# Patient Record
Sex: FEMALE | Race: WHITE | NOT HISPANIC OR LATINO | Employment: OTHER | URBAN - METROPOLITAN AREA
[De-identification: names, ages, dates, MRNs, and addresses within clinical notes are randomized per-mention and may not be internally consistent; named-entity substitution may affect disease eponyms.]

---

## 2017-01-05 ENCOUNTER — BEH TREATMENT PLAN (OUTPATIENT)
Dept: BEHAVIORAL HEALTH | Facility: CLINIC | Age: 62
End: 2017-01-05
Attending: PSYCHIATRY & NEUROLOGY

## 2017-01-05 NOTE — PROGRESS NOTES
Acknowledgement of Current Treatment Plan       I have reviewed my treatment plan with my therapist / counselor on 5.5.17. I agree with the plan as it is written in the electronic health record.    Name Signature   Rosy Jean    Name of Therapist / Counselor    Sanya Draper MA [facility]

## 2017-01-05 NOTE — PROGRESS NOTES
"Initial Individual Treatment Plan     Patient: Rosy Jean   MRN: [id number]  : 1955  Age: 61 year old  Sex: female    Diagnostic Assessment Date / Date of Initial Individual Treatment Plan: 17      Immediate Health Concerns:  No     Immediate Safety Concerns:  No    Identify the issues to be addressed in treatment:  Symptom Management, Community Resources/Discharge Planning, Abstinence/Relapse Prevention and Develop Socialization / Interpersonal Relationship Skills    Client Initial Individualized Goals for Treatment: \" Improved mental health\".    Initial Treatment suggestions for the client during the time between Diagnostic Assessment and completion of the Individualized Treatment Plan:  Follow Safety Plan   Ask for more information, support and/or assistance as needed.  Follow up with providers/community supports as needed:   Report increases or changes in symptoms to staff.  Report any personal safety concerns to staff.   Take medications as prescribed.  Report medication changes and/or side effects to staff.  Attend and participate in groups as scheduled or notify staff if unable to do so.  Report any use of substances to staff as this may impact your symptoms and/or personal safety.  Notify staff if you have any other issues that need to be addressed. This may include any current abuse / neglect / exploitation or other vulnerability.  Follow recommendations of your treatment team and discuss concerns if not in agreement.     Treatment Team Responsible: 55+ Outpatient Program (55+)     Therapeutic Interventions/Treatment Strategies may include:  Support, Redirection, Feedback, Limit/Boundaries, Safety Assessments, Structured Activity, Problem Solving, Clarification, Education, Motivational Enhancement and Relapse Prevention as needed.    Marilyn Kinsey, LICSW              "

## 2017-01-10 ENCOUNTER — HOSPITAL ENCOUNTER (OUTPATIENT)
Dept: BEHAVIORAL HEALTH | Facility: CLINIC | Age: 62
End: 2017-01-10
Attending: PSYCHIATRY & NEUROLOGY | Admitting: PSYCHIATRY & NEUROLOGY
Payer: COMMERCIAL

## 2017-01-10 PROBLEM — F31.9 BIPOLAR AFFECTIVE (H): Status: ACTIVE | Noted: 2017-01-10

## 2017-01-10 PROCEDURE — H2012 BEHAV HLTH DAY TREAT, PER HR: HCPCS

## 2017-01-10 NOTE — PROGRESS NOTES
"Group Therapy Progress Notes     Area of Treatment Focus:  Symptom Management, Community Resources/Discharge Planning and Develop Socialization / Interpersonal Relationship Skills    Therapeutic Interventions/Treatment Strategies:  Support, Redirection, Structured Activity and Education    Response to Treatment Strategies:  Accepted Feedback, Gave Feedback, Listened, Focused on Goals, Attentive, Accepted Support and Alert    Name of Group:  Mental health management    Progress Note  Group members identified stress and related body tension as result of winter weather this am, most often identifying tension in shoulders and neck.  Brief discussion around pausing to listen to body cues before practising listening to body. Breathing and stretches focused on attending to areas of tension before explore image of feeling \"uplifted\" in body and comparison to feeling \"compressed.\"  Rosy appeared guarded throughout group, although she shared she has done some work with breathing as part of yoga.          Is this a Weekly Review of the Progress on the Treatment Plan?  No     "

## 2017-01-10 NOTE — PROGRESS NOTES
"Group Therapy Progress Notes      Area of Treatment Focus:  Symptom Management, Community Resources/Discharge Planning, Abstinence/Relapse Prevention, Develop / Improve Independent Living Skills and Develop Socialization / Interpersonal Relationship Skills    Therapeutic Interventions/Treatment Strategies:  Support, Feedback, Structured Activity, Problem Solving, Clarification, Education, Motivational Enhancement Therapy and Cognitive Behavioral Therapy    Response to Treatment Strategies:  Accepted Feedback, Gave Feedback, Listened, Focused on Goals, Attentive, Alert and Demonstrates Behavior Change    Name of Group:  Psychotherapy Group      Progress Note  Hour 1: Rosy arrived. She shared some of circumstances that led to day treatment but continues to exhibit impaired insight. She made several statements that \" she felt she was being punished by her  and that he put her in the hospital\". She appears to continue to struggle with acceptance of her diagnosis and the part her illness has played in the conflicts in her marriage and with her children. She did express anger towards her . She was polite and cooperative but superficial. We did discuss as a group manic symptoms and a peer shared with her from her own experience. At this time she continues to express ambivalence about how day treatment could be helpful reports she would rather be home, organizing her home or exercising.  Hour 2: Reviewed signs and symptoms of bipolar disorder -both manic phase and depressive phase.           Is this a Weekly Review of the Progress on the Treatment Plan?  No                            "

## 2017-01-11 ENCOUNTER — HOSPITAL ENCOUNTER (OUTPATIENT)
Dept: BEHAVIORAL HEALTH | Facility: CLINIC | Age: 62
End: 2017-01-11
Attending: PSYCHIATRY & NEUROLOGY
Payer: COMMERCIAL

## 2017-01-11 ENCOUNTER — TELEPHONE (OUTPATIENT)
Dept: FAMILY MEDICINE | Facility: CLINIC | Age: 62
End: 2017-01-11

## 2017-01-11 PROCEDURE — H2012 BEHAV HLTH DAY TREAT, PER HR: HCPCS

## 2017-01-11 NOTE — PROGRESS NOTES
"Group Therapy Progress Notes     Area of Treatment Focus:  Symptom Management, Personal Safety, Community Resources/Discharge Planning, Abstinence/Relapse Prevention, Develop / Improve Independent Living Skills and Develop Socialization / Interpersonal Relationship Skills    Therapeutic Interventions/Treatment Strategies:  Support, Feedback, Safety Assessments, Problem Solving and Clarification    Response to Treatment Strategies:  Accepted Feedback, Gave Feedback, Listened, Focused on Goals, Attentive and Accepted Support    Name of Group:  Psychotherapy    Progress Note  Session 1: Rosy shared with the group that she was here only to fulfil the requirements of her stay of commitment. She disagrees with their diagnosis and disputes the behaviors that were cited as reasons for her being unsafe. She indicated that she really had no interest in \"sitting here talking about myself\". She did, however, engage with the group and provide feedback and asked appropriate questions.  Session 2: Rosy actively participated in the group discussion of being able to identify the triggers that tend to cause us to revert to old, unhelpful, behaviors            Is this a Weekly Review of the Progress on the Treatment Plan?  Yes.      Are Treatment Plan Goals being addressed?  Yes, continue treatment goals      Are Treatment Plan Strategies to Address Goals Effective?  Yes, continue treatment strategies      Are there any current contracts in place?  Yes. Attendance and safety           "

## 2017-01-11 NOTE — PROGRESS NOTES
Group Therapy Progress Notes     Area of Treatment Focus:  Symptom Management and Develop / Improve Independent Living Skills    Therapeutic Interventions/Treatment Strategies:  Support, Redirection, Feedback, Problem Solving, Clarification, Education and Motivational Enhancement Therapy    Response to Treatment Strategies:  Accepted Feedback, Gave Feedback, Listened, Focused on Goals, Attentive, Accepted Support, Alert and Demonstrates Behavior Change    Name of Group:  Mental Health Management    Progress Note  Using a handout about creating a happy brain, this client made her plan to give herself the following to increase her potential happiness: Rosy plans to read a new good book.    Is this a Weekly Review of the Progress on the Treatment Plan?  Yes.      Are Treatment Plan Goals being addressed?  Yes, continue treatment goals      Are Treatment Plan Strategies to Address Goals Effective?  Yes, continue treatment strategies      Are there any current contracts in place?  No

## 2017-01-13 ENCOUNTER — HOSPITAL ENCOUNTER (OUTPATIENT)
Dept: BEHAVIORAL HEALTH | Facility: CLINIC | Age: 62
End: 2017-01-13
Attending: PSYCHIATRY & NEUROLOGY
Payer: COMMERCIAL

## 2017-01-13 PROCEDURE — H2012 BEHAV HLTH DAY TREAT, PER HR: HCPCS

## 2017-01-13 NOTE — PROGRESS NOTES
Group Therapy Progress Notes     Area of Treatment Focus:  Symptom Management and Develop Socialization / Interpersonal Relationship Skills    Therapeutic Interventions/Treatment Strategies:  Support, Feedback, Structured Activity and Education    Response to Treatment Strategies:  Accepted Feedback, Gave Feedback, Listened and Attentive    Name of Group:  Mental health management    Progress Note  After an initial warmup with breath, group was provided with structure to explore movement preferences (efforts).  Group was encouraged to think of movement preferences as ways of approaching life, transitions and habits.  Discussion aroundA knowing self and expanding repetoire.  Group members noted that movement alone contributed to increased energy and sense of aliveness.   Rosy apologized for her difficulty staying awake and focused, although she acknowledged group was helpful.          Is this a Weekly Review of the Progress on the Treatment Plan?  No

## 2017-01-13 NOTE — PROGRESS NOTES
"Group Therapy Progress Notes     Area of Treatment Focus:  Symptom Management, Personal Safety, Community Resources/Discharge Planning, Abstinence/Relapse Prevention, Develop / Improve Independent Living Skills and Develop Socialization / Interpersonal Relationship Skills    Therapeutic Interventions/Treatment Strategies:  Support, Feedback, Safety Assessments and Problem Solving    Response to Treatment Strategies:  Accepted Feedback, Gave Feedback, Listened, Focused on Goals, Attentive and Accepted Support    Name of Group:  Psychotherapy    Progress Note  Hour 1: Rosy chapa participated in group today. She did offer some feedback and some encouragement to other group members. However, she denies that she has any reason to be here, other than \"I was told I had to\". She minimizes or denies any active symptoms of christiano. She exhibits some pressured speech and flight of ideas. She makes it very clear that she does not therapy and blames her  in particular, for her being here.   Hour 2: Client did not participated in the continuing discussion of how to go about \"checking yourself\", as method to measure the amount of change, if any, in your mood over a day or several days.          Is this a Weekly Review of the Progress on the Treatment Plan?  Yes.      Are Treatment Plan Goals being addressed?  Yes, continue treatment goals      Are Treatment Plan Strategies to Address Goals Effective?  Yes, continue treatment strategies      Are there any current contracts in place?  Yes. safety           "

## 2017-01-13 NOTE — PROGRESS NOTES
Group Therapy Progress Notes      Area of Treatment Focus:  Symptom Management, Community Resources/Discharge Planning, Abstinence/Relapse Prevention, Develop / Improve Independent Living Skills and Develop Socialization / Interpersonal Relationship Skills    Therapeutic Interventions/Treatment Strategies:  Support, Feedback, Structured Activity, Problem Solving, Clarification, Education, Motivational Enhancement Therapy and Cognitive Behavioral Therapy    Response to Treatment Strategies:  Accepted Feedback, Gave Feedback, Listened, Focused on Goals, Accepted Support, Alert and Demonstrates Behavior Change    Name of Group:  Mental health management and wellness    Progress Note  Discussed concept of re-purposing skills used in prior developmental stages ( work, caregiving, etc) to create ways of coping with current stressors. All had examples from their pasts of times they manage difficult transitions or crisis. Brainstormed how they could re-purpose skills to deal with current transition. Rosy shared how she has historically pressured herself -and then misses better problem solving opportunities.      Is this a Weekly Review of the Progress on the Treatment Plan?  Yes.      Are Treatment Plan Goals being addressed?  Yes, continue treatment goals      Are Treatment Plan Strategies to Address Goals Effective?  Yes, continue treatment strategies      Are there any current contracts in place?  Yes. safety No suicidal ideation endorsed.

## 2017-01-17 ENCOUNTER — HOSPITAL ENCOUNTER (OUTPATIENT)
Dept: BEHAVIORAL HEALTH | Facility: CLINIC | Age: 62
End: 2017-01-17
Attending: PSYCHIATRY & NEUROLOGY
Payer: COMMERCIAL

## 2017-01-17 VITALS — HEIGHT: 67 IN | WEIGHT: 209 LBS | BODY MASS INDEX: 32.8 KG/M2

## 2017-01-17 PROCEDURE — H2012 BEHAV HLTH DAY TREAT, PER HR: HCPCS

## 2017-01-17 NOTE — PROGRESS NOTES
Group Therapy Progress Notes     Area of Treatment Focus:  Symptom Management and Develop Socialization / Interpersonal Relationship Skills    Therapeutic Interventions/Treatment Strategies:  Support, Feedback, Structured Activity and Education    Response to Treatment Strategies:  Accepted Feedback, Gave Feedback, Listened, Focused on Goals, Attentive, Accepted Support and Alert    Name of Group:  Mental health management    Progress Note  Group members identified difficulty with identifying own needs.  Time was spent with guided breathing to facilitate an increased awareness of self.  Members were then encouraged to share a movement with group to expand repetoire. Brief exploration of varying emotional states and associated body postures/experience.  Discussion around importance of exploring repetoire of movement and emotion.    Rosy was engaged, although she appeared distracted at times.          Is this a Weekly Review of the Progress on the Treatment Plan?  No

## 2017-01-17 NOTE — PROGRESS NOTES
"Group Therapy Progress Notes     Area of Treatment Focus:  Symptom Management, Personal Safety, Community Resources/Discharge Planning, Abstinence/Relapse Prevention, Develop / Improve Independent Living Skills and Develop Socialization / Interpersonal Relationship Skills    Therapeutic Interventions/Treatment Strategies:  Support, Feedback, Safety Assessments, Structured Activity, Problem Solving and Education    Response to Treatment Strategies:  Accepted Feedback, Gave Feedback, Listened, Focused on Goals, Attentive and Accepted Support    Name of Group:  Psychotherapy, Coping With Depression    Progress Note  Psychotherapy: Rosy reported to the group that she did not know what she was supposed to talk about in group. It was explained that members talk about what brought them to group in the first place and how they are progressing on their goals. She stated that she didn't have anything to talk about since she was told to be here and she ended up in the hospital through a misunderstanding. She continues to minimize the dangerousness of her behavior or acknowledge that she was out of control with her christiano. She is hypervigilant about the use of language and is quite careful to avoid saying or agreeing with anything that might infer that she is mentally ill or had been out of control. She continues to blame the whole situation on her  and is angry with that.  Coping With Depression: Rosy participated in the group discussion of the handout, \"Where I was, where I am, where am I going\". The focus of the handout is to explore how to use the skills and interests we have as a catalyst to develop a goal of where we would like to be. The idea being that a goal to focus on can provide a sense of hopefulness while dealing with depression.          Is this a Weekly Review of the Progress on the Treatment Plan?  No       "

## 2017-01-18 ENCOUNTER — HOSPITAL ENCOUNTER (OUTPATIENT)
Dept: BEHAVIORAL HEALTH | Facility: CLINIC | Age: 62
End: 2017-01-18
Attending: PSYCHIATRY & NEUROLOGY
Payer: COMMERCIAL

## 2017-01-18 PROCEDURE — H2012 BEHAV HLTH DAY TREAT, PER HR: HCPCS

## 2017-01-18 NOTE — PROGRESS NOTES
"Group Therapy Progress Notes     Area of Treatment Focus:  Symptom Management and Abstinence/Relapse Prevention    Therapeutic Interventions/Treatment Strategies:  Support, Redirection, Feedback, Structured Activity, Problem Solving, Clarification, Education and Motivational Enhancement Therapy    Response to Treatment Strategies:  Accepted Feedback, Gave Feedback, Listened, Focused on Goals, Attentive, Accepted Support, Alert and Demonstrates Behavior Change    Name of Group:  Mental Health Management    Progress Note  Using provided handout on \"Cognitive Distortions\", client read in turn, highlighted those more reasonable beliefs to replace the ones that were distorted. Rosy felt that the handout had so many concepts within it, that it was hard to read. She initiated a lot of discussion and already is using some of the reasonable beliefs in her life that she was able to share.     Is this a Weekly Review of the Progress on the Treatment Plan?  Yes.      Are Treatment Plan Goals being addressed?  Yes, continue treatment goals      Are Treatment Plan Strategies to Address Goals Effective?  Yes, continue treatment strategies      Are there any current contracts in place?  No    "

## 2017-01-18 NOTE — PROGRESS NOTES
"Group Therapy Progress Notes     Area of Treatment Focus:  Symptom Management, Personal Safety, Community Resources/Discharge Planning, Abstinence/Relapse Prevention, Develop / Improve Independent Living Skills and Develop Socialization / Interpersonal Relationship Skills    Therapeutic Interventions/Treatment Strategies:  Support, Feedback, Safety Assessments, Problem Solving and Clarification    Response to Treatment Strategies:  Accepted Feedback, Gave Feedback, Listened, Focused on Goals, Attentive and Accepted Support    Name of Group:  Psychotherapy    Progress Note  Hour 1: Rosy reported that she is doing well. Simply complying with the rules of her stay of commitment. She did share that she is still hurt and angry at her family for \"putting me in the hospital over Spike\". She has no insight into her own behavior being a precipitant for this. She is pleasant and offers feedback to others, but that is the extent of her personal involvement.  Hour 2: Client participated in the group review of individual progress in psychotherapy and was able to share and elicit feedback from the other group members.           Is this a Weekly Review of the Progress on the Treatment Plan?  Yes.      Are Treatment Plan Goals being addressed?  Yes, continue treatment goals      Are Treatment Plan Strategies to Address Goals Effective?  Yes, continue treatment strategies      Are there any current contracts in place?  Yes. safety           "

## 2017-01-19 NOTE — PROGRESS NOTES
Group Therapy Progress Notes     Area of Treatment Focus:  Symptom Management    Therapeutic Interventions/Treatment Strategies:  Support, Feedback, Safety Assessments, Structured Activity and Education    Response to Treatment Strategies:  Accepted Feedback, Listened, Attentive, Accepted Support and Alert    Name of Group:  Wellness    Progress Note  Client presented with calm,even mood.Good focus and concentration during a discussion related to sources of stress and coping skills. She appeared interested in the group discussion as she wrote down some notes and asked questions.      Is this a Weekly Review of the Progress on the Treatment Plan?  No

## 2017-01-20 ENCOUNTER — HOSPITAL ENCOUNTER (OUTPATIENT)
Dept: BEHAVIORAL HEALTH | Facility: CLINIC | Age: 62
End: 2017-01-20
Attending: PSYCHIATRY & NEUROLOGY
Payer: COMMERCIAL

## 2017-01-20 PROCEDURE — H2012 BEHAV HLTH DAY TREAT, PER HR: HCPCS

## 2017-01-20 NOTE — PROGRESS NOTES
"Group Therapy Progress Notes       Area of Treatment Focus:  Symptom Management, Community Resources/Discharge Planning, Abstinence/Relapse Prevention, Develop / Improve Independent Living Skills and Develop Socialization / Interpersonal Relationship Skills    Therapeutic Interventions/Treatment Strategies:  Support, Feedback, Limit/Boundaries, Safety Assessments, Structured Activity, Problem Solving, Clarification, Education, Motivational Enhancement Therapy and Cognitive Behavioral Therapy    Response to Treatment Strategies:  Accepted Feedback, Gave Feedback, Listened, Focused on Goals, Attentive, Accepted Support, Alert and Demonstrates Behavior Change    Name of Group:  Mental health management-Coping with Depression.        Progress Note  Reviewed worksheet, \" Dysfunctional Beliefs\". Reviewed as a group common cognitive distortions. Had each member chose one and discuss a more functional way of working with the cognitive distortion.    Rosy identified need for approval as an issue for her.  She is practicing working on how she approaches her  about many of their marital issues, and is working to own her own behavior as well as stand up for herself.    Is this a Weekly Review of the Progress on the Treatment Plan?  Yes.      Are Treatment Plan Goals being addressed?  Yes, continue treatment goals      Are Treatment Plan Strategies to Address Goals Effective?  Yes, continue treatment strategies      Are there any current contracts in place?  Yes. safety - No suicidal ideation endorsed.                                                              "

## 2017-01-20 NOTE — PROGRESS NOTES
"Group Therapy Progress Notes     Area of Treatment Focus:  Symptom Management, Personal Safety, Community Resources/Discharge Planning, Abstinence/Relapse Prevention, Develop / Improve Independent Living Skills and Develop Socialization / Interpersonal Relationship Skills    Therapeutic Interventions/Treatment Strategies:  Support, Feedback, Safety Assessments, Problem Solving and Clarification    Response to Treatment Strategies:  Accepted Feedback, Gave Feedback, Listened, Focused on Goals, Attentive and Accepted Support    Name of Group:  Psychotherapy    Progress Note  Hour 1: Rosy shared with the group some of her plans for possibly moving back to [place], but she is still extremely angry with her  for \"putting me in the hospital\". She was asked if she had any responsibility for her hospitalization and she denied that she did. She indicated that she believes her 's influence as a physician convinced the [facility] team to take her to the hospital and get her put on stay. She continues to lack insight into the seriousness of her behavior and her mental illness.  Hour 2 : The group took time to review the issues that members presented in hour 1 and discussed how many, if not all, of the group members were sharing very similar issues and explored how the progress would appear on a continuum.          Is this a Weekly Review of the Progress on the Treatment Plan?  Yes.      Are Treatment Plan Goals being addressed?  Yes, continue treatment goals      Are Treatment Plan Strategies to Address Goals Effective?  Yes, continue treatment strategies      Are there any current contracts in place?  Yes. safety           "

## 2017-01-20 NOTE — PROGRESS NOTES
Group Therapy Progress Notes     Area of Treatment Focus:  Symptom Management and Develop Socialization / Interpersonal Relationship Skills    Therapeutic Interventions/Treatment Strategies:  Support, Feedback, Structured Activity and Education    Response to Treatment Strategies:  Accepted Feedback, Gave Feedback, Listened, Focused on Goals, Attentive, Accepted Support and Alert    Name of Group:  Mental health management    Progress Note  Group was provided with education comparing/contrasting authenticity and perfectionism.  Focus on authenticity as way of validating self and understanding strengths and weaknesses to better understand needs and cope effectively.  Group members identified traits associated with perfectionism such as rigidity, procrastination and decreased sense  Of self worth. Rosy wrote notes during presentation and shared that she found that information on perfectionism could help articulate her needs with .          Is this a Weekly Review of the Progress on the Treatment Plan?  No

## 2017-01-24 ENCOUNTER — HOSPITAL ENCOUNTER (OUTPATIENT)
Dept: BEHAVIORAL HEALTH | Facility: CLINIC | Age: 62
End: 2017-01-24
Attending: PSYCHIATRY & NEUROLOGY
Payer: COMMERCIAL

## 2017-01-24 PROCEDURE — H2012 BEHAV HLTH DAY TREAT, PER HR: HCPCS
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
## 2017-01-25 NOTE — PROGRESS NOTES
"Group Therapy Progress Notes     Area of Treatment Focus:  Symptom Management and Develop Socialization / Interpersonal Relationship Skills    Therapeutic Interventions/Treatment Strategies:  Support, Feedback, Structured Activity and Education    Response to Treatment Strategies:  Accepted Feedback, Gave Feedback, Listened, Focused on Goals, Attentive, Accepted Support and Alert    Name of Group:  Mental health management    Progress Note  Group began with members sharing their ideas from previous group on personal responsibility and what it means to be an \"adult\" and shared that in many cases being an adult has implied limiting playfulness.  Group members also shared how depression has impacted their sense of feeling like their \"real selves.\"  After a brief warmup group members were encouraged to share a movement with group and create a group dance to experience spontaneity.  Rosy was engaged throughout group.  She shared also that she has been experiencing an increase in shoulder pain and her feeling that she would like to follow up with PT.          Is this a Weekly Review of the Progress on the Treatment Plan?  No     "

## 2017-01-27 ENCOUNTER — HOSPITAL ENCOUNTER (OUTPATIENT)
Dept: BEHAVIORAL HEALTH | Facility: CLINIC | Age: 62
End: 2017-01-27
Attending: PSYCHIATRY & NEUROLOGY
Payer: COMMERCIAL

## 2017-01-27 PROCEDURE — H2012 BEHAV HLTH DAY TREAT, PER HR: HCPCS

## 2017-01-27 NOTE — PROGRESS NOTES
Group Therapy Progress Notes     Area of Treatment Focus:  Symptom Management and Develop Socialization / Interpersonal Relationship Skills    Therapeutic Interventions/Treatment Strategies:  Support, Feedback, Clarification and Education    Response to Treatment Strategies:  Accepted Feedback, Gave Feedback, Listened, Focused on Goals, Attentive, Accepted Support and Alert    Name of Group:  Mental health management    Progress Note  Rosy was attentive during Geraldo Madison talk presentation on shame.  She asked for quotations on shame to be repeated so that she could write them down.            Is this a Weekly Review of the Progress on the Treatment Plan?  No

## 2017-01-31 ENCOUNTER — HOSPITAL ENCOUNTER (OUTPATIENT)
Dept: BEHAVIORAL HEALTH | Facility: CLINIC | Age: 62
End: 2017-01-31
Attending: PSYCHIATRY & NEUROLOGY
Payer: COMMERCIAL

## 2017-01-31 PROCEDURE — H2012 BEHAV HLTH DAY TREAT, PER HR: HCPCS

## 2017-01-31 NOTE — PROGRESS NOTES
Group Therapy Progress Notes     Area of Treatment Focus:  Symptom Management, Personal Safety, Community Resources/Discharge Planning, Abstinence/Relapse Prevention, Develop / Improve Independent Living Skills and Develop Socialization / Interpersonal Relationship Skills    Therapeutic Interventions/Treatment Strategies:  Support, Feedback, Safety Assessments, Structured Activity, Problem Solving and Education    Response to Treatment Strategies:  Accepted Feedback, Gave Feedback, Listened, Focused on Goals, Attentive and Accepted Support    Name of Group:  Psychotherapy, Coping With Depression and Anxiety    Progress Note  Psychotherapy:  Hour 1: Rosy shared with the group that she feels she is doing well. She has made follow up appointments with a psychiatrist, as well as an individual therapists. She reported that continues to try and work out some longstanding issues with her . She shared some plans she has for building a home in [place] and wants to be ready to move by her 62th birthday. Though she emphasized that she does not intend to move at that specific time, she would just like to have that as a goal for herself. She also clarified that she is aware that her goals may sound somewhat grandiose, and having a bipolar illness, she is aware that people may take this plan in the wrong way and think she is manic again. However, she feels that she has thought it through and can present it clearly and articulately to her  and is hoping he would feel that this is a good idea as well. She seems to be more interested in groups and the discussions, though she is still a bit tangential and hyperverbal. She maintains a good sense of humor, but sometimes takes things very literally, but is fine once it is explained to her.  Hour 2: Client participated in the discussion of the tools one can use to maintain mood stability and decrease the possibility of relapse into depression.    Coping with Depression:  Client participated in the discussion of the presentation of research directed at correlating one's happiness and the ability to choose. The research points to a person's ability to generate their own happiness from within and often, offering too many choices ultimately increases anxiety.            Is this a Weekly Review of the Progress on the Treatment Plan?  No

## 2017-02-01 NOTE — PROGRESS NOTES
"Group Therapy Progress Notes     Area of Treatment Focus:  Symptom Management and Develop Socialization / Interpersonal Relationship Skills    Therapeutic Interventions/Treatment Strategies:  Support, Feedback, Structured Activity and Clarification    Response to Treatment Strategies:  Accepted Feedback, Gave Feedback, Listened, Focused on Goals, Attentive, Accepted Support and Alert    Name of Group:  Mental health management    Progress Note  Group began with follow discussion on shame and vulnerability. This developed into exploration into how shame is experienced in the body.  Group members noted that it was more difficult to breathe fully when in shame posture.  This was contrasted with experience of moving with intention of \"putting oneself out in the world.\"  Rosy was engaged and asked relevant questions.          Is this a Weekly Review of the Progress on the Treatment Plan?  No     "

## 2017-02-03 ENCOUNTER — HOSPITAL ENCOUNTER (OUTPATIENT)
Dept: BEHAVIORAL HEALTH | Facility: CLINIC | Age: 62
End: 2017-02-03
Attending: PSYCHIATRY & NEUROLOGY
Payer: COMMERCIAL

## 2017-02-03 PROCEDURE — H2012 BEHAV HLTH DAY TREAT, PER HR: HCPCS

## 2017-02-03 NOTE — PROGRESS NOTES
Group Therapy Progress Notes     Area of Treatment Focus:  Symptom Management, Community Resources/Discharge Planning, Abstinence/Relapse Prevention, Develop / Improve Independent Living Skills and Develop Socialization / Interpersonal Relationship Skills    Therapeutic Interventions/Treatment Strategies:  Support, Feedback, Structured Activity, Problem Solving, Clarification, Education, Motivational Enhancement Therapy and Cognitive Behavioral Therapy    Response to Treatment Strategies:  Accepted Feedback, Gave Feedback, Listened, Focused on Goals, Attentive, Accepted Support, Alert and Demonstrates Behavior Change    Name of Group:  Psychotherapy Group      Progress Note  Hour 1: Rosy shared that she is working to repair her relationships with her , son, and daughter. She wrote her son a short note to acknowledge that she appreciated that he told her he loved her this week. The relationship with her daughter is more strained. She plans to write her a letter, keep it simple, and share how much the relationship means to her and how she is working on her recovery. She shared that the relationship with her  is more complicated. She received supportive advise from peers suggesting ways she could start to build more intimacy and trust- such as going out for a coffee date in a neutral location. Overall Rosy appears far less symptomatic than upon discharge from the hospital. She is beginning to have insight, able to problem solve, is setting limits on her time and engaging in self care.    Hour 2: Discussion on the recovery skill of- practicing  difficult tasks - such as making phone calls, dealing with bills etc,, that these tasks can be consiered important skill building exercises- as it is like working muscles that have not been used for some time. It helps engage the brain and develops neuroplasticity. Rosy has been writing letters to her children, and setting better limits for herself.           Is  this a Weekly Review of the Progress on the Treatment Plan?  Yes.      Are Treatment Plan Goals being addressed?  Yes, continue treatment goals      Are Treatment Plan Strategies to Address Goals Effective?  Yes, continue treatment strategies      Are there any current contracts in place?  Yes. safety. No suicidal ideation endorsed.

## 2017-02-03 NOTE — PROGRESS NOTES
Group Therapy Progress Notes     Area of Treatment Focus:   Develop Socialization / Interpersonal Relationship Skills    Therapeutic Interventions/Treatment Strategies:  Support, Redirection, Feedback, Safety Assessments, Structured Activity, Clarification, Education and Motivational Enhancement Therapy    Response to Treatment Strategies:  Accepted Feedback, Gave Feedback, Listened, Focused on Goals, Attentive, Accepted Support, Alert and Demonstrates Behavior Change    Name of Group: Mental Health Management    Progress Note  MHM: Using a handout on feeling and need identification and highlighters, read through and identified patterns and translations that would be more effective in asserting.    Is this a Weekly Review of the Progress on the Treatment Plan?  Yes.      Are Treatment Plan Goals being addressed?  Yes, continue treatment goals      Are Treatment Plan Strategies to Address Goals Effective?  Yes, continue treatment strategies      Are there any current contracts in place?  No

## 2017-02-07 ENCOUNTER — HOSPITAL ENCOUNTER (OUTPATIENT)
Dept: BEHAVIORAL HEALTH | Facility: CLINIC | Age: 62
End: 2017-02-07
Attending: PSYCHIATRY & NEUROLOGY
Payer: COMMERCIAL

## 2017-02-07 PROCEDURE — H2012 BEHAV HLTH DAY TREAT, PER HR: HCPCS

## 2017-02-07 NOTE — PROGRESS NOTES
"  Adult Mental Health Outpatient Group Therapy Progress Note     Client Initial Individualized Goals for Treatment: \"Improved mental health\".    See Initial Treatment suggestions for the client during the time between Diagnostic Assessment and completion of the Master Individualized Treatment Plan:    Treatment Goals:     see above.     Area of Treatment Focus:  Symptom Management, Develop / Improve Independent Living Skills and Develop Socialization / Interpersonal Relationship Skills    Therapeutic Interventions/Treatment Strategies:  Support, Feedback, Safety Assessments, Structured Activity and Problem Solving    Response to Treatment Strategies:  Accepted Feedback, Gave Feedback, Listened, Focused on Goals, Attentive and Accepted Support    Name of Group:  Wellness     Description and Outcome:  Pt attended and participated in a structured life skills group session where intervention focused on sensory system education, strategies to modulate arousal level, and coping through the senses for improved functioning in daily routines and other meaningful tasks. Client presented to group with a bright affect. Engaged in structured group session focused on introducing client to the concept of self regulation and sensory based coping skills. She shared several personal examples of how she personally has used and observed sensory based coping skills to promote regulated mood. Client addressed ITP goal number initial goal in this session.    Ines Gregorio OTR/L    Is this a Weekly Review of the Progress on the Treatment Plan?  Yes.      Are Treatment Plan Goals being addressed?  Yes, continue treatment goals      Are Treatment Plan Strategies to Address Goals Effective?  Yes, continue treatment strategies      Are there any current contracts in place?  No              "
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
## 2017-02-08 NOTE — PROGRESS NOTES
"Group Therapy Progress Notes     Area of Treatment Focus:  Symptom Management and Develop Socialization / Interpersonal Relationship Skills    Therapeutic Interventions/Treatment Strategies:  Support, Feedback, Structured Activity and Education    Response to Treatment Strategies:  Accepted Feedback, Gave Feedback, Listened, Focused on Goals, Attentive, Accepted Support and Alert    Name of Group:  Mental health management    Progress Note  After initial warmup, group continued with exploration of their previously  Identified \"power move,\" continuing with a discussion and exploration of using the momentum of power move to continue movement into an exploration of surroundings with calmness and control.  Rosy was engaged although more concrete in her feedback and contributions to group theme.          Is this a Weekly Review of the Progress on the Treatment Plan?  No     "

## 2017-02-10 ENCOUNTER — HOSPITAL ENCOUNTER (OUTPATIENT)
Dept: BEHAVIORAL HEALTH | Facility: CLINIC | Age: 62
End: 2017-02-10
Attending: PSYCHIATRY & NEUROLOGY
Payer: COMMERCIAL

## 2017-02-10 PROCEDURE — H2012 BEHAV HLTH DAY TREAT, PER HR: HCPCS

## 2017-02-10 NOTE — PROGRESS NOTES
"Group Therapy Progress Notes     Area of Treatment Focus:  Symptom Management and Develop Socialization / Interpersonal Relationship Skills    Therapeutic Interventions/Treatment Strategies:  Support, Feedback, Clarification and Education    Response to Treatment Strategies:  Accepted Feedback, Gave Feedback, Listened, Focused on Goals, Attentive, Accepted Support and Alert    Name of Group:  Mental health management    Progress Note  Group was provided with information on \"radical acceptance.\" Discussion developed around definition of term as well as clarification of the need to accept and knowing what to accept.  Rosy shared example of needing to accept homero's acceptance and treatment of her.  She acknowledged that acceptance relationship as it is may allow her to decrease \"suffering \" around relationship.  Rosy shared that she found this topic relevant and helpful.          Is this a Weekly Review of the Progress on the Treatment Plan?  No     "

## 2017-02-10 NOTE — PROGRESS NOTES
Group Therapy Progress Notes     Area of Treatment Focus:  Symptom Management, Personal Safety, Community Resources/Discharge Planning, Abstinence/Relapse Prevention, Develop / Improve Independent Living Skills and Develop Socialization / Interpersonal Relationship Skills    Therapeutic Interventions/Treatment Strategies:  Support, Feedback, Safety Assessments, Structured Activity, Problem Solving and Education    Response to Treatment Strategies:  Accepted Feedback, Gave Feedback, Listened, Focused on Goals, Attentive and Accepted Support    Name of Group:  Psychotherapy, Coping with Depression    Progress Note  Psychotherapy Hour 1: Rosy shared with the group that she was pleasantly surprised by her daughter coming over to her home. This daughter has not had contact with her since her last hospitalization in 2008. This marks a significant change in her, from the perspective of her family and she is grateful for this. She still questions the validity of her bipolar diagnosis despite four previous psychiatric hospitalizations.  Hour 2: Client participated in the discussion inspired by hour 1, in which the group explored the importance of trust within the group and how it helps to be able to freely share what's going on without being judged.  Coping with Depression: Client participated in the group discussion of the importance of recognizing the symptoms of bipolar disorder in order to address it before the behavior becomes too destructive.            Is this a Weekly Review of the Progress on the Treatment Plan?  Yes.      Are Treatment Plan Goals being addressed?  Yes, continue treatment goals      Are Treatment Plan Strategies to Address Goals Effective?  Yes, continue treatment strategies      Are there any current contracts in place?  Yes. safety

## 2017-02-14 ENCOUNTER — HOSPITAL ENCOUNTER (OUTPATIENT)
Dept: BEHAVIORAL HEALTH | Facility: CLINIC | Age: 62
End: 2017-02-14
Attending: PSYCHIATRY & NEUROLOGY
Payer: COMMERCIAL

## 2017-02-14 PROCEDURE — H2012 BEHAV HLTH DAY TREAT, PER HR: HCPCS

## 2017-02-14 NOTE — PROGRESS NOTES
Group Therapy Progress Notes     Area of Treatment Focus:  Symptom Management, Personal Safety, Community Resources/Discharge Planning, Abstinence/Relapse Prevention, Develop / Improve Independent Living Skills and Develop Socialization / Interpersonal Relationship Skills    Therapeutic Interventions/Treatment Strategies:  Support, Feedback, Safety Assessments, Problem Solving and Education    Response to Treatment Strategies:  Accepted Feedback, Gave Feedback, Listened, Focused on Goals, Attentive and Accepted Support    Name of Group:  Psychotherapy    Progress Note  Hour 1: Rosy shared with the group that she is really starting to understand the overall repercussions of her illness and how it has impacted her whole family. There is still a part of her that wants to blame her  for everything. The group very respectfully nudged her to take a look at what her part might be in the overall situation at home.   Hour 2: Rosy participated in the group discussion of the importance of recognizing symptoms of relapse, before things get too far and it results in hospitalization.          Is this a Weekly Review of the Progress on the Treatment Plan?  No

## 2017-02-14 NOTE — PROGRESS NOTES
"  Adult Mental Health Outpatient Group Therapy Progress Note     Client Initial Individualized Goals for Treatment: \"Improved mental health\".     See Initial Treatment suggestions for the client during the time between Diagnostic Assessment and completion of the Master Individualized Treatment Plan:     Treatment Goals:     see above.       Area of Treatment Focus:  Symptom Management and Develop Socialization / Interpersonal Relationship Skills    Therapeutic Interventions/Treatment Strategies:  Support, Feedback, Structured Activity and Education    Response to Treatment Strategies:  Accepted Feedback, Gave Feedback, Listened, Focused on Goals, Attentive, Accepted Support and Alert    Name of Group:  Mental health management     Description and Outcome:  Group began with discussion on change and physiological responses to change. Discussion on importance of experiencing small changes so that there is better preparation for larger changes.  Group members were asked to share movements with group as a way of challenging self.  Rosy was engaged and voiced understanding of material and experiential.    Is this a Weekly Review of the Progress on the Treatment Plan?  No        "

## 2017-02-17 ENCOUNTER — HOSPITAL ENCOUNTER (OUTPATIENT)
Dept: BEHAVIORAL HEALTH | Facility: CLINIC | Age: 62
End: 2017-02-17
Attending: PSYCHIATRY & NEUROLOGY
Payer: COMMERCIAL

## 2017-02-17 PROCEDURE — H2012 BEHAV HLTH DAY TREAT, PER HR: HCPCS

## 2017-02-17 NOTE — PROGRESS NOTES
Individualized Treatment Plan     Date of Plan: 2017    Name: Rosy Jean MRN: [id number]    : 1955    Programs:  55+ Outpatient Program (55+)    Clinical Track (if applicable):  C1    DSM5 Diagnosis  Bipolar I Disorder, most recent episode, manic, severe  Cyclothymia by history    Team Members Contributing to Plan:  Sanya Draper, Zandra Kinsey, Vianney Flores, Eva Medina and Radha Watkins    Client Strengths:  caring, creative, educated, empathetic, goal-focused, good listener, has a previous history of therapy, insightful, intelligent, motivated, open to learning, open to suggestions / feedback, responsible parent, supportive, wants to learn, willing to ask questions and willing to relate to others    Client Participation in Plan:  Contributed to goals and plan   Agrees with plan     Areas of Vulnerability:  Manic symptoms   Poor impulse control   Psychotic symptoms/behavior   Anxiety  Depressive symptoms     Long-Term Goals:  Knowledge about illness and management of symptoms   Maintenance of personal safety   Effective management of impulsivity     Abuse Prevention Plan:  Safe, therapeutic environment   Safety coping plan as needed   Education regarding illness and skill development   Coordination with care providers     Discharge Criteria:  Satisfactory progress toward treatment goals   Improvement re: identified problems and symptoms   Ability to continue recovery at next level of service   Has a discharge plan in place   Maintaining compliance with Stayed Order for Commitment     Areas of Treatment Focus            Area of Treatment Focus:   Personal Safety  Start Date:    2017    Goal:  Target Date: 3/7/2017, ,  Status: Active  Client will notify staff when needing assistance to develop or implement a coping plan to manage suicidal or self injurious urges.  Client will use coping plan for safety, as needed.      Progress: 3/7: Rosy has expressed no suicidal ideation nor  reported any SIB. 4/7: Rosy currently reports no SI or SIB.           Treatment Strategies:   Assist to identify treatment goals  Assess / reassess for appropriate therapy program involvement, encourage participation in therapies  Assess / reassess level of potential for harm to self or others  Engage in safety planning when indicated  Use reality based supportive approach      Area of Treatment Focus:   Symptom Stabilization and Management  Start Date:    2/7/2017    Goal:  Target Date: 3/7, 4/7, 5/5 Status: Active  Will report on symptoms and identify skills to use to manage christiano and impulsive behavior and Will utilize structured tasks to assess and improve daily time management      Progress: 3/7: Rosy has seemed to make great strides in her recovery. She initially was reluctant to take responsibility for her behavior, blaming primarily her . After a while she was able to recognize and own her role to some degree. 4/7: Rosy continues to report that things are going well. She has been enjoy visits with her kids, though it can be demanding at times. There continues to be some stress in her relationship with her , however their intention is to pursue couples counseling at some point.            Treatment Strategies:   Assist to identify treatment goals  Assess / reassess for appropriate therapy program involvement, encourage participation in therapies  Facilitate increased self awareness  Use reality based supportive approach      Area of Treatment Focus:   Community Resources / Support and Discharge Planning  Start Date:    2/7/2017   :  Target Date: 3/7, 4/7 Status: Active  Rosy will identify and connect with community resources and supports to best utilize her time and decrease isolation. Will remain in compliance with any legal requirements.      Progress: 3/7: Rosy has been expressing an interest in joining a [facility] group close to her home. She has been meeting with her  as required  and has been following the conditions of her stay of commitment. 4/7: Rosy has followed through with attending [facility], and has been seeing her therapist, psychiatrist and  as required.           Treatment Strategies:   Assist to identify treatment goals  Assist with discharge planning  Assess / reassess for appropriate therapy program involvement, encourage participation in therapies  Facilitate increased self awareness  Teach adaptive coping skills and communication skills  Use reality based supportive approach

## 2017-02-17 NOTE — PROGRESS NOTES
Group Therapy Progress Notes     Area of Treatment Focus:  Symptom Management, Personal Safety, Develop / Improve Independent Living Skills and Develop Socialization / Interpersonal Relationship Skills    Therapeutic Interventions/Treatment Strategies:  Support, Feedback, Safety Assessments, Structured Activity, Problem Solving and Education    Response to Treatment Strategies:  Accepted Feedback, Gave Feedback, Listened, Focused on Goals, Attentive, Accepted Support and Alert    Name of Group:   Coping with Depression    Progress Note  Client actively participated in structured activity.  She completed the worksheet on tension/anxiety and read her responses to the group.  She provided positive feedback to peers in group and accepted feedback from them.      Is this a Weekly Review of the Progress on the Treatment Plan?  No

## 2017-02-17 NOTE — PROGRESS NOTES
Group Therapy Progress Notes     Area of Treatment Focus:  Symptom Management, Personal Safety, Community Resources/Discharge Planning, Abstinence/Relapse Prevention, Develop / Improve Independent Living Skills and Develop Socialization / Interpersonal Relationship Skills    Therapeutic Interventions/Treatment Strategies:  Support, Feedback, Safety Assessments, Problem Solving and Clarification    Response to Treatment Strategies:  Accepted Feedback, Gave Feedback, Listened, Focused on Goals, Attentive and Accepted Support    Name of Group:  Psychotherapy    Progress Note  Hour 1: Rosy shared with the group that she feels things are going well. She was surprised that now she actually looks forward top coming to group. She is aware that it is important for her to have structure in day and also to be engage with people. She is setting up dates with different friends to stay active on days she does not come to this program. She feels that she still has some things to work out with her  and to voice her displeasure at how he has been treating her. She is quite pleased at the progress she is making toward reconnecting with her children.  Hour 2: Client participation in discussion regarding how each individual experiences progress and often, it can underestimate what is actually observed by others.          Is this a Weekly Review of the Progress on the Treatment Plan?  Yes.      Are Treatment Plan Goals being addressed?  Yes, continue treatment goals      Are Treatment Plan Strategies to Address Goals Effective?  Yes, continue treatment strategies      Are there any current contracts in place?  Yes. safety

## 2017-02-17 NOTE — PROGRESS NOTES
"  Adult Mental Health Outpatient Group Therapy Progress Note     Client Initial Individualized Goals for Treatment: \"Improved mental health\".     See Initial Treatment suggestions for the client during the time between Diagnostic Assessment and completion of the Master Individualized Treatment Plan:     Treatment Goals:     see above.       Area of Treatment Focus:  Symptom Management and Develop Socialization / Interpersonal Relationship Skills    Therapeutic Interventions/Treatment Strategies:  Support, Feedback, Structured Activity and Education    Response to Treatment Strategies:  Accepted Feedback, Gave Feedback, Listened, Focused on Goals, Attentive, Accepted Support and Alert    Name of Group:  Mental health management     Description and Outcome:  Material presented on radical acceptance as part of distress tolerance., including clarification of topic as well as instruction for identifying areas in which radical acceptance is needed and skills for practising. Rosy offered example of need for medication as something she is working at accepting.    Is this a Weekly Review of the Progress on the Treatment Plan?  No        "

## 2017-02-21 ENCOUNTER — HOSPITAL ENCOUNTER (OUTPATIENT)
Dept: BEHAVIORAL HEALTH | Facility: CLINIC | Age: 62
End: 2017-02-21
Attending: PSYCHIATRY & NEUROLOGY
Payer: COMMERCIAL

## 2017-02-21 PROCEDURE — H2012 BEHAV HLTH DAY TREAT, PER HR: HCPCS

## 2017-02-21 NOTE — PROGRESS NOTES
Group Therapy Progress Notes     Area of Treatment Focus:  Symptom Management, Personal Safety, Community Resources/Discharge Planning, Abstinence/Relapse Prevention, Develop / Improve Independent Living Skills and Develop Socialization / Interpersonal Relationship Skills    Therapeutic Interventions/Treatment Strategies:  Support, Feedback, Safety Assessments, Structured Activity, Problem Solving, Clarification and Education    Response to Treatment Strategies:  Accepted Feedback, Gave Feedback, Listened, Focused on Goals, Attentive and Accepted Support    Name of Group:  Psychotherapy, Coping With Depression/Anxiety    Progress Note  Psychotherapy: Hour 1: Rosy shared with the group that she has been staying pretty busy. She got together with her in-laws and this was the first time with them since her hospitalization. She was anxious and didn't want to go, but followed through and things went well for her. She is now focusing on mending relationships with her kids.  Hour 2: The group took time to go around and articulate just exactly what they were working and how it fit with their treatment plan. Also, it could give the other group members a clearer picture of how to direct their feedback.    Coping with Depression and Anxiety: The group explored the issues specific to aging and the developmental tasks of that age group, and what might be sources of anxiety that arise during this time.          Is this a Weekly Review of the Progress on the Treatment Plan?  No

## 2017-02-22 NOTE — PROGRESS NOTES
"Group Therapy Progress Notes     Area of Treatment Focus:  Symptom Management and Develop Socialization / Interpersonal Relationship Skills    Therapeutic Interventions/Treatment Strategies:  Support, Feedback, Structured Activity and Education    Response to Treatment Strategies:  Accepted Feedback, Gave Feedback, Listened, Focused on Goals, Attentive, Accepted Support and Alert    Name of Group:  Mental health management    Progress Note  Group focus today on identification of needs and sharing those needs with others.  Group began by using breathe to listen to self and determine which parts of self needing tending to.  Group members then shared what they learned and practised saying \"my body needs...\".  Rosy was engaged and had difficulty initially sharing what her body needed (leg stretch) with group without apologizing.          Is this a Weekly Review of the Progress on the Treatment Plan?  No     "

## 2017-02-24 ENCOUNTER — HOSPITAL ENCOUNTER (OUTPATIENT)
Dept: BEHAVIORAL HEALTH | Facility: CLINIC | Age: 62
End: 2017-02-24
Attending: PSYCHIATRY & NEUROLOGY
Payer: COMMERCIAL

## 2017-02-24 PROCEDURE — H2012 BEHAV HLTH DAY TREAT, PER HR: HCPCS

## 2017-02-24 NOTE — PROGRESS NOTES
"Group Therapy Progress Notes     Area of Treatment Focus:  Symptom Management, Personal Safety, Community Resources/Discharge Planning, Abstinence/Relapse Prevention, Develop / Improve Independent Living Skills and Develop Socialization / Interpersonal Relationship Skills    Therapeutic Interventions/Treatment Strategies:  Support, Feedback, Safety Assessments, Problem Solving and Education    Response to Treatment Strategies:  Accepted Feedback, Gave Feedback, Listened, Focused on Goals, Attentive and Accepted Support    Name of Group:  Psychotherapy    Progress Note  Hour 1: Rosy shared with the group that things are going fairly well. She has been staying busy and spending more time with her . She has become aware that if she does not have things scheduled for herself, she will stay in bed and has little motivation. Subsequently, she is trying to make some kind of plans, even if it's just to have coffee with a neighbor, to get up and get moving.  Hour 2: The group took time to say good bye to a member who was completing the program and discussed how a person knows when they are ready to \"graduate\" from the program.          Is this a Weekly Review of the Progress on the Treatment Plan?  Yes.      Are Treatment Plan Goals being addressed?  Yes, continue treatment goals      Are Treatment Plan Strategies to Address Goals Effective?  Yes, continue treatment strategies      Are there any current contracts in place?  Yes. safety         "

## 2017-02-24 NOTE — PROGRESS NOTES
"Group Therapy Progress Notes     Area of Treatment Focus:  Symptom Management    Therapeutic Interventions/Treatment Strategies:  Structured Activity, Clarification and Education    Response to Treatment Strategies:  Accepted Feedback, Gave Feedback, Listened, Focused on Goals and Attentive    Name of Group:  Mental Health Management    Progress Note  Topic: Exercise related to being a Constant Observer of one's own thoughts, feelings, experience and noting that the same \"you\" has been there throughout all of life, unchanged and safe. Also started an exercise to work on identifying the masks we wear, what we allow others to see, and what we keep hidden inside. Rosy fully participated in these exercises and showed insight.         Is this a Weekly Review of the Progress on the Treatment Plan?  No       "

## 2017-02-24 NOTE — PROGRESS NOTES
Group Therapy Progress Notes     Area of Treatment Focus:  Symptom Management, Abstinence/Relapse Prevention and Develop Socialization / Interpersonal Relationship Skills    Therapeutic Interventions/Treatment Strategies:  Support, Feedback, Clarification, Education and Motivational Enhancement Therapy    Response to Treatment Strategies:  Accepted Feedback, Gave Feedback, Listened, Focused on Goals, Attentive and Accepted Support    Name of Group:  Wellness and Mental Health    Progress Note  Facilitated a Story Telling Group. Each member catina a card with a question eliciting a story about their past. Exercise in recall, communication, sharing, and trust building- all participated.          Is this a Weekly Review of the Progress on the Treatment Plan?  Yes.      Are Treatment Plan Goals being addressed?  Yes, continue treatment goals      Are Treatment Plan Strategies to Address Goals Effective?  Yes, continue treatment strategies      Are there any current contracts in place?  Yes. safety no SI endorsed.

## 2017-02-28 ENCOUNTER — HOSPITAL ENCOUNTER (OUTPATIENT)
Dept: BEHAVIORAL HEALTH | Facility: CLINIC | Age: 62
End: 2017-02-28
Attending: PSYCHIATRY & NEUROLOGY
Payer: COMMERCIAL

## 2017-02-28 PROCEDURE — H2012 BEHAV HLTH DAY TREAT, PER HR: HCPCS

## 2017-02-28 NOTE — PROGRESS NOTES
"Group Therapy Progress Notes     Area of Treatment Focus:  Symptom Management and Develop Socialization / Interpersonal Relationship Skills    Therapeutic Interventions/Treatment Strategies:  Support, Feedback, Structured Activity and Education    Response to Treatment Strategies:  Accepted Feedback, Gave Feedback, Listened, Focused on Goals, Attentive, Accepted Support and Alert    Name of Group:  Mental health management    Progress Note  Group focus continues on identifying needs by actively and intentionally practising listening to body.  Group members were encouraged to discover something new about themselves while breathing and participating in body scan.  Group ended by moving the \"power move\" that had been developed several weeks ago reminding clients about their internal strength and ability to move forward.  Rosy was engaged and shared that she discovered how important stretching was to her.  She shared that she felt uncomfortable standing to stretch but was able to modify to have her needs met.          Is this a Weekly Review of the Progress on the Treatment Plan?  No     "

## 2017-02-28 NOTE — PROGRESS NOTES
"Group Therapy Progress Notes     Area of Treatment Focus:  Symptom Management, Personal Safety, Community Resources/Discharge Planning, Abstinence/Relapse Prevention, Develop / Improve Independent Living Skills and Develop Socialization / Interpersonal Relationship Skills    Therapeutic Interventions/Treatment Strategies:  Support, Feedback, Safety Assessments, Structured Activity, Problem Solving and Education    Response to Treatment Strategies:  Accepted Feedback, Gave Feedback, Listened, Focused on Goals, Attentive and Accepted Support    Name of Group:  Psychotherapy, Coping with Depression & Anxiety    Progress Note  Psychotherapy   Hour 1: Rosy shared with the group that she is doing \"ok\". She reports that her mood has been stable and she is continuing to work things out with her . She indicated that she has an appointment with a psychiatrist, therapist and her  all within the next week. She wants to make sure she is in compliance with her stay of commitment.   Hour 2: With a new addition to the group and others leaving, the group discussed the importance of trust within the group and how it takes some time to be at a point where one is comfortable with being in a group.    Coping with Depression & Anxiety: Client actively participated in the discussion of the handout, \"The Concept of Worry\" and the differences and similarities of worry and anxiety.          Is this a Weekly Review of the Progress on the Treatment Plan?  No     "

## 2017-03-03 ENCOUNTER — HOSPITAL ENCOUNTER (OUTPATIENT)
Dept: BEHAVIORAL HEALTH | Facility: CLINIC | Age: 62
End: 2017-03-03
Attending: PSYCHIATRY & NEUROLOGY
Payer: COMMERCIAL

## 2017-03-03 PROCEDURE — H2012 BEHAV HLTH DAY TREAT, PER HR: HCPCS

## 2017-03-03 NOTE — PROGRESS NOTES
Group Therapy Progress Notes     Area of Treatment Focus:  Symptom Management, Community Resources/Discharge Planning, Abstinence/Relapse Prevention, Develop / Improve Independent Living Skills and Develop Socialization / Interpersonal Relationship Skills    Therapeutic Interventions/Treatment Strategies:  Support, Feedback, Structured Activity, Problem Solving, Clarification, Education, Motivational Enhancement Therapy and Cognitive Behavioral Therapy    Response to Treatment Strategies:  Accepted Feedback, Gave Feedback, Listened, Focused on Goals, Attentive, Alert and Demonstrates Behavior Change    Name of Group:  Coping with Depression    Progress Note  Facilitated storytelling group in which each member shared a special event including as many senses and elements as Possible. Peer had to recall elements- neuroplasticity, brain exercise. Rosy's affect appeared bright, did not endorse depression not observation of christiano. She was able to recall a story and listen effectively to peers, observed smiling, making connections with peers.           Is this a Weekly Review of the Progress on the Treatment Plan?  Yes.      Are Treatment Plan Goals being addressed?  Yes, continue treatment goals      Are Treatment Plan Strategies to Address Goals Effective?  Yes, continue treatment strategies      Are there any current contracts in place?  Yes. No SI endorsed.

## 2017-03-03 NOTE — PROGRESS NOTES
"Group Therapy Progress Notes     Area of Treatment Focus:  Symptom Management, Personal Safety, Community Resources/Discharge Planning, Abstinence/Relapse Prevention, Develop / Improve Independent Living Skills and Develop Socialization / Interpersonal Relationship Skills    Therapeutic Interventions/Treatment Strategies:  Support, Feedback, Safety Assessments, Problem Solving and Clarification    Response to Treatment Strategies:  Accepted Feedback, Gave Feedback, Listened, Focused on Goals, Attentive and Accepted Support    Name of Group:  Psychotherapy    Progress Note  Hour 1: Rosy shared with the group that she had met with her psychiatrist and . She reported that the meetings went well and she thinks it will be good working with this particular doctor. She shared that she feels like she is singled out as the \"problem\" for everything that is wrong in their relationship because she has bipolar disorder. She agrees that when she is symptomatic, things are not good, but when she is doing well, it's not fair to keep making it about her. She is going to have a meeting with her  and relate this to him. He has stated that he wants them to meet twice a week for a \"meeting\" to discuss their relationship. He believes that all of their problems are a\"medical issue\", not a relationship issue.  Hour 2: The group discussed the concept of self-talk and how the things we tell ourselves might contribute to the recurrence and intensity of anxiety. A symptom that was shared by many in hour 1.          Is this a Weekly Review of the Progress on the Treatment Plan?  Yes.      Are Treatment Plan Goals being addressed?  Yes, continue treatment goals      Are Treatment Plan Strategies to Address Goals Effective?  Yes, continue treatment strategies      Are there any current contracts in place?  Yes. safety         "

## 2017-03-07 ENCOUNTER — HOSPITAL ENCOUNTER (OUTPATIENT)
Dept: BEHAVIORAL HEALTH | Facility: CLINIC | Age: 62
End: 2017-03-07
Attending: PSYCHIATRY & NEUROLOGY
Payer: COMMERCIAL

## 2017-03-07 PROCEDURE — H2012 BEHAV HLTH DAY TREAT, PER HR: HCPCS

## 2017-03-07 NOTE — PROGRESS NOTES
"Group Therapy Progress Notes     Area of Treatment Focus:  Symptom Management, Personal Safety, Community Resources/Discharge Planning, Abstinence/Relapse Prevention, Develop / Improve Independent Living Skills and Develop Socialization / Interpersonal Relationship Skills    Therapeutic Interventions/Treatment Strategies:  Support, Feedback, Safety Assessments, Structured Activity, Problem Solving and Education    Response to Treatment Strategies:  Accepted Feedback, Gave Feedback, Listened, Focused on Goals, Attentive and Accepted Support    Name of Group:  Psychotherapy, Coping with Depression & Anxiety    Progress Note  Psychotherapy: Hour 1: Rosy shared with the group that things continue to go fairly well for her. She is busy planning things for kids coming home over spring break and also making plans for a vacation with her  and family. They both seem to have their own separate social lives, which disappoints her, but says, \"oh well, we get by fine\". She was pleased that she was cleared by her  to be able to leave the state for vacation.  Hour 2: Client participated in the group discussion of the relapse and how to distinguish the ups and downs of ordinary life from the beginnings of a relapse and how the group deals with that.    Coping with Depression & Anxiety: The group watched the continuation of the video on Radical Acceptance, by Augusta Humphries. Rosy participated in the discussion afterward and could identify areas in which these concepts could be helpfully applied.          Is this a Weekly Review of the Progress on the Treatment Plan?  No        "

## 2017-03-08 NOTE — PROGRESS NOTES
"Group Therapy Progress Notes     Area of Treatment Focus:  Symptom Management and Develop Socialization / Interpersonal Relationship Skills    Therapeutic Interventions/Treatment Strategies:  Support, Feedback, Structured Activity and Education    Response to Treatment Strategies:  Accepted Feedback, Gave Feedback, Listened, Focused on Goals, Attentive, Accepted Support and Alert    Name of Group:  Mental health management    Progress Note  Group began with brief followup discussion on radical acceptance, leading into introduction to concept of opposite emotion, particularly as it relates to fear and anxiety.  Group members were provided with a guided breathing exercise to focus on self awareness and self acceptance.  Reviewed \"power stance\" and added movement to \"shake it off\" leading to an \"acceptance\" stance.  Rosy was actively engaged and shared that she found exploration of acceptance as useful.          Is this a Weekly Review of the Progress on the Treatment Plan?  No     "

## 2017-03-10 ENCOUNTER — HOSPITAL ENCOUNTER (OUTPATIENT)
Dept: BEHAVIORAL HEALTH | Facility: CLINIC | Age: 62
End: 2017-03-10
Attending: PSYCHIATRY & NEUROLOGY
Payer: COMMERCIAL

## 2017-03-10 PROCEDURE — H2012 BEHAV HLTH DAY TREAT, PER HR: HCPCS

## 2017-03-10 NOTE — PROGRESS NOTES
"  Adult Mental Health Outpatient Group Therapy Progress Note     Client Initial Individualized Goals for Treatment: \"Improved mental health\".     See Initial Treatment suggestions for the client during the time between Diagnostic Assessment and completion of the Master Individualized Treatment Plan:     Treatment Goals:     see above.       Area of Treatment Focus:  Symptom Management and Develop Socialization / Interpersonal Relationship Skills    Therapeutic Interventions/Treatment Strategies:  Support, Feedback, Structured Activity, Clarification and Education    Response to Treatment Strategies:  Accepted Feedback, Gave Feedback, Listened, Focused on Goals, Attentive, Accepted Support and Alert    Name of Group:  Mental health management     Description and Outcome:  Group continued to explore radical acceptance skills. Focus today on identifying individual things that need to be accepted.  Reviewed worksheet which records different skills practised and effectiveness of each.  Rosy identified need to accept impact of mental health \"episode\" on her children.  She asked relevant questions, though continues to have difficulty applying concept.    Is this a Weekly Review of the Progress on the Treatment Plan?  No        "

## 2017-03-10 NOTE — PROGRESS NOTES
"Group Therapy Progress Notes      Area of Treatment Focus:  Symptom Management, Abstinence/Relapse Prevention and Develop Socialization / Interpersonal Relationship Skills     Therapeutic Interventions/Treatment Strategies:  Support, Structured Activity, Problem Solving, Clarification and Education     Response to Treatment Strategies:  Accepted Feedback, Gave Feedback, Listened, Focused on Goals, Attentive and Demonstrates Behavior Change     Name of Group: Coping with Depression     Progress Note  Introduced DBT concept of \" Willingness vs Willfulness\". All participated in identifying areas of their own lives where they are engaged in willful attitudes. Also discussed concept of skillful strategies to move towards willingness.  Rosy spent most of session completing tx planning with her therapist. She did participate identifying she would cook a nice meal over the wknd for her spouse to build bridges to improving the relationship.            Is this a Weekly Review of the Progress on the Treatment Plan?  Yes.      Are Treatment Plan Goals being addressed?  Yes, continue treatment goals        Are Treatment Plan Strategies to Address Goals Effective?  Yes, continue treatment strategies    "

## 2017-03-10 NOTE — PROGRESS NOTES
Group Therapy Progress Notes     Area of Treatment Focus:  Symptom Management, Personal Safety, Community Resources/Discharge Planning, Abstinence/Relapse Prevention, Develop / Improve Independent Living Skills and Develop Socialization / Interpersonal Relationship Skills    Therapeutic Interventions/Treatment Strategies:  Support, Feedback, Safety Assessments and Problem Solving    Response to Treatment Strategies:  Accepted Feedback, Gave Feedback, Listened, Focused on Goals, Attentive and Accepted Support    Name of Group:  Psychotherapy    Progress Note  Hour 1: Rosy shared with the group that she has been staying busy with the kids coming home from school for spring break. Her mood has been good and she has been staying busy with friends as well. She is looking forward to visiting with the kids over the week end.  Hour 2: Rosy participated in the group discussion about how one goes about establishing a level of trust within a group setting. Without that trust, members are not as apt to share more significant information.          Is this a Weekly Review of the Progress on the Treatment Plan?  Yes.      Are Treatment Plan Goals being addressed?  Yes, continue treatment goals      Are Treatment Plan Strategies to Address Goals Effective?  Yes, continue treatment strategies      Are there any current contracts in place?  Yes. safety

## 2017-03-14 ENCOUNTER — HOSPITAL ENCOUNTER (OUTPATIENT)
Dept: BEHAVIORAL HEALTH | Facility: CLINIC | Age: 62
End: 2017-03-14
Attending: PSYCHIATRY & NEUROLOGY
Payer: COMMERCIAL

## 2017-03-14 PROCEDURE — H2012 BEHAV HLTH DAY TREAT, PER HR: HCPCS

## 2017-03-14 NOTE — PROGRESS NOTES
"Group Therapy Progress Notes     Area of Treatment Focus:  Symptom Management, Personal Safety, Community Resources/Discharge Planning, Abstinence/Relapse Prevention, Develop / Improve Independent Living Skills and Develop Socialization / Interpersonal Relationship Skills    Therapeutic Interventions/Treatment Strategies:  Support, Feedback, Safety Assessments and Problem Solving    Response to Treatment Strategies:  Accepted Feedback, Gave Feedback, Listened, Focused on Goals, Attentive and Accepted Support    Name of Group:  Psychotherapy    Progress Note  Hour 1: Rosy shared with the group that she is excited about the kids being around for spring break. She will be busy with them, but enjoys that. She commented about her prior hospitalizations and said, \"they think that I was manic\". This was interesting, in that she had been indicating that she believed that she was manic, as well, but now seems to be back to blaming it on her  and not her own behavior.  Hour 2: Rosy participated in the group discussion regarding the idea that medication can only do so much. The individual then has to take steps to change some behavior before any real change can take place.          Is this a Weekly Review of the Progress on the Treatment Plan?  No       "

## 2017-03-15 NOTE — PROGRESS NOTES
"  Adult Mental Health Outpatient Group Therapy Progress Note     Client Initial Individualized Goals for Treatment: \"Improved mental health\".     See Initial Treatment suggestions for the client during the time between Diagnostic Assessment and completion of the Master Individualized Treatment Plan:     Treatment Goals:     see above.     Area of Treatment Focus:  Symptom Management and Develop Socialization / Interpersonal Relationship Skills    Therapeutic Interventions/Treatment Strategies:  Support, Feedback, Structured Activity and Education    Response to Treatment Strategies:  Accepted Feedback, Gave Feedback, Listened, Focused on Goals, Attentive, Accepted Support and Alert    Name of Group:  Mental health management     Description and Outcome:  Group began with structured breathing and stretching warmup. This developed into a review of \"collapse\" vs. \"yield\".  Yield was described as being more intentional and active that collapse.  This lead into an exploration of \"lean in\".  The group explored how leaning was experienced in the body and utilized each other for support in leaning.  Group identified the importance of having support when leaning toward.   Rosy was actively engaged.  She acknowledges that exercise is important to her.    Is this a Weekly Review of the Progress on the Treatment Plan?  No        "

## 2017-03-17 ENCOUNTER — HOSPITAL ENCOUNTER (OUTPATIENT)
Dept: BEHAVIORAL HEALTH | Facility: CLINIC | Age: 62
End: 2017-03-17
Attending: PSYCHIATRY & NEUROLOGY
Payer: COMMERCIAL

## 2017-03-17 PROCEDURE — H2012 BEHAV HLTH DAY TREAT, PER HR: HCPCS

## 2017-03-17 NOTE — PROGRESS NOTES
Group Therapy Progress Notes     Area of Treatment Focus:  Abstinence/Relapse Prevention, Develop / Improve Independent Living Skills and Develop Socialization / Interpersonal Relationship Skills    Therapeutic Interventions/Treatment Strategies:  Support, Feedback, Clarification and Education    Response to Treatment Strategies:  Accepted Feedback, Gave Feedback, Listened, Focused on Goals, Attentive, Accepted Support and Alert    Name of Group:  Mental Health Management     Progress Note  Rosy participated in a group looking at stages of recovery. Rosy says she is more able to distinguish grief from depression. She is more social and is returning to former activities like cooking, hosting. She notes that symptoms are less severe. Rosy says she didn't thnk she would find a group helpful but has benefitted from being in the program.          Is this a Weekly Review of the Progress on the Treatment Plan?  No

## 2017-03-17 NOTE — PROGRESS NOTES
"Group Therapy Progress Notes     Area of Treatment Focus:  Symptom Management, Personal Safety, Community Resources/Discharge Planning, Abstinence/Relapse Prevention, Develop / Improve Independent Living Skills and Develop Socialization / Interpersonal Relationship Skills    Therapeutic Interventions/Treatment Strategies:  Support, Feedback, Safety Assessments, Structured Activity, Problem Solving and Education    Response to Treatment Strategies:  Accepted Feedback, Gave Feedback, Listened, Focused on Goals, Attentive and Accepted Support    Name of Group:  Psychotherapy, Coping With Depression/Anxiety    Progress Note  Psychotherapy  Hour 1: Rosy was in very good spirits today. She really enjoys all the excitement of having the kids home from school and being in and out of the house. She had been complaining loudly to her  about how she could not find where anything was in the kitchen because they \"cleaned it up\" while she was in the hospital. Her son intervened and told her that they were cleaning and organizing for her and that she should be appreciative of their attempts to make her happy. She was able to acknowledge their efforts, but was still irked because she believed that even though everything was a mess \"she knew exactly where everything was\".  Hour 2: The group followed up on the topic of the benefit of helping others and how that it in turns helps us by reinforcing out beliefs and feeling good about ourselves.  Coping with Depression/Anxiety: The client participated in sharing weekend planning and how each member would utilize some activity over the week end to help them deal with their depression and/or anxiety.          Is this a Weekly Review of the Progress on the Treatment Plan?  Yes.      Are Treatment Plan Goals being addressed?  Yes, continue treatment goals      Are Treatment Plan Strategies to Address Goals Effective?  Yes, continue treatment strategies      Are there any current " contracts in place?  Yes. safety

## 2017-03-21 ENCOUNTER — HOSPITAL ENCOUNTER (OUTPATIENT)
Dept: BEHAVIORAL HEALTH | Facility: CLINIC | Age: 62
End: 2017-03-21
Attending: PSYCHIATRY & NEUROLOGY
Payer: COMMERCIAL

## 2017-03-21 PROCEDURE — H2012 BEHAV HLTH DAY TREAT, PER HR: HCPCS

## 2017-03-21 NOTE — PROGRESS NOTES
"Group Therapy Progress Notes     Area of Treatment Focus:  Symptom Management, Personal Safety, Community Resources/Discharge Planning, Abstinence/Relapse Prevention, Develop / Improve Independent Living Skills and Develop Socialization / Interpersonal Relationship Skills    Therapeutic Interventions/Treatment Strategies:  Support, Feedback, Safety Assessments, Problem Solving and Clarification    Response to Treatment Strategies:  Accepted Feedback, Gave Feedback, Listened, Focused on Goals, Attentive and Accepted Support    Name of Group:  Psychotherapy    Progress Note  Hour 1: Rosy shared with group that she really enjoyed the kids being around over spring break, but is glad to be able to rest now. She shared that she is working on some wedding plans with her daughter, but was disappointed by the choice they made for where they would have the ceremony. However, she will go along with it since she didn't want to raise her stress or cause problems for the kids. She referred to missing a function because her  had her \"blocked, by putting her in the hospital\". She continues to hold him responsible for her hospitalization and does not take ownership of her own part in that and minimizes the seriousness of her behavior that resulted in a stay of commitment. She believes that it is all because her  is a doctor and he could force the issue.  Hour 2: The group discussed the necessity to aware of signs of relapse as well as subtle signs of possible suicidal ideation.          Is this a Weekly Review of the Progress on the Treatment Plan?  No       "

## 2017-03-22 NOTE — PROGRESS NOTES
"  Adult Mental Health Outpatient Group Therapy Progress Note     Client Initial Individualized Goals for Treatment: \"Improved mental health\".     See Initial Treatment suggestions for the client during the time between Diagnostic Assessment and completion of the Master Individualized Treatment Plan:  Client will notify staff when needing assistance to develop or implement a coping plan to manage suicidal or self injurious urges.  Client will use coping plan for safety, as needed.  Will report on symptoms and identify skills to use to manage christiano and impulsive behavior and Will utilize structured tasks to assess and improve daily time management  Rosy will identify and connect with community resources and supports to best utilize her time and decrease isolation. Will remain in compliance with any legal requirements.       Area of Treatment Focus:  Symptom Management and Develop Socialization / Interpersonal Relationship Skills    Therapeutic Interventions/Treatment Strategies:  Support, Feedback, Structured Activity and Education    Response to Treatment Strategies:  Accepted Feedback, Gave Feedback, Listened, Focused on Goals, Attentive, Accepted Support and Alert    Name of Group:  Mental health management     Description and Outcome:  fter initial warmup, group members practised listening to body self to attend to self care/self nurturing.  Group was then encouraged to practise transition from focus on self to awareness of group, exploring support that one receives and offers group.  Individuals were offered the opportunity to \"bask\" in the support they receive from others after noting that receiving support outside  Of this program has been limited.  Rosy was actively engaged in experiential, although she did seem to have some difficulty with attention.    Is this a Weekly Review of the Progress on the Treatment Plan?  No        "

## 2017-03-24 ENCOUNTER — HOSPITAL ENCOUNTER (OUTPATIENT)
Dept: BEHAVIORAL HEALTH | Facility: CLINIC | Age: 62
End: 2017-03-24
Attending: PSYCHIATRY & NEUROLOGY
Payer: COMMERCIAL

## 2017-03-24 PROCEDURE — H2012 BEHAV HLTH DAY TREAT, PER HR: HCPCS

## 2017-03-24 NOTE — PROGRESS NOTES
Group Therapy Progress Notes     Area of Treatment Focus:  Symptom Management, Personal Safety, Community Resources/Discharge Planning, Abstinence/Relapse Prevention, Develop / Improve Independent Living Skills and Develop Socialization / Interpersonal Relationship Skills    Therapeutic Interventions/Treatment Strategies:  Support, Feedback, Safety Assessments, Structured Activity, Problem Solving and Clarification    Response to Treatment Strategies:  Accepted Feedback, Gave Feedback, Listened, Focused on Goals, Attentive and Accepted Support    Name of Group:  Psychotherapy, Mental Health Management    Progress Note : Psychotherapy  Hour 1: Rosy shared with the group that things continue to go OK. She reported that she is trying to make things up with her son, whom she locked in the downstairs while she was manic. It was a bit disconcerting that as she related this story, she did not recognize that this might be somewhat inappropriate behavior.   Hour 2: The group processed discussed the importance of recognizing signs and symptoms of relapse and the benefit if acting sooner rather than later, to avoid hospitalization. Also realizing it is not a failure on their part.  Mental Health Management: The group took time for each individual to verbal the what they have learned and gained from the group and the value of feedback in the group. Rosy showed little insight into her behavior while she was manic prior to being hospitalized and minimized the events. She tended to blame the entire episode on her 's wanting to control everything.           Is this a Weekly Review of the Progress on the Treatment Plan?  Yes.      Are Treatment Plan Goals being addressed?  Yes, continue treatment goals      Are Treatment Plan Strategies to Address Goals Effective?  Yes, continue treatment strategies      Are there any current contracts in place?  Yes. safety

## 2017-03-24 NOTE — PROGRESS NOTES
"  Adult Mental Health Outpatient Group Therapy Progress Note     Client Initial Individualized Goals for Treatment: \"Improved mental health\".     See Initial Treatment suggestions for the client during the time between Diagnostic Assessment and completion of the Master Individualized Treatment Plan:     Treatment Goals:     see above.     Area of Treatment Focus:  Symptom Management and Develop Socialization / Interpersonal Relationship Skills    Therapeutic Interventions/Treatment Strategies:  Support, Feedback, Structured Activity and Education    Response to Treatment Strategies:  Accepted Feedback, Gave Feedback, Listened, Focused on Goals, Attentive, Accepted Support and Alert    Name of Group:  Mental health management  And Wellness    Description and Outcome:   Presented information on neuroplsticity and depression, all participated.      Is this a Weekly Review of the Progress on the Treatment Plan?  Yes.      Safety Assessment:  Did not endorse suicidal thinking.    "

## 2017-03-28 ENCOUNTER — HOSPITAL ENCOUNTER (OUTPATIENT)
Dept: BEHAVIORAL HEALTH | Facility: CLINIC | Age: 62
End: 2017-03-28
Attending: PSYCHIATRY & NEUROLOGY
Payer: COMMERCIAL

## 2017-03-28 PROCEDURE — H2012 BEHAV HLTH DAY TREAT, PER HR: HCPCS

## 2017-03-28 NOTE — PROGRESS NOTES
"Group Therapy Progress Notes     Area of Treatment Focus:  Symptom Management, Personal Safety, Community Resources/Discharge Planning, Abstinence/Relapse Prevention, Develop / Improve Independent Living Skills and Develop Socialization / Interpersonal Relationship Skills    Therapeutic Interventions/Treatment Strategies:  Support, Feedback, Safety Assessments, Structured Activity, Problem Solving and Education    Response to Treatment Strategies:  Accepted Feedback, Gave Feedback, Listened, Focused on Goals, Attentive and Accepted Support    Name of Group:  Psychotherapy, Coping with Depression/Anxiety    Progress Note  Psychotherapy : Hour 1: Rosy shared with the group that she is doing well overall. She reported some difficulty sleeping last night, but this has not been a regular occurrence. She reported that her relationship with her  is doing fine, or \"at least as good as it's going to be\". Her son will be coming home for the weekend with some friends to go to a concert. She is looking forward to having some kids back under the roof again, as long as they don't stay too long. She indicated that she was tired from having her sleep disrupted last night.  Hour 2: The group reflected on past relapses and on ways to recognize the signs of relapse and avoid it in the future.  Coping with Depression/Anxiety: The client participated minimally in the group discussion of the handout \"Learn to Live in the Present Moment\". The group discussed ways that anxiety about the future prevented someone from staying in the present and the difficulty of actually living in the present moment.            Is this a Weekly Review of the Progress on the Treatment Plan?  No       "

## 2017-03-29 NOTE — PROGRESS NOTES
"  Adult Mental Health Outpatient Group Therapy Progress Note     Client Initial Individualized Goals for Treatment: \"Improved mental health\".     See Initial Treatment suggestions for the client during the time between Diagnostic Assessment and completion of the Master Individualized Treatment Plan:  Client will notify staff when needing assistance to develop or implement a coping plan to manage suicidal or self injurious urges.  Client will use coping plan for safety, as needed.  Will report on symptoms and identify skills to use to manage christiano and impulsive behavior and Will utilize structured tasks to assess and improve daily time management  Rosy will identify and connect with community resources and supports to best utilize her time and decrease isolation. Will remain in compliance with any legal requirements.       Area of Treatment Focus:  Symptom Management and Develop Socialization / Interpersonal Relationship Skills    Therapeutic Interventions/Treatment Strategies:  Support, Feedback, Structured Activity and Education    Response to Treatment Strategies:  Accepted Feedback, Gave Feedback, Listened, Focused on Goals, Attentive, Accepted Support and Alert    Name of Group:  Mental health management     Description and Outcome:  Group began with discussion on term \"embodiment\", the idea of being present or engaged with self and understanding that emotions are experienced within ourselves. Emotions, at least in part, are physiologically experiences. After an initial warmup focusing on self awareness and self care, group members explored physical expressions of a range of emotion and noticed that \"moving\" an emotion had an impact on emotional experience.  Rosy was engaged.  She asked questions regarding experience of emotion and later voiced understanding of emotion having physical component.    Is this a Weekly Review of the Progress on the Treatment Plan?  No        "

## 2017-03-31 ENCOUNTER — HOSPITAL ENCOUNTER (OUTPATIENT)
Dept: BEHAVIORAL HEALTH | Facility: CLINIC | Age: 62
End: 2017-03-31
Attending: PSYCHIATRY & NEUROLOGY
Payer: COMMERCIAL

## 2017-03-31 PROCEDURE — H2012 BEHAV HLTH DAY TREAT, PER HR: HCPCS

## 2017-03-31 NOTE — PROGRESS NOTES
"Group Therapy Progress Notes     Area of Treatment Focus:  Symptom Management, Community Resources/Discharge Planning and Develop Socialization / Interpersonal Relationship Skills    Therapeutic Interventions/Treatment Strategies:  Support, Feedback, Structured Activity, Problem Solving, Clarification and Education    Response to Treatment Strategies:  Accepted Feedback, Gave Feedback, Listened, Focused on Goals, Attentive, Accepted Support and Alert    Name of Group:  Psychotherapy       Progress Note  In session 1 client reports doing \"alright\". This week she has brought food, that she has cooked, to a number of people including her daughter that she has been estranged from. She is pleased that their relationship seems a little better.  She is also going to be hosting a group of her son's friends this weekend. She received feedback that one of the ways that she shows her love is through food and client's affect brightened. She said that her marriage is currently \"ok\" and cited a number of problems with her  including \"he's no fun, he is not a deep thinker\". Client states that she is trying to use radical acceptance with this relationship and that they will be getting marriage counseling.   In session 2 the common theme of \"self-care\" was identified. A guided discussion followed emphasizing the importance of prioritizing one's own needs, as a way to recover from mental illness.           Is this a Weekly Review of the Progress on the Treatment Plan?  Yes.      Are Treatment Plan Goals being addressed?  Yes, continue treatment goals      Are Treatment Plan Strategies to Address Goals Effective?  Yes, continue treatment strategies      Are there any current contracts in place?  No    "

## 2017-03-31 NOTE — PROGRESS NOTES
"  Adult Mental Health Outpatient Group Therapy Progress Note     Client Initial Individualized Goals for Treatment: \"Improved mental health\".     See Initial Treatment suggestions for the client during the time between Diagnostic Assessment and completion of the Master Individualized Treatment Plan:  Client will notify staff when needing assistance to develop or implement a coping plan to manage suicidal or self injurious urges.  Client will use coping plan for safety, as needed.  Will report on symptoms and identify skills to use to manage christiano and impulsive behavior and Will utilize structured tasks to assess and improve daily time management  Rosy will identify and connect with community resources and supports to best utilize her time and decrease isolation. Will remain in compliance with any legal requirements.       Area of Treatment Focus:  Symptom Management and Develop Socialization / Interpersonal Relationship Skills    Therapeutic Interventions/Treatment Strategies:  Support, Feedback, Structured Activity and Education    Response to Treatment Strategies:  Accepted Feedback, Gave Feedback, Listened, Focused on Goals, Attentive, Accepted Support and Alert    Name of Group: Coping with Depression    Description and Outcome:  Facilitated group on procrastination using worksheet. Client demonstrated understanding of session content by sharing example of resisting leaving kitchen to do exercise video but recognizing this as procrastination and successfully exercising.      Is this a Weekly Review of the Progress on the Treatment Plan?  Yes, Tx review, Yes, Safety- No suicidal ideation endorsed.         "

## 2017-03-31 NOTE — PROGRESS NOTES
"  Adult Mental Health Outpatient Group Therapy Progress Note     Client Initial Individualized Goals for Treatment: \"Improved mental health\".     See Initial Treatment suggestions for the client during the time between Diagnostic Assessment and completion of the Master Individualized Treatment Plan:  Client will notify staff when needing assistance to develop or implement a coping plan to manage suicidal or self injurious urges.  Client will use coping plan for safety, as needed.  Will report on symptoms and identify skills to use to manage christiano and impulsive behavior and Will utilize structured tasks to assess and improve daily time management  Rosy will identify and connect with community resources and supports to best utilize her time and decrease isolation. Will remain in compliance with any legal requirements.       Area of Treatment Focus:  Symptom Management and Develop Socialization / Interpersonal Relationship Skills    Therapeutic Interventions/Treatment Strategies:  Support, Feedback, Structured Activity and Education    Response to Treatment Strategies:  Accepted Feedback, Gave Feedback, Listened, Focused on Goals, Attentive, Accepted Support and Alert    Name of Group:  Mental health management     Description and Outcome:  Group members watched Augusta Russ Video on opposite action. This video provided education regarding the relationship between action and emotion. Discussion around ways in which fear can lead to \"running.\" and using approach to lessen anxiety. Reviewed anger leading to attack and use of gentle withdrawal to lessen this emotion. General discussion around deciding on justification and usefulness of either fear or anger.  Rosy was attentive and engaged.  She voiced understanding of topic while acknowledging that this skill is not easy.    Is this a Weekly Review of the Progress on the Treatment Plan?  No        "

## 2017-04-04 ENCOUNTER — HOSPITAL ENCOUNTER (OUTPATIENT)
Dept: BEHAVIORAL HEALTH | Facility: CLINIC | Age: 62
End: 2017-04-04
Attending: PSYCHIATRY & NEUROLOGY
Payer: COMMERCIAL

## 2017-04-04 PROCEDURE — H2012 BEHAV HLTH DAY TREAT, PER HR: HCPCS

## 2017-04-04 NOTE — PROGRESS NOTES
"Group Therapy Progress Notes     Area of Treatment Focus:  Symptom Management, Personal Safety, Community Resources/Discharge Planning, Abstinence/Relapse Prevention, Develop / Improve Independent Living Skills and Develop Socialization / Interpersonal Relationship Skills    Therapeutic Interventions/Treatment Strategies:  Support, Feedback, Safety Assessments, Problem Solving and Clarification    Response to Treatment Strategies:  Accepted Feedback, Gave Feedback, Listened, Focused on Goals, Attentive and Accepted Support    Name of Group:  Psychotherapy    Progress Note  Hour 1: Rosy reported to the group that she slept much of yesterday. She was quite tired from having her son and a group of his friends stay at their home. While she really enjoys it when the kids come home, it tires her out because she is so busy. She shared that she and her  are talking about their vacation plans and she doesn't feel that they are necessarily her vacation plans, as much as they are her husbands. This has been a point of contention for her and she was encouraged to bring it up in couples counseling. Unfortunately, she reported that she forgot the name of who they were referred to for couples counseling and so hasn't followed through on making an appointment.   Hour 2: In session 2 the common theme of \"self-care\" was identified. A guided discussion followed, emphasizing the importance of prioritizing one's own needs, as a way to recover from mental illness.         Is this a Weekly Review of the Progress on the Treatment Plan?  No     "

## 2017-04-04 NOTE — PROGRESS NOTES
"  Adult Mental Health Outpatient Group Therapy Progress Note     Client Initial Individualized Goals for Treatment: \"Improved mental health\".     See Initial Treatment suggestions for the client during the time between Diagnostic Assessment and completion of the Master Individualized Treatment Plan:  Client will notify staff when needing assistance to develop or implement a coping plan to manage suicidal or self injurious urges.  Client will use coping plan for safety, as needed.  Will report on symptoms and identify skills to use to manage christiano and impulsive behavior and Will utilize structured tasks to assess and improve daily time management  Rosy will identify and connect with community resources and supports to best utilize her time and decrease isolation. Will remain in compliance with any legal requirements.       Area of Treatment Focus:  Symptom Management and Develop Socialization / Interpersonal Relationship Skills    Therapeutic Interventions/Treatment Strategies:  Support, Feedback, Structured Activity and Education    Response to Treatment Strategies:  Accepted Feedback, Gave Feedback, Listened, Focused on Goals, Attentive, Accepted Support and Alert    Name of Group:  Mental health management     Description and Outcome:  Group reported distress over a peer's decision to be hospitalized.  They state understanding of it's necessity but shared how they were reminded of their own hospitalizations, and associated fears, as well as their frustration and fear of their own depression.  Group members identified desire/need to center and ground self.  Members were offered guided breathing to facilitate sense of safety and groundedness.  Eventually image of butterfly emerging from cocoon was developed.  Use of word chrysalis meaning change was used.  Group ended with \"power move\" that had developed in previous group.  Rosy was engaged, though appeared to have difficulty focusing on self during guided " breathing. She also had difficulty maintaining spatial boundaries when stretching, despite feedback.    Is this a Weekly Review of the Progress on the Treatment Plan?  No

## 2017-04-07 ENCOUNTER — HOSPITAL ENCOUNTER (OUTPATIENT)
Dept: BEHAVIORAL HEALTH | Facility: CLINIC | Age: 62
End: 2017-04-07
Attending: PSYCHIATRY & NEUROLOGY
Payer: COMMERCIAL

## 2017-04-07 PROCEDURE — H2012 BEHAV HLTH DAY TREAT, PER HR: HCPCS

## 2017-04-07 NOTE — PROGRESS NOTES
"  Adult Mental Health Outpatient Group Therapy Progress Note     Client Initial Individualized Goals for Treatment: \"Improved mental health\".     See Initial Treatment suggestions for the client during the time between Diagnostic Assessment and completion of the Master Individualized Treatment Plan:  Client will notify staff when needing assistance to develop or implement a coping plan to manage suicidal or self injurious urges.  Client will use coping plan for safety, as needed.  Will report on symptoms and identify skills to use to manage christiano and impulsive behavior and Will utilize structured tasks to assess and improve daily time management  Rosy will identify and connect with community resources and supports to best utilize her time and decrease isolation. Will remain in compliance with any legal requirements.       Area of Treatment Focus:  Symptom Management and Develop Socialization / Interpersonal Relationship Skills    Therapeutic Interventions/Treatment Strategies:  Support, Feedback, Structured Activity and Education    Response to Treatment Strategies:  Accepted Feedback, Gave Feedback, Listened, Focused on Goals, Attentive, Accepted Support and Alert    Name of Group: Coping with Depression    Description and Outcome:  Facilitated group on breath practices and gentle mindful movement as tool to address symptoms of depression such as: low energy, anxiety, racing thoughts and worry and concentration. Provided education and engaged in experiential practice of breathing while doing gentle movement in chair. Allowed time for discussion.   Client verbalized understanding of session content by sharing she is using calming breath to manage periods of anxiety.      Is this a Weekly Review of the Progress on the Treatment Plan?  Yes, Tx review, Yes, Safety- No suicidal ideation endorsed.         "

## 2017-04-11 ENCOUNTER — HOSPITAL ENCOUNTER (OUTPATIENT)
Dept: BEHAVIORAL HEALTH | Facility: CLINIC | Age: 62
End: 2017-04-11
Attending: PSYCHIATRY & NEUROLOGY
Payer: COMMERCIAL

## 2017-04-11 PROCEDURE — H2012 BEHAV HLTH DAY TREAT, PER HR: HCPCS

## 2017-04-11 NOTE — PROGRESS NOTES
Group Therapy Progress Notes     Area of Treatment Focus:  Symptom Management, Personal Safety, Community Resources/Discharge Planning, Abstinence/Relapse Prevention, Develop / Improve Independent Living Skills and Develop Socialization / Interpersonal Relationship Skills    Therapeutic Interventions/Treatment Strategies:  Support, Feedback, Safety Assessments, Structured Activity, Problem Solving and Education    Response to Treatment Strategies:  Accepted Feedback, Gave Feedback, Listened, Focused on Goals, Attentive and Accepted Support    Name of Group:  Psychotherapy  Progress Note  Hour 1: Rosy shared with the group that living with bipolar disorder is constant struggle. She feels like she is always questioning her thinking. She does not want to be hospitalized again but is wary of doing the wrong thing. She was encouraged by the group to stay on her medication and have some trust in herself. It was suggested that she also check things out with her therapist or a close friend.  Hour 2: The group continued the discussion on the impact of depression on one's decision making ability and lack of motivation        Is this a Weekly Review of the Progress on the Treatment Plan?  No

## 2017-04-14 ENCOUNTER — HOSPITAL ENCOUNTER (OUTPATIENT)
Dept: BEHAVIORAL HEALTH | Facility: CLINIC | Age: 62
End: 2017-04-14
Attending: PSYCHIATRY & NEUROLOGY
Payer: COMMERCIAL

## 2017-04-14 PROCEDURE — H2012 BEHAV HLTH DAY TREAT, PER HR: HCPCS

## 2017-04-14 NOTE — PROGRESS NOTES
"Group Therapy Progress Notes     Area of Treatment Focus:  Symptom Management, Abstinence/Relapse Prevention and Develop Socialization / Interpersonal Relationship Skills    Therapeutic Interventions/Treatment Strategies:  Support, Feedback, Clarification and Education    Response to Treatment Strategies:  Accepted Feedback, Gave Feedback, Listened, Focused on Goals, Attentive, Accepted Support and Alert    Name of Group:  Mental Health Management     Progress Note  Rosy participated in a group looking at what behaviors/thoughts/ might be keeping recovery from progressing. Rosy identified feelings of shame related to having mental illness. Looked at ways to anticipate triggers for shame and ways to reduce its effects in her relationships, helping her to keep from \"running away and hiding\" from others.          Is this a Weekly Review of the Progress on the Treatment Plan?  No     "

## 2017-04-14 NOTE — PROGRESS NOTES
"  Adult Mental Health Outpatient Group Therapy Progress Note     Client Initial Individualized Goals for Treatment: \"Improved mental health\".     See Initial Treatment suggestions for the client during the time between Diagnostic Assessment and completion of the Master Individualized Treatment Plan:  Client will notify staff when needing assistance to develop or implement a coping plan to manage suicidal or self injurious urges.  Client will use coping plan for safety, as needed.  Will report on symptoms and identify skills to use to manage christiano and impulsive behavior and Will utilize structured tasks to assess and improve daily time management  Rosy will identify and connect with community resources and supports to best utilize her time and decrease isolation. Will remain in compliance with any legal requirements.       Area of Treatment Focus:  Symptom Management and Develop Socialization / Interpersonal Relationship Skills    Therapeutic Interventions/Treatment Strategies:  Support, Feedback, Structured Activity and Education    Response to Treatment Strategies:  Accepted Feedback, Gave Feedback, Listened, Focused on Goals, Attentive, Accepted Support and Alert    Topic:Wellness and Mental Health  Progress Note  Introduced topic on Stress and the Stress Response. Together group discussed definition of stress, the sympathetic and parasympathetic nervous system and how they are implicated in both positive health and illness including depression and anxiety. Client verbalized understanding of session content by giving an example of a protective factor that reduces her stress response- Rosy identified that she is aware of her need to adjust her attitude around family get together's as she feels these times are stressful as she feels others tend to \" tell her what to do\", or feel free to \" say anything they want to her no matter how hurtful\". She is lowering expectation that get togethers will always be pleasant " and conflict free. She also identified how she has nurtured individual friendships vs group support as she finds deeper support this way. She seems to be doing well, no indications of christiano or depression and is talking more about relapse prevention skills          Is this a Weekly Review of the Progress on the Treatment Plan?  Yes, Tx review,                       Is this a Weekly Review of the Progress on the Treatment Plan?  Yes.       Are Treatment Plan Goals being addressed?  Yes, continue treatment goals          Are Treatment Plan Strategies to Address Goals Effective?  Yes, continue treatment strategies          Are there any current contracts in place?  Yes. safety No suicidal ideation endorsed.

## 2017-04-14 NOTE — PROGRESS NOTES
"Group Therapy Progress Notes     Area of Treatment Focus:  Symptom Management, Personal Safety, Community Resources/Discharge Planning, Abstinence/Relapse Prevention, Develop / Improve Independent Living Skills and Develop Socialization / Interpersonal Relationship Skills    Therapeutic Interventions/Treatment Strategies:  Support, Feedback, Safety Assessments, Structured Activity and Problem Solving    Response to Treatment Strategies:  Accepted Feedback, Gave Feedback, Listened, Focused on Goals, Attentive and Accepted Support    Name of Group:  Psychotherapy    Progress Note  Hour 1: Rosy shared with the group that she continues to do well. She is having her kids over for [place] so she is happy about that. She was to go on a vacation with some girlfriends but it has been delayed. However, in the process of speaking with them, she was made aware of the fact that they all knew that she has bipolar disorder. They noticed her bizarre behavior while she was in a manic episode. She was quite surprised and didn't think that her behavior was noticeably odd. This made her think a little bit more about her perception of herself, while she is impaired. This may be a helpful piece of information for her to develop some insight into her illness.   Hour 2: The group revisited the whole area of being \"stuck\" and the difficulty of making behavioral changes in conjunction with some changes in medication. Being aware of the fact that medication will not be the sole factor in seeing improvement in one's mental health.          Is this a Weekly Review of the Progress on the Treatment Plan?  Yes.      Are Treatment Plan Goals being addressed?  Yes, continue treatment goals      Are Treatment Plan Strategies to Address Goals Effective?  Yes, continue treatment strategies      Are there any current contracts in place?  Yes. safety           "

## 2017-04-18 ENCOUNTER — HOSPITAL ENCOUNTER (OUTPATIENT)
Dept: BEHAVIORAL HEALTH | Facility: CLINIC | Age: 62
End: 2017-04-18
Attending: PSYCHIATRY & NEUROLOGY
Payer: COMMERCIAL

## 2017-04-18 PROCEDURE — H2012 BEHAV HLTH DAY TREAT, PER HR: HCPCS

## 2017-04-18 NOTE — PROGRESS NOTES
"Group Therapy Progress Notes     Area of Treatment Focus:  Symptom Management, Personal Safety, Community Resources/Discharge Planning, Abstinence/Relapse Prevention, Develop / Improve Independent Living Skills and Develop Socialization / Interpersonal Relationship Skills    Therapeutic Interventions/Treatment Strategies:  Support, Feedback, Safety Assessments, Problem Solving, Clarification and Education    Response to Treatment Strategies:  Accepted Feedback, Gave Feedback, Listened, Focused on Goals, Attentive and Accepted Support    Name of Group:  Psychotherapy, Coping with Depression    Progress Note  Psychotherapy  Hour 1: Rosy reported that she was quite tired today. She had company all weekend and they stayed from Friday evening until Monday morning. She enjoyed the people, but was worn out by the end. She reports that she is doing well and is planning on having her neighborhood ladies over for their monthly get-together next Sunday.   Hour 2: The group utilized hour 2 expand on the concept of \"being stuck\" in depression and how the solution to the problem often turns out to be the last thing that a person wants to do.  Coping with Depression: The group read and discussed the handout \"Guidlines For Good Mental Health\". This is a list of 14 essential behaviors designed to maintain mental health and counter behaviors that tend to reinforce symptoms related to depression.        Is this a Weekly Review of the Progress on the Treatment Plan?  No       "

## 2017-04-19 NOTE — PROGRESS NOTES
"  Adult Mental Health Outpatient Group Therapy Progress Note     Client Initial Individualized Goals for Treatment: \"Improved mental health\".     See Initial Treatment suggestions for the client during the time between Diagnostic Assessment and completion of the Master Individualized Treatment Plan:  Client will notify staff when needing assistance to develop or implement a coping plan to manage suicidal or self injurious urges.  Client will use coping plan for safety, as needed.  Will report on symptoms and identify skills to use to manage christiano and impulsive behavior and Will utilize structured tasks to assess and improve daily time management  Rosy will identify and connect with community resources and supports to best utilize her time and decrease isolation. Will remain in compliance with any legal requirements.     Area of Treatment Focus:  Symptom Management and Develop Socialization / Interpersonal Relationship Skills    Therapeutic Interventions/Treatment Strategies:  Support, Feedback, Structured Activity, Clarification and Education    Response to Treatment Strategies:  Accepted Feedback, Gave Feedback, Listened, Focused on Goals, Attentive, Accepted Support and Alert    Name of Group:  Mental  Health management     Description and Outcome:  Group shared that they had discussed skills for coping with depression in previous group. Aware that both lack of energy/motivation associated with depression and habits hinder use of new coping skills. Most group members identified feeling low energy. Group was given breathing structure to encourage self soothing and self awareness. Group members were then encouraged to share a movement with group so that a variety of movements could be tried and habits challenged. Ending movement was an \"umphh\" movement. Discussion around using \"umphh\" to activiate.  Rosy was engaged, although participates in concrete way.  Limited insight.    Is this a Weekly Review of the " Progress on the Treatment Plan?  No

## 2017-04-21 ENCOUNTER — HOSPITAL ENCOUNTER (OUTPATIENT)
Dept: BEHAVIORAL HEALTH | Facility: CLINIC | Age: 62
End: 2017-04-21
Attending: PSYCHIATRY & NEUROLOGY
Payer: COMMERCIAL

## 2017-04-21 PROCEDURE — H2012 BEHAV HLTH DAY TREAT, PER HR: HCPCS

## 2017-04-21 NOTE — PROGRESS NOTES
"  Adult Mental Health Outpatient Group Therapy Progress Note     Client Initial Individualized Goals for Treatment: \"Improved mental health\".     See Initial Treatment suggestions for the client during the time between Diagnostic Assessment and completion of the Master Individualized Treatment Plan:  Client will notify staff when needing assistance to develop or implement a coping plan to manage suicidal or self injurious urges.  Client will use coping plan for safety, as needed.  Will report on symptoms and identify skills to use to manage christiano and impulsive behavior and Will utilize structured tasks to assess and improve daily time management  Rosy will identify and connect with community resources and supports to best utilize her time and decrease isolation. Will remain in compliance with any legal requirements.     Area of Treatment Focus:  Symptom Management and Develop Socialization / Interpersonal Relationship Skills    Therapeutic Interventions/Treatment Strategies:  Support, Feedback, Structured Activity and Education    Response to Treatment Strategies:  Accepted Feedback, Gave Feedback, Listened, Focused on Goals, Attentive, Accepted Support and Alert    Name of Group:  Mental health management     Description and Outcome:  Education presented on DBT skill of opposite to emotion.  Review of mood congruent behaviors associated with depression, anxiety and anger and ways in which these naturally congruent behaviors add to emotion.  Mood incongruent behaviors were identified for each emotion and purpose of practising opposite to emotion behaviors to manage/regulate emotion.  Group members were given handout and exercise to practise. Rosy shared that she had found radical acceptance a useful skill for her.  She asked clarifying questions regarding opposite to emotion, however appeared to have difficulty integrating information.  Etters.    Is this a Weekly Review of the Progress on the Treatment " Plan?  No

## 2017-04-21 NOTE — PROGRESS NOTES
"  Adult Mental Health Outpatient Group Therapy Progress Note     Client Initial Individualized Goals for Treatment: \"Improved mental health\".     See Initial Treatment suggestions for the client during the time between Diagnostic Assessment and completion of the Master Individualized Treatment Plan:  Client will notify staff when needing assistance to develop or implement a coping plan to manage suicidal or self injurious urges.  Client will use coping plan for safety, as needed.  Will report on symptoms and identify skills to use to manage christiano and impulsive behavior and Will utilize structured tasks to assess and improve daily time management  Rosy will identify and connect with community resources and supports to best utilize her time and decrease isolation. Will remain in compliance with any legal requirements.       Area of Treatment Focus:  Symptom Management and Develop Socialization / Interpersonal Relationship Skills    Therapeutic Interventions/Treatment Strategies:  Support, Feedback, Structured Activity and Education    Response to Treatment Strategies:  Accepted Feedback, Gave Feedback, Listened, Focused on Goals, Attentive, Accepted Support and Alert    Topic:Wellness and Mental Health  Progress Note  Introduced topic on Stress and the Stress Response.     Class #2 Stress and Stress Response- Reviewed last weeks material, then moved onto focusing on 3 strategies to engage the parasympathetic relaxation response: CBT, Breathing techniques, meditation. As a group we defined each strategy. Facilitated a short breathing exercise, then discussed meditation on breath as a technique to calm nervous system. All participated.  Rosy shared that she is trying to avoid certain stressors such as going all weekend every weekend. She also committed to practicing deep breathing for 1 minute a day during weekend.                        Is this a Weekly Review of the Progress on the Treatment Plan?  Yes.       Are " Treatment Plan Goals being addressed?  Yes, continue treatment goals          Are Treatment Plan Strategies to Address Goals Effective?  Yes, continue treatment strategies          Are there any current contracts in place?  Yes. safety No suicidal ideation endorsed.

## 2017-04-21 NOTE — PROGRESS NOTES
Group Therapy Progress Notes     Area of Treatment Focus:  Symptom Management, Personal Safety, Community Resources/Discharge Planning, Abstinence/Relapse Prevention, Develop / Improve Independent Living Skills and Develop Socialization / Interpersonal Relationship Skills    Therapeutic Interventions/Treatment Strategies:  Support, Feedback, Safety Assessments, Problem Solving and Education    Response to Treatment Strategies:  Accepted Feedback, Gave Feedback, Listened, Focused on Goals, Attentive and Accepted Support    Name of Group:  Psychotherapy    Progress Note  Hour 1: Rosy continues to gain a bit more insight into her illness. She does not take it as seriously as her  would like her to and this has caused some rifts in their relationship. She reports that she is sleeping well, taking medications as prescribed. She has decided that she is going to try and have things a bit less cluttered around her house. She has many small objects about the house and the deck that mean something to her, personally, but to anyone else it simply looks cluttered. So, with this in mind, she is going to clean these things out so her family doesn't feel like they are in cluttered mess. This is something she would not have considered before.  Hour 2: The group discussed thoughts and ideas regarding how to recognize improvement when you don't feel like you're improving and others do.          Is this a Weekly Review of the Progress on the Treatment Plan?  Yes.      Are Treatment Plan Goals being addressed?  Yes, continue treatment goals      Are Treatment Plan Strategies to Address Goals Effective?  Yes, continue treatment strategies      Are there any current contracts in place?  Yes. safety

## 2017-04-25 ENCOUNTER — HOSPITAL ENCOUNTER (OUTPATIENT)
Dept: BEHAVIORAL HEALTH | Facility: CLINIC | Age: 62
End: 2017-04-25
Attending: PSYCHIATRY & NEUROLOGY
Payer: COMMERCIAL

## 2017-04-25 PROCEDURE — H2012 BEHAV HLTH DAY TREAT, PER HR: HCPCS

## 2017-04-25 NOTE — PROGRESS NOTES
"Group Therapy Progress Notes     Area of Treatment Focus:  Symptom Management, Personal Safety, Community Resources/Discharge Planning, Abstinence/Relapse Prevention, Develop / Improve Independent Living Skills and Develop Socialization / Interpersonal Relationship Skills    Therapeutic Interventions/Treatment Strategies:  Support, Feedback, Safety Assessments, Structured Activity, Problem Solving and Education    Response to Treatment Strategies:  Accepted Feedback, Gave Feedback, Listened, Focused on Goals, Attentive and Accepted Support    Name of Group:  Psychotherapy, Coping with Anxiety/Depression    Progress Note  Session 1: Rosy shared with the group that she had a wonderful week end. Her  helped her with chores on Saturday and she had the neighborhood women on Sunday. She is feeling good about her progress and feels that she will be done with this program soon. She has gotten much out of it and is getting more insight into her mental illness.  Session 2: The group discussed their collective progress,as presented in session 1 and reviewed, again, their treatment goals.  Coping with Depression/Anxiety: The group watched the video by Augusta Humphries dealing with \"crisis survival skills\". The group discussed the film and how these skills might be applied in their own lives.          Is this a Weekly Review of the Progress on the Treatment Plan?  No       "

## 2017-04-26 NOTE — PROGRESS NOTES
"  Adult Mental Health Outpatient Group Therapy Progress Note     Client Initial Individualized Goals for Treatment: \"Improved mental health\".     See Initial Treatment suggestions for the client during the time between Diagnostic Assessment and completion of the Master Individualized Treatment Plan:  Client will notify staff when needing assistance to develop or implement a coping plan to manage suicidal or self injurious urges.  Client will use coping plan for safety, as needed.  Will report on symptoms and identify skills to use to manage christiano and impulsive behavior and Will utilize structured tasks to assess and improve daily time management  Rosy will identify and connect with community resources and supports to best utilize her time and decrease isolation. Will remain in compliance with any legal requirements.       Area of Treatment Focus:  Symptom Management and Develop Socialization / Interpersonal Relationship Skills    Therapeutic Interventions/Treatment Strategies:  Support, Feedback, Structured Activity and Education    Response to Treatment Strategies:  Accepted Feedback, Gave Feedback, Listened, Focused on Goals, Attentive, Accepted Support and Alert    Name of Group:  Mental health management     Description and Outcome:  Group members were encouraged to focus on breathe as a way of checking in with self.  Requests were made for lively music which initiated the prompt to experience the rhythm in different body parts.  As a follow up to previous week discussion, the group experimented with embodying different emotions and then the opposite movement (i.e fear avoid/approach).  Group members were encouraged to \"commit\" to the movement they were doing and notice how the movement changed.  The analogy was made between committing to a movement and committing to change,skills, attitude, etc.  Rosy was engaged and participated actively, though seems to be concrete in her approach.    Is this a Weekly " Review of the Progress on the Treatment Plan?  No

## 2017-04-28 ENCOUNTER — HOSPITAL ENCOUNTER (OUTPATIENT)
Dept: BEHAVIORAL HEALTH | Facility: CLINIC | Age: 62
End: 2017-04-28
Attending: PSYCHIATRY & NEUROLOGY
Payer: COMMERCIAL

## 2017-04-28 PROCEDURE — H2012 BEHAV HLTH DAY TREAT, PER HR: HCPCS

## 2017-04-28 NOTE — PROGRESS NOTES
"Group Therapy Progress Notes     Area of Treatment Focus:  Symptom Management, Personal Safety, Community Resources/Discharge Planning, Abstinence/Relapse Prevention, Develop / Improve Independent Living Skills and Develop Socialization / Interpersonal Relationship Skills    Therapeutic Interventions/Treatment Strategies:  Support, Feedback, Safety Assessments, Structured Activity, Problem Solving and Education    Response to Treatment Strategies:  Accepted Feedback, Gave Feedback, Listened, Focused on Goals, Attentive and Accepted Support    Name of Group:  Psychotherapy, Mental Health Management    Progress Note  Session 1: Rosy shared with the group that she is feeling good about moving on and is trying to be a bit more active. She reported that she is having some trouble with sleep. She reports that she will wake up for several hours in the middle of the night and then sleep much later in the morning, which interferes with her daytime schedule at times.   Session 2: The group collectively discussed the things that can keep one \"stuck\" and feel like they cannot make progress or engage in new behaviors.    Mental Health Management: Clients were introduced to feelings as important information regarding whether or not their values, needs, or desires are being met. They also identified repetitive themes and \"true feeling words\" and \"true needs\", to prepare to self assert.           Is this a Weekly Review of the Progress on the Treatment Plan?  Yes.      Are Treatment Plan Goals being addressed?  Yes, continue treatment goals      Are Treatment Plan Strategies to Address Goals Effective?  Yes, continue treatment strategies      Are there any current contracts in place?  Yes. safety           "

## 2017-04-28 NOTE — PROGRESS NOTES
"Group Therapy Progress Notes     Area of Treatment Focus:  Symptom Management, Abstinence/Relapse Prevention and Develop Socialization / Interpersonal Relationship Skills    Therapeutic Interventions/Treatment Strategies:  Support, Feedback, Clarification and Education    Response to Treatment Strategies:  Accepted Feedback, Listened, Focused on Goals, Attentive, Accepted Support and Alert    Name of Group:  Coping with Depression and Anxiety     Progress Note  Rosy participated in a group on the topic of helping your support system understand and help you during recovery from depression. Looked at communication, privacy, and what support is. Rosy says she is trying to grow comfortable with a shift in relationships where others are caring for her, especially her children but also a group of friends. She says she is practicing radical acceptance in regards to the situation. Rosy also says she doesn't like comparing depression to other chronic conditions like diabetes because diabetes seems more straightforward and more objective in its treatment. Looked at the difficulty of \"sorting out\" what is due to depression and what is not in trying to deal with interpersonal relationships while depressed.           Is this a Weekly Review of the Progress on the Treatment Plan?  No     "

## 2017-05-02 ENCOUNTER — HOSPITAL ENCOUNTER (OUTPATIENT)
Dept: BEHAVIORAL HEALTH | Facility: CLINIC | Age: 62
End: 2017-05-02
Attending: PSYCHIATRY & NEUROLOGY
Payer: COMMERCIAL

## 2017-05-02 PROCEDURE — H2012 BEHAV HLTH DAY TREAT, PER HR: HCPCS

## 2017-05-02 NOTE — PROGRESS NOTES
Group Therapy Progress Notes     Area of Treatment Focus:  Symptom Management, Personal Safety, Community Resources/Discharge Planning, Abstinence/Relapse Prevention, Develop / Improve Independent Living Skills and Develop Socialization / Interpersonal Relationship Skills    Therapeutic Interventions/Treatment Strategies:  Support, Feedback, Safety Assessments and Problem Solving    Response to Treatment Strategies:  Accepted Feedback, Gave Feedback, Listened, Focused on Goals, Attentive and Accepted Support    Name of Group:  Psychotherapy    Progress Note  Session 1: Rosy shared with the group that she felt she was doing pretty well. She and her  had gone to [employer], [place] for a day for a dinner with their son who is a student there. She was disappointed to find that a pt she had met in the hospital had been discharged and now she doesn't know where he went. This is a questionable relationship, as he was a 22 year old young man, who, by her description, was profoundly mentally ill. They had met when she was in the hospital and she continued to visit him after she was discharged. He had a legal guardian assigned to him and she was informed that the hospital would not give her any information without the consent of the guardian.   Session 2: The group discussed how being intentionally mindful of their behavior can often prevent a relapse. Particularly by noticing things like increased energy and lack of sleep signaling possible christiano.          Is this a Weekly Review of the Progress on the Treatment Plan?  No

## 2017-05-03 NOTE — PROGRESS NOTES
"  Adult Mental Health Outpatient Group Therapy Progress Note     Client Initial Individualized Goals for Treatment: \"Improved mental health\".     See Initial Treatment suggestions for the client during the time between Diagnostic Assessment and completion of the Master Individualized Treatment Plan:  Client will notify staff when needing assistance to develop or implement a coping plan to manage suicidal or self injurious urges.  Client will use coping plan for safety, as needed.  Will report on symptoms and identify skills to use to manage christiano and impulsive behavior and Will utilize structured tasks to assess and improve daily time management  Rosy will identify and connect with community resources and supports to best utilize her time and decrease isolation. Will remain in compliance with any legal requirements.       Area of Treatment Focus:  Symptom Management and Develop Socialization / Interpersonal Relationship Skills    Therapeutic Interventions/Treatment Strategies:  Support, Feedback, Structured Activity and Education    Response to Treatment Strategies:  Accepted Feedback, Gave Feedback, Listened, Focused on Goals, Attentive, Accepted Support and Alert    Name of Group:  Mental health management     Description and Outcome:  Group began by sharing information learned in previous group.  Identified focus on \"negative\" emotions and requested a balance.  Provided structure for group to create their own \"dance.\"  Each member contributed one movement that could be repeated and the movements were put together in a flow.  The \"power movements\" that group developed in past was ended.  The dance was entitled \"motley crew.\"  Discussion around how disparate pieces fit together, sometimes requiring an adaptation and at times, not perfectly.  Rosy was engaged.  She seemed concerned with \"behaving\" in a certain way.  Reminded that goal of group was not to behave in certain way but to be self.    Is this a Weekly " Review of the Progress on the Treatment Plan?  No

## 2017-05-05 ENCOUNTER — HOSPITAL ENCOUNTER (OUTPATIENT)
Dept: BEHAVIORAL HEALTH | Facility: CLINIC | Age: 62
End: 2017-05-05
Attending: PSYCHIATRY & NEUROLOGY
Payer: COMMERCIAL

## 2017-05-05 PROCEDURE — H2012 BEHAV HLTH DAY TREAT, PER HR: HCPCS

## 2017-05-05 NOTE — PROGRESS NOTES
"  Adult Mental Health Outpatient Group Therapy Progress Note     Client Initial Individualized Goals for Treatment: \"Improved mental health\".     See Initial Treatment suggestions for the client during the time between Diagnostic Assessment and completion of the Master Individualized Treatment Plan:  Client will notify staff when needing assistance to develop or implement a coping plan to manage suicidal or self injurious urges.  Client will use coping plan for safety, as needed.  Will report on symptoms and identify skills to use to manage christiano and impulsive behavior and Will utilize structured tasks to assess and improve daily time management  Rosy will identify and connect with community resources and supports to best utilize her time and decrease isolation. Will remain in compliance with any legal requirements.       Area of Treatment Focus:  Symptom Management and Develop Socialization / Interpersonal Relationship Skills    Therapeutic Interventions/Treatment Strategies:  Support, Feedback, Structured Activity and Education    Response to Treatment Strategies:  Accepted Feedback, Gave Feedback, Listened, Focused on Goals, Attentive, Accepted Support and Alert    Topic:Wellness and Mental Health  Progress Note  Introduced topic on Stress and the Stress Response.     Leisure, weekend planning activity- Each member was directed to brain storm ideas for their wellness that included: spending time alone, time in nature, time with others. Rosy appears to be getting close to baseline. She has good eye contact, seems calmer, more assertive, is taking her meds, able to easily engage in this activity. Her ideas were: ride her bicycle, do some yoga, grill on the deck, and put some plants in the ground. Client verbalized understanding of session content by connecting this to maintaining mental health.                               Is this a Weekly Review of the Progress on the Treatment Plan?  Yes.       Are Treatment " Plan Goals being addressed?  Yes, continue treatment goals          Are Treatment Plan Strategies to Address Goals Effective?  Yes, continue treatment strategies          Are there any current contracts in place?  Yes. safety No suicidal ideation endorsed.

## 2017-05-05 NOTE — PROGRESS NOTES
Group Therapy Progress Notes     Area of Treatment Focus:  Symptom Management, Personal Safety, Community Resources/Discharge Planning, Abstinence/Relapse Prevention, Develop / Improve Independent Living Skills and Develop Socialization / Interpersonal Relationship Skills    Therapeutic Interventions/Treatment Strategies:  Support, Feedback, Safety Assessments and Problem Solving    Response to Treatment Strategies:  Accepted Feedback, Gave Feedback, Listened, Focused on Goals, Attentive and Accepted Support    Name of Group:  Psychotherapy    Progress Note  Session 1: Rosy appears to be doing well and is considering discharge soon. Her son had his car stolen and she handled the situation very well and was able to be supportive of her son. She has been getting together with friends and attended a [employer] meeting again. This time the meeting went better and she was encouraged by that. She feels that she is more likely to attend this regularly for support.  Session 2: The group discussed how the this batch of nice weather has impacted their moods. It also served as good reminder that when they are forewarned that it is not going to be nice, to take steps to plan something positive to counter the apparent affects of weather on their mood.          Is this a Weekly Review of the Progress on the Treatment Plan?  Yes.      Are Treatment Plan Goals being addressed?  Yes, continue treatment goals      Are Treatment Plan Strategies to Address Goals Effective?  Yes, continue treatment strategies      Are there any current contracts in place?  Yes. safety

## 2017-05-05 NOTE — PROGRESS NOTES
"  Adult Mental Health Outpatient Group Therapy Progress Note     Client Initial Individualized Goals for Treatment: \"Improved mental health\".     See Initial Treatment suggestions for the client during the time between Diagnostic Assessment and completion of the Master Individualized Treatment Plan:  Client will notify staff when needing assistance to develop or implement a coping plan to manage suicidal or self injurious urges.  Client will use coping plan for safety, as needed.  Will report on symptoms and identify skills to use to manage christiano and impulsive behavior and Will utilize structured tasks to assess and improve daily time management  Rosy will identify and connect with community resources and supports to best utilize her time and decrease isolation. Will remain in compliance with any legal requirements.     Area of Treatment Focus:  Symptom Management and Develop Socialization / Interpersonal Relationship Skills    Therapeutic Interventions/Treatment Strategies:  Support, Feedback, Structured Activity and Education    Response to Treatment Strategies:  Accepted Feedback, Gave Feedback, Listened, Focused on Goals, Attentive, Accepted Support and Alert    Name of Group:  Mental health management     Description and Outcome:  Group viewed Geraldo Pena talk on trust.  Reviewed what it means to be \"braving\" trust and connection.  Group members commented on their self trust as well as what it is like to trust others.   Rosy was engaged and asked clarifying questions.  She took notes throughout video.    Is this a Weekly Review of the Progress on the Treatment Plan?  No        "

## 2017-05-09 ENCOUNTER — HOSPITAL ENCOUNTER (OUTPATIENT)
Dept: BEHAVIORAL HEALTH | Facility: CLINIC | Age: 62
End: 2017-05-09
Attending: PSYCHIATRY & NEUROLOGY
Payer: COMMERCIAL

## 2017-05-09 PROCEDURE — H2012 BEHAV HLTH DAY TREAT, PER HR: HCPCS

## 2017-05-09 NOTE — PROGRESS NOTES
"Group Therapy Progress Notes     Area of Treatment Focus:  Symptom Management, Personal Safety, Community Resources/Discharge Planning, Abstinence/Relapse Prevention, Develop / Improve Independent Living Skills and Develop Socialization / Interpersonal Relationship Skills    Therapeutic Interventions/Treatment Strategies:  Support, Feedback, Safety Assessments and Problem Solving    Response to Treatment Strategies:  Accepted Feedback, Gave Feedback, Listened, Focused on Goals, Attentive and Accepted Support    Name of Group:  Psychotherapy, Coping with Depression/Anxiety    Progress Note  Psychotherapy  Session 1: Rosy shared with the group that she is having some problems with her \"foster\" daughter, who is really just a very troubled friend of her daughter. She feels sorry for this person, but finds that her boundaries are continually violated and her other children really don't care for this person. The group provided feedback about how to set and keep stronger boundaries and reassured her that it doesn't make her a bad person for setting limits. Rosy greatly appreciated the feedback. Rosy also announced to the group that Friday will be her last day.  Session 2: The group expounded upon the theme that they are not their illness and it is the illness of depression that is permeating their life and stealing their motivation.  Coping with Depression/Anxiety: The group read and discussed the poem \"Wild Geese\" by Ijeoma Vogel. They shared ideas on how this could be interpreted in the context of dealing with their own depression. The group collectively felt it was useful in changing how they viewed their depression and how it didn't necessarily indicate that they were alone in the world and unforgivable.         Is this a Weekly Review of the Progress on the Treatment Plan?  No       "

## 2017-05-10 NOTE — PROGRESS NOTES
"  Adult Mental Health Outpatient Group Therapy Progress Note     Client Initial Individualized Goals for Treatment: \"Improved mental health\".     See Initial Treatment suggestions for the client during the time between Diagnostic Assessment and completion of the Master Individualized Treatment Plan:  Client will notify staff when needing assistance to develop or implement a coping plan to manage suicidal or self injurious urges.  Client will use coping plan for safety, as needed.  Will report on symptoms and identify skills to use to manage christiano and impulsive behavior and Will utilize structured tasks to assess and improve daily time management  Rosy will identify and connect with community resources and supports to best utilize her time and decrease isolation. Will remain in compliance with any legal requirements.     Area of Treatment Focus:  Symptom Management and Develop Socialization / Interpersonal Relationship Skills    Therapeutic Interventions/Treatment Strategies:  Support, Feedback, Structured Activity, Clarification and Education    Response to Treatment Strategies:  Accepted Feedback, Gave Feedback, Listened, Focused on Goals, Attentive, Accepted Support and Alert    Name of Group:  Mental health management     Description and Outcome:  Group reviewed importance of body awareness and self awareness as part of managing stress/emotion.  Reviewed concept of \"embodiment\".  Movement warmup focused on breathe and body scan with direction to listen to parts of oneself that may need attending to.  Provided to explore range of movement repetoire, including use of flow.  Flow was described as follow through of a movement, either free flow or bound flow. Rosy  Asked follow up questions.  She remains concrete with limited insight and self awareness.    Is this a Weekly Review of the Progress on the Treatment Plan?  No        "

## 2017-05-12 ENCOUNTER — HOSPITAL ENCOUNTER (OUTPATIENT)
Dept: BEHAVIORAL HEALTH | Facility: CLINIC | Age: 62
End: 2017-05-12
Attending: PSYCHIATRY & NEUROLOGY
Payer: COMMERCIAL

## 2017-05-12 PROCEDURE — H2012 BEHAV HLTH DAY TREAT, PER HR: HCPCS

## 2017-05-12 NOTE — PROGRESS NOTES
Group Therapy Progress Notes     Area of Treatment Focus:  Symptom Management, Personal Safety, Community Resources/Discharge Planning, Abstinence/Relapse Prevention, Develop / Improve Independent Living Skills and Develop Socialization / Interpersonal Relationship Skills    Therapeutic Interventions/Treatment Strategies:  Support, Feedback, Safety Assessments, Structured Activity and Problem Solving    Response to Treatment Strategies:  Accepted Feedback, Gave Feedback, Listened, Focused on Goals, Attentive and Accepted Support    Name of Group:  Psychotherapy, Mental Health Management    Progress Note  Psychotherapy  Session 1: Today was Rosy's final day in the 55+ program. She stated that she was feeling sad and would miss everyone in the group, but that it was a good time for her to move on. She stated that she was grateful for all that she had learned and intended to implement these lessons into her life. She laughed at how when she first started she did not want to be here at all and now was feeling like she didn't want to leave. She seemed to have a better understanding of herself and her mental illness and was willing to take responsibility for taking care of herself in the future. She was stable and doing well.  Session 2: The group continued to discuss the theme present in session 1 of how to pay attention to the small gains they have made. These are often overlooked but are essential building blocks to the larger gains that are hoped for.   Mental Health Management: The group took time to say good bye to a member finishing the program today. This also presented the opportunity for all of the group members to review the changes they have made thus far in the program and identify more specifically what it is they would like to accomplish.         Is this a Weekly Review of the Progress on the Treatment Plan?  Yes.      Are Treatment Plan Goals being addressed?  Client discharged      Are Treatment Plan  Strategies to Address Goals Effective?  Client discharged      Are there any current contracts in place?  No

## 2017-05-12 NOTE — PROGRESS NOTES
"  Adult Mental Health Outpatient Group Therapy Progress Note     Client Initial Individualized Goals for Treatment: \"Improved mental health\".     See Initial Treatment suggestions for the client during the time between Diagnostic Assessment and completion of the Master Individualized Treatment Plan:  Client will notify staff when needing assistance to develop or implement a coping plan to manage suicidal or self injurious urges.  Client will use coping plan for safety, as needed.  Will report on symptoms and identify skills to use to manage christiano and impulsive behavior and Will utilize structured tasks to assess and improve daily time management  Rosy will identify and connect with community resources and supports to best utilize her time and decrease isolation. Will remain in compliance with any legal requirements.       Area of Treatment Focus:  Symptom Management and Develop Socialization / Interpersonal Relationship Skills    Therapeutic Interventions/Treatment Strategies:  Support, Feedback, Structured Activity and Education    Response to Treatment Strategies:  Accepted Feedback, Gave Feedback, Listened, Focused on Goals, Attentive, Accepted Support and Alert    Topic:Wellness and Mental Health  Progress Note  Presented review on the stress response and then discussed the concept of loving kindness and equanimity. Rosy verbalized understanding of session content by sharing how she ignored many stressors last summer, then had a horrible argument with her mother and ultimately had a psychiatric crisis. Looking back she sees that she missed all the early warning signs. She expressed more commitment to her own self care and ongoing recovery.                Tx Plan review of safety- No suicidal ideation endorsed.             "

## 2017-05-15 ASSESSMENT — ANXIETY QUESTIONNAIRES
1. FEELING NERVOUS, ANXIOUS, OR ON EDGE: NOT AT ALL
7. FEELING AFRAID AS IF SOMETHING AWFUL MIGHT HAPPEN: NOT AT ALL
6. BECOMING EASILY ANNOYED OR IRRITABLE: NOT AT ALL
3. WORRYING TOO MUCH ABOUT DIFFERENT THINGS: SEVERAL DAYS
GAD7 TOTAL SCORE: 3
5. BEING SO RESTLESS THAT IT IS HARD TO SIT STILL: NOT AT ALL
IF YOU CHECKED OFF ANY PROBLEMS ON THIS QUESTIONNAIRE, HOW DIFFICULT HAVE THESE PROBLEMS MADE IT FOR YOU TO DO YOUR WORK, TAKE CARE OF THINGS AT HOME, OR GET ALONG WITH OTHER PEOPLE: SOMEWHAT DIFFICULT
2. NOT BEING ABLE TO STOP OR CONTROL WORRYING: SEVERAL DAYS

## 2017-05-15 ASSESSMENT — PATIENT HEALTH QUESTIONNAIRE - PHQ9: 5. POOR APPETITE OR OVEREATING: SEVERAL DAYS

## 2017-05-16 ASSESSMENT — ANXIETY QUESTIONNAIRES: GAD7 TOTAL SCORE: 3

## 2017-05-16 ASSESSMENT — PATIENT HEALTH QUESTIONNAIRE - PHQ9: SUM OF ALL RESPONSES TO PHQ QUESTIONS 1-9: 5

## 2017-06-13 ENCOUNTER — OFFICE VISIT (OUTPATIENT)
Dept: FAMILY MEDICINE | Facility: CLINIC | Age: 62
End: 2017-06-13
Payer: COMMERCIAL

## 2017-06-13 VITALS
DIASTOLIC BLOOD PRESSURE: 78 MMHG | TEMPERATURE: 97.6 F | BODY MASS INDEX: 36.68 KG/M2 | SYSTOLIC BLOOD PRESSURE: 122 MMHG | OXYGEN SATURATION: 96 % | HEART RATE: 77 BPM | WEIGHT: 234.2 LBS

## 2017-06-13 DIAGNOSIS — F33.1 MAJOR DEPRESSIVE DISORDER, RECURRENT EPISODE, MODERATE (H): ICD-10-CM

## 2017-06-13 DIAGNOSIS — Z00.00 ROUTINE GENERAL MEDICAL EXAMINATION AT A HEALTH CARE FACILITY: Primary | ICD-10-CM

## 2017-06-13 DIAGNOSIS — F31.70 BIPOLAR AFFECTIVE DISORDER IN REMISSION (H): ICD-10-CM

## 2017-06-13 DIAGNOSIS — R73.9 HYPERGLYCEMIA: ICD-10-CM

## 2017-06-13 DIAGNOSIS — Z12.4 SCREENING FOR MALIGNANT NEOPLASM OF CERVIX: ICD-10-CM

## 2017-06-13 DIAGNOSIS — Z12.11 SCREEN FOR COLON CANCER: ICD-10-CM

## 2017-06-13 DIAGNOSIS — Z11.59 NEED FOR HEPATITIS C SCREENING TEST: ICD-10-CM

## 2017-06-13 DIAGNOSIS — Z23 NEED FOR PROPHYLACTIC VACCINATION WITH TETANUS-DIPHTHERIA (TD): ICD-10-CM

## 2017-06-13 LAB
ANION GAP SERPL CALCULATED.3IONS-SCNC: 8 MMOL/L (ref 3–14)
BUN SERPL-MCNC: 15 MG/DL (ref 7–30)
CALCIUM SERPL-MCNC: 9.1 MG/DL (ref 8.5–10.1)
CHLORIDE SERPL-SCNC: 110 MMOL/L (ref 94–109)
CHOLEST SERPL-MCNC: 251 MG/DL
CO2 SERPL-SCNC: 24 MMOL/L (ref 20–32)
CREAT SERPL-MCNC: 0.92 MG/DL (ref 0.52–1.04)
GFR SERPL CREATININE-BSD FRML MDRD: 62 ML/MIN/1.7M2
GLUCOSE SERPL-MCNC: 106 MG/DL (ref 70–99)
HBA1C MFR BLD: 5.6 % (ref 4.3–6)
HDLC SERPL-MCNC: 48 MG/DL
HGB BLD-MCNC: 14.2 G/DL (ref 11.7–15.7)
LDLC SERPL CALC-MCNC: 177 MG/DL
NONHDLC SERPL-MCNC: 203 MG/DL
POTASSIUM SERPL-SCNC: 3.8 MMOL/L (ref 3.4–5.3)
SODIUM SERPL-SCNC: 142 MMOL/L (ref 133–144)
TRIGL SERPL-MCNC: 130 MG/DL

## 2017-06-13 PROCEDURE — 36415 COLL VENOUS BLD VENIPUNCTURE: CPT | Performed by: FAMILY MEDICINE

## 2017-06-13 PROCEDURE — 86803 HEPATITIS C AB TEST: CPT | Performed by: FAMILY MEDICINE

## 2017-06-13 PROCEDURE — 87624 HPV HI-RISK TYP POOLED RSLT: CPT | Performed by: FAMILY MEDICINE

## 2017-06-13 PROCEDURE — 83036 HEMOGLOBIN GLYCOSYLATED A1C: CPT | Performed by: FAMILY MEDICINE

## 2017-06-13 PROCEDURE — 99396 PREV VISIT EST AGE 40-64: CPT | Mod: 25 | Performed by: FAMILY MEDICINE

## 2017-06-13 PROCEDURE — G0145 SCR C/V CYTO,THINLAYER,RESCR: HCPCS | Performed by: FAMILY MEDICINE

## 2017-06-13 PROCEDURE — 90715 TDAP VACCINE 7 YRS/> IM: CPT | Performed by: FAMILY MEDICINE

## 2017-06-13 PROCEDURE — 80048 BASIC METABOLIC PNL TOTAL CA: CPT | Performed by: FAMILY MEDICINE

## 2017-06-13 PROCEDURE — 85018 HEMOGLOBIN: CPT | Performed by: FAMILY MEDICINE

## 2017-06-13 PROCEDURE — 80061 LIPID PANEL: CPT | Performed by: FAMILY MEDICINE

## 2017-06-13 PROCEDURE — 90471 IMMUNIZATION ADMIN: CPT | Performed by: FAMILY MEDICINE

## 2017-06-13 RX ORDER — OLANZAPINE 10 MG/1
TABLET ORAL
Refills: 0 | COMMUNITY
Start: 2017-04-11 | End: 2017-06-13

## 2017-06-13 ASSESSMENT — ANXIETY QUESTIONNAIRES
3. WORRYING TOO MUCH ABOUT DIFFERENT THINGS: SEVERAL DAYS
5. BEING SO RESTLESS THAT IT IS HARD TO SIT STILL: NOT AT ALL
1. FEELING NERVOUS, ANXIOUS, OR ON EDGE: NOT AT ALL
2. NOT BEING ABLE TO STOP OR CONTROL WORRYING: SEVERAL DAYS
6. BECOMING EASILY ANNOYED OR IRRITABLE: NOT AT ALL
7. FEELING AFRAID AS IF SOMETHING AWFUL MIGHT HAPPEN: NOT AT ALL
GAD7 TOTAL SCORE: 2
IF YOU CHECKED OFF ANY PROBLEMS ON THIS QUESTIONNAIRE, HOW DIFFICULT HAVE THESE PROBLEMS MADE IT FOR YOU TO DO YOUR WORK, TAKE CARE OF THINGS AT HOME, OR GET ALONG WITH OTHER PEOPLE: NOT DIFFICULT AT ALL

## 2017-06-13 ASSESSMENT — PATIENT HEALTH QUESTIONNAIRE - PHQ9: 5. POOR APPETITE OR OVEREATING: NOT AT ALL

## 2017-06-13 NOTE — NURSING NOTE
"Chief Complaint   Patient presents with     Physical       Initial /78 (BP Location: Right arm, Patient Position: Sitting, Cuff Size: Adult Large)  Pulse 77  Temp 97.6  F (36.4  C) (Oral)  Wt 106.2 kg (234 lb 3.2 oz)  LMP 12/07/2007  SpO2 96%  BMI 36.68 kg/m2 Estimated body mass index is 36.68 kg/(m^2) as calculated from the following:    Height as of 1/17/17: 1.702 m (5' 7\").    Weight as of this encounter: 106.2 kg (234 lb 3.2 oz).  Medication Reconciliation: complete       Erma Linder CMA      "

## 2017-06-13 NOTE — PROGRESS NOTES
"Please print letter and attach results.  PSK      Attached you will find a copy of your recent laboratory report.  Your cholesterol is elevated but not in a level that would warrant cholesterol lowering medication.  I would recommend dietary measures to lower cholesterol and exercise.  Your blood sugar is borderline elevated.  This is in the \"prediabetic\" range.  Exercise and limiting carbohydrate and sugars in diet can be helpful at preventing progression to diabetes.  The long term blood sugar testing is normal indicating you do not have diabetes.  Your hemoglobin is normal.  Please call or MyChart message me if you have any questions.    Sincerely,         Yina Ham MD     "
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
## 2017-06-13 NOTE — MR AVS SNAPSHOT
After Visit Summary   6/13/2017    Rosy Jean    MRN: [id number]           Patient Information     Date Of Birth          1955        Visit Information        Provider Department      6/13/2017 10:00 AM Yina Ham MD [facility]        Today's Diagnoses     Routine general medical examination at a health care facility    -  1    Screen for colon cancer        Screening for malignant neoplasm of cervix        Need for hepatitis C screening test        Need for prophylactic vaccination with tetanus-diphtheria (TD)        Hyperglycemia          Care Instructions      Preventive Health Recommendations  Female Ages 50 - 64    Yearly exam: See your health care provider every year in order to  o Review health changes.   o Discuss preventive care.    o Review your medicines if your doctor has prescribed any.      Get a Pap test every three years (unless you have an abnormal result and your provider advises testing more often).    If you get Pap tests with HPV test, you only need to test every 5 years, unless you have an abnormal result.     You do not need a Pap test if your uterus was removed (hysterectomy) and you have not had cancer.    You should be tested each year for STDs (sexually transmitted diseases) if you're at risk.     Have a mammogram every 1 to 2 years.    Have a colonoscopy at age 50, or have a yearly FIT test (stool test). These exams screen for colon cancer.      Have a cholesterol test every 5 years, or more often if advised.    Have a diabetes test (fasting glucose) every three years. If you are at risk for diabetes, you should have this test more often.     If you are at risk for osteoporosis (brittle bone disease), think about having a bone density scan (DEXA).    Shots: Get a flu shot each year. Get a tetanus shot every 10 years.    Nutrition:     Eat at least 5 servings of fruits and vegetables each day.    Eat whole-grain bread, whole-wheat pasta and  brown rice instead of white grains and rice.    Talk to your provider about Calcium and Vitamin D.     Lifestyle    Exercise at least 150 minutes a week (30 minutes a day, 5 days a week). This will help you control your weight and prevent disease.    Limit alcohol to one drink per day.    No smoking.     Wear sunscreen to prevent skin cancer.     See your dentist every six months for an exam and cleaning.    See your eye doctor every 1 to 2 years.            Follow-ups after your visit        Future tests that were ordered for you today     Open Future Orders        Priority Expected Expires Ordered    Fecal colorectal cancer screen (FIT) Routine 7/4/2017 9/5/2017 6/13/2017            Who to contact     If you have questions or need follow up information about today's clinic visit or your schedule please contact [facility] directly at [phone].  Normal or non-critical lab and imaging results will be communicated to you by [employer], letter or phone within 4 business days after the clinic has received the results. If you do not hear from us within 7 days, please contact the clinic through [employer] or phone. If you have a critical or abnormal lab result, we will notify you by phone as soon as possible.  Submit refill requests through [employer] or call your pharmacy and they will forward the refill request to us. Please allow 3 business days for your refill to be completed.          Additional Information About Your Visit        [employer] Information     [employer] gives you secure access to your electronic health record. If you see a primary care provider, you can also send messages to your care team and make appointments. If you have questions, please call your primary care clinic.  If you do not have a primary care provider, please call [phone] and they will assist you.        Care EveryWhere ID     This is your Care EveryWhere ID. This could be used by other organizations to access your [place]  medical records  [id number]        Your Vitals Were     Pulse Temperature Last Period Pulse Oximetry BMI (Body Mass Index)       77 97.6  F (36.4  C) (Oral) 12/07/2007 96% 36.68 kg/m2        Blood Pressure from Last 3 Encounters:   06/13/17 122/78   01/04/17 134/89   03/24/14 110/70    Weight from Last 3 Encounters:   06/13/17 106.2 kg (234 lb 3.2 oz)   01/17/17 94.8 kg (209 lb)   01/08/17 94.8 kg (209 lb)              We Performed the Following     Basic metabolic panel     Hemoglobin A1c     Hemoglobin     Hepatitis C Screen Reflex to HCV RNA Quant and Genotype     HPV High Risk Types DNA Cervical     LIPID REFLEX TO DIRECT LDL PANEL     Pap imaged thin layer screen with HPV - recommended age 30 - 65 years (select HPV order below)     TDAP VACCINE (ADACEL)          Today's Medication Changes          These changes are accurate as of: 6/13/17 10:50 AM.  If you have any questions, ask your nurse or doctor.               These medicines have changed or have updated prescriptions.        Dose/Directions    lithium 300 MG CR tablet   Commonly known as:  ESKALITH/LITHOBID   This may have changed:  Another medication with the same name was removed. Continue taking this medication, and follow the directions you see here.   Used for:  Bipolar I disorder with christiano (H)        Dose:  600 mg   Take 2 tablets (600 mg) by mouth every morning   Quantity:  60 tablet   Refills:  0       OLANZapine 15 MG tablet   Commonly known as:  ZYPREXA   This may have changed:  how much to take   Used for:  Bipolar I disorder with christiano (H)        Dose:  15 mg   Take 1 tablet (15 mg) by mouth At Bedtime   Quantity:  30 tablet   Refills:  0                Primary Care Provider Office Phone # Fax #    Dionne Srinivasan [phone]       [facility] [address] [place] [address] N  [facility] [address]        Thank you!     Thank you for choosing [facility]  for your care. Our goal is always to provide you  with excellent care. Hearing back from our patients is one way we can continue to improve our services. Please take a few minutes to complete the written survey that you may receive in the mail after your visit with us. Thank you!             Your Updated Medication List - Protect others around you: Learn how to safely use, store and throw away your medicines at www.disposemymeds.org.          This list is accurate as of: 6/13/17 10:50 AM.  Always use your most recent med list.                   Brand Name Dispense Instructions for use    DAILY MULTIVITAMIN PO      Take 1 tablet by mouth daily.       EPINEPHrine 0.3 MG/0.3ML injection    EPIPEN     Inject 0.3 mg into the muscle once as needed.       FISH OIL PO      Take 1 capsule by mouth daily.       lamoTRIgine 200 MG tablet    LaMICtal    30 tablet    Take 1 tablet (200 mg) by mouth At Bedtime       lithium 300 MG CR tablet    ESKALITH/LITHOBID    60 tablet    Take 2 tablets (600 mg) by mouth every morning       OLANZapine 15 MG tablet    ZYPREXA    30 tablet    Take 1 tablet (15 mg) by mouth At Bedtime       SUMAtriptan 100 MG tablet    IMITREX    12 tablet    Take 1 tablet by mouth at onset of headache for migraine.

## 2017-06-13 NOTE — PATIENT INSTRUCTIONS
Fasting labs.    Collect and return card for colon cancer screening.  If agreeable to colonoscopy - call for referral.    Mammogram recommended.  Preventive Health Recommendations  Female Ages 50 - 64    Yearly exam: See your health care provider every year in order to  o Review health changes.   o Discuss preventive care.    o Review your medicines if your doctor has prescribed any.      Get a Pap test every three years (unless you have an abnormal result and your provider advises testing more often).    If you get Pap tests with HPV test, you only need to test every 5 years, unless you have an abnormal result.     You do not need a Pap test if your uterus was removed (hysterectomy) and you have not had cancer.    You should be tested each year for STDs (sexually transmitted diseases) if you're at risk.     Have a mammogram every 1 to 2 years.    Have a colonoscopy at age 50, or have a yearly FIT test (stool test). These exams screen for colon cancer.      Have a cholesterol test every 5 years, or more often if advised.    Have a diabetes test (fasting glucose) every three years. If you are at risk for diabetes, you should have this test more often.     If you are at risk for osteoporosis (brittle bone disease), think about having a bone density scan (DEXA).    Shots: Get a flu shot each year. Get a tetanus shot every 10 years.    Nutrition:     Eat at least 5 servings of fruits and vegetables each day.    Eat whole-grain bread, whole-wheat pasta and brown rice instead of white grains and rice.    Talk to your provider about Calcium and Vitamin D.     Lifestyle    Exercise at least 150 minutes a week (30 minutes a day, 5 days a week). This will help you control your weight and prevent disease.    Limit alcohol to one drink per day.    No smoking.     Wear sunscreen to prevent skin cancer.     See your dentist every six months for an exam and cleaning.    See your eye doctor every 1 to 2 years.

## 2017-06-14 LAB — HCV AB SERPL QL IA: NORMAL

## 2017-06-14 ASSESSMENT — PATIENT HEALTH QUESTIONNAIRE - PHQ9: SUM OF ALL RESPONSES TO PHQ QUESTIONS 1-9: 4

## 2017-06-14 ASSESSMENT — ANXIETY QUESTIONNAIRES: GAD7 TOTAL SCORE: 2

## 2017-06-16 LAB
COPATH REPORT: NORMAL
PAP: NORMAL

## 2017-06-20 LAB
FINAL DIAGNOSIS: NORMAL
HPV HR 12 DNA CVX QL NAA+PROBE: NEGATIVE
HPV16 DNA SPEC QL NAA+PROBE: NEGATIVE
HPV18 DNA SPEC QL NAA+PROBE: NEGATIVE
SPECIMEN DESCRIPTION: NORMAL

## 2017-07-27 ENCOUNTER — TELEPHONE (OUTPATIENT)
Dept: FAMILY MEDICINE | Facility: CLINIC | Age: 62
End: 2017-07-27

## 2017-07-27 NOTE — TELEPHONE ENCOUNTER
Reason for Call:  Request for results:    Name of test or procedure: Hemoglobin, Hemoglobin A1c, Basic Metabolic Panel, Hepatitis C Screen Reflex to HCV RNA Quant and Genotype    Date of test of procedure: 06/13/17    Location of the test or procedure: [facility]    OK to leave the result message on voice mail or with a family member? YES    Phone number Patient can be reached at:  Home number on file [phone] (home)    Additional comments:  Pt calling for she recently came in for a lab apt back on 06/13/17 and would like those results faxed to Amarilis Vera fax # [phone] ph. [phone]    Call taken on 7/27/2017 at 1:14 PM by Manny Leroy

## 2017-09-18 PROCEDURE — 82274 ASSAY TEST FOR BLOOD FECAL: CPT | Performed by: FAMILY MEDICINE

## 2017-09-22 DIAGNOSIS — Z12.11 SCREEN FOR COLON CANCER: ICD-10-CM

## 2017-09-24 LAB — HEMOCCULT STL QL IA: NEGATIVE

## 2019-06-02 NOTE — TELEPHONE ENCOUNTER
ED / Discharge Outreach Protocol    Patient Contact    Attempt # 2    Was call answered?  No.  Left message on voicemail with information to call me back.  Josefina Hebert RN      
3 attempts made to contact pt, no return call.  Will route to Dr. Srinivasan for review and orders. Josefina Hebert RN    
ED / Discharge Outreach Protocol    Patient Contact    Attempt # 1    Was call answered?  No.  Left message on voicemail with information to call me back.    GREY Hauser, Clinical RN Daniela Palomino.      
ED / Discharge Outreach Protocol    Patient Contact    Attempt # 3    Was call answered?  No.  Left message on voicemail with information to call me back.Josefina Hebert RN      
Patient discharged from Inpatient.     Discharge location: [facility]  Discharge date: 1/9/17  Diagnosis: Disorganized thought Process, Bipolar ! Disorder with Sayda    Please follow up as appropriate. If no follow up required, please close encounter.        Neda PABON, Patient Care     
Patient following up with behavioural health. Ok to close encounter.   
Unknown if ever smoked

## 2019-06-13 ENCOUNTER — TELEPHONE (OUTPATIENT)
Dept: FAMILY MEDICINE | Facility: CLINIC | Age: 64
End: 2019-06-13

## 2019-06-13 NOTE — TELEPHONE ENCOUNTER
Panel Management Review   One phone call and send letter if unable to reach them or [employer] message and send letter if not read after 2 weeks (You will get a message to your inbasket)      Visit date not found    Health Maintenance Due   Topic Date Due     DEPRESSION ACTION PLAN  1955     HIV SCREENING  06/05/1972     ZOSTER IMMUNIZATION (1 of 2) 06/05/2007     PHQ-9  12/13/2017     ADVANCED DIRECTIVE PLANNING  12/17/2017     MAMMO SCREENING  02/24/2018     PREVENTIVE CARE VISIT  06/13/2018     FIT  09/18/2018        Fail List measure: BMI        Patient is due/failing the following:   BMI    Action needed:   Patient needs office visit for Preventative Visit.    Type of outreach:    Phone, left message for patient to call back.     Questions for provider review:    None                                                                                                                             Adeola Card

## 2019-11-27 ENCOUNTER — TRANSFERRED RECORDS (OUTPATIENT)
Dept: HEALTH INFORMATION MANAGEMENT | Facility: CLINIC | Age: 64
End: 2019-11-27

## 2020-10-02 ENCOUNTER — HOSPITAL ENCOUNTER (INPATIENT)
Facility: CLINIC | Age: 65
LOS: 24 days | Discharge: HOME | DRG: 885 | End: 2020-10-26
Attending: EMERGENCY MEDICINE | Admitting: PSYCHIATRY & NEUROLOGY
Payer: COMMERCIAL

## 2020-10-02 DIAGNOSIS — N30.00 ACUTE CYSTITIS WITHOUT HEMATURIA: ICD-10-CM

## 2020-10-02 DIAGNOSIS — F31.63 BIPOLAR 1 DISORDER, MIXED, SEVERE (H): ICD-10-CM

## 2020-10-02 DIAGNOSIS — Z20.828 CONTACT WITH AND (SUSPECTED) EXPOSURE TO OTHER VIRAL COMMUNICABLE DISEASES: ICD-10-CM

## 2020-10-02 LAB
ALBUMIN SERPL-MCNC: 4 G/DL (ref 3.4–5)
ALBUMIN UR-MCNC: NEGATIVE MG/DL
ALP SERPL-CCNC: 66 U/L (ref 40–150)
ALT SERPL W P-5'-P-CCNC: 63 U/L (ref 0–50)
AMPHETAMINES UR QL SCN: NEGATIVE
ANION GAP SERPL CALCULATED.3IONS-SCNC: 6 MMOL/L (ref 3–14)
APPEARANCE UR: CLEAR
AST SERPL W P-5'-P-CCNC: 35 U/L (ref 0–45)
BACTERIA #/AREA URNS HPF: ABNORMAL /HPF
BARBITURATES UR QL: NEGATIVE
BASOPHILS # BLD AUTO: 0 10E9/L (ref 0–0.2)
BASOPHILS NFR BLD AUTO: 0.3 %
BENZODIAZ UR QL: NEGATIVE
BILIRUB SERPL-MCNC: 0.3 MG/DL (ref 0.2–1.3)
BILIRUB UR QL STRIP: NEGATIVE
BUN SERPL-MCNC: 16 MG/DL (ref 7–30)
CALCIUM SERPL-MCNC: 9.3 MG/DL (ref 8.5–10.1)
CANNABINOIDS UR QL SCN: NEGATIVE
CHLORIDE SERPL-SCNC: 109 MMOL/L (ref 94–109)
CO2 SERPL-SCNC: 25 MMOL/L (ref 20–32)
COCAINE UR QL: NEGATIVE
COLOR UR AUTO: ABNORMAL
CREAT SERPL-MCNC: 0.69 MG/DL (ref 0.52–1.04)
DIFFERENTIAL METHOD BLD: ABNORMAL
EOSINOPHIL # BLD AUTO: 0 10E9/L (ref 0–0.7)
EOSINOPHIL NFR BLD AUTO: 0 %
ERYTHROCYTE [DISTWIDTH] IN BLOOD BY AUTOMATED COUNT: 13.6 % (ref 10–15)
ETHANOL UR QL SCN: NEGATIVE
GFR SERPL CREATININE-BSD FRML MDRD: >90 ML/MIN/{1.73_M2}
GLUCOSE SERPL-MCNC: 141 MG/DL (ref 70–99)
GLUCOSE UR STRIP-MCNC: 30 MG/DL
HCT VFR BLD AUTO: 41.5 % (ref 35–47)
HGB BLD-MCNC: 13.6 G/DL (ref 11.7–15.7)
HGB UR QL STRIP: NEGATIVE
IMM GRANULOCYTES # BLD: 0 10E9/L (ref 0–0.4)
IMM GRANULOCYTES NFR BLD: 0.3 %
KETONES UR STRIP-MCNC: 10 MG/DL
LEUKOCYTE ESTERASE UR QL STRIP: ABNORMAL
LYMPHOCYTES # BLD AUTO: 1.2 10E9/L (ref 0.8–5.3)
LYMPHOCYTES NFR BLD AUTO: 20.9 %
MCH RBC QN AUTO: 33.3 PG (ref 26.5–33)
MCHC RBC AUTO-ENTMCNC: 32.8 G/DL (ref 31.5–36.5)
MCV RBC AUTO: 102 FL (ref 78–100)
MONOCYTES # BLD AUTO: 0.4 10E9/L (ref 0–1.3)
MONOCYTES NFR BLD AUTO: 7.3 %
MUCOUS THREADS #/AREA URNS LPF: PRESENT /LPF
NEUTROPHILS # BLD AUTO: 4.1 10E9/L (ref 1.6–8.3)
NEUTROPHILS NFR BLD AUTO: 71.2 %
NITRATE UR QL: POSITIVE
NRBC # BLD AUTO: 0 10*3/UL
NRBC BLD AUTO-RTO: 0 /100
OPIATES UR QL SCN: NEGATIVE
PH UR STRIP: 5.5 PH (ref 5–7)
PLATELET # BLD AUTO: 229 10E9/L (ref 150–450)
POTASSIUM SERPL-SCNC: 3.5 MMOL/L (ref 3.4–5.3)
PROT SERPL-MCNC: 7.6 G/DL (ref 6.8–8.8)
RBC # BLD AUTO: 4.08 10E12/L (ref 3.8–5.2)
RBC #/AREA URNS AUTO: 1 /HPF (ref 0–2)
SARS-COV-2 RNA SPEC QL NAA+PROBE: NORMAL
SODIUM SERPL-SCNC: 140 MMOL/L (ref 133–144)
SOURCE: ABNORMAL
SP GR UR STRIP: 1.01 (ref 1–1.03)
SPECIMEN SOURCE: NORMAL
SQUAMOUS #/AREA URNS AUTO: 3 /HPF (ref 0–1)
TSH SERPL DL<=0.005 MIU/L-ACNC: 1.63 MU/L (ref 0.4–4)
UROBILINOGEN UR STRIP-MCNC: NORMAL MG/DL (ref 0–2)
WBC # BLD AUTO: 5.8 10E9/L (ref 4–11)
WBC #/AREA URNS AUTO: 8 /HPF (ref 0–5)

## 2020-10-02 PROCEDURE — 80320 DRUG SCREEN QUANTALCOHOLS: CPT | Performed by: EMERGENCY MEDICINE

## 2020-10-02 PROCEDURE — C9803 HOPD COVID-19 SPEC COLLECT: HCPCS

## 2020-10-02 PROCEDURE — 84443 ASSAY THYROID STIM HORMONE: CPT | Performed by: EMERGENCY MEDICINE

## 2020-10-02 PROCEDURE — 99285 EMERGENCY DEPT VISIT HI MDM: CPT | Mod: 25 | Performed by: EMERGENCY MEDICINE

## 2020-10-02 PROCEDURE — 87086 URINE CULTURE/COLONY COUNT: CPT | Performed by: EMERGENCY MEDICINE

## 2020-10-02 PROCEDURE — 85025 COMPLETE CBC W/AUTO DIFF WBC: CPT | Performed by: EMERGENCY MEDICINE

## 2020-10-02 PROCEDURE — 80053 COMPREHEN METABOLIC PANEL: CPT | Performed by: EMERGENCY MEDICINE

## 2020-10-02 PROCEDURE — 87088 URINE BACTERIA CULTURE: CPT | Performed by: EMERGENCY MEDICINE

## 2020-10-02 PROCEDURE — 99284 EMERGENCY DEPT VISIT MOD MDM: CPT | Performed by: EMERGENCY MEDICINE

## 2020-10-02 PROCEDURE — 250N000013 HC RX MED GY IP 250 OP 250 PS 637: Performed by: EMERGENCY MEDICINE

## 2020-10-02 PROCEDURE — 87186 SC STD MICRODIL/AGAR DIL: CPT | Performed by: EMERGENCY MEDICINE

## 2020-10-02 PROCEDURE — 81001 URINALYSIS AUTO W/SCOPE: CPT | Performed by: EMERGENCY MEDICINE

## 2020-10-02 PROCEDURE — 90791 PSYCH DIAGNOSTIC EVALUATION: CPT

## 2020-10-02 PROCEDURE — U0003 INFECTIOUS AGENT DETECTION BY NUCLEIC ACID (DNA OR RNA); SEVERE ACUTE RESPIRATORY SYNDROME CORONAVIRUS 2 (SARS-COV-2) (CORONAVIRUS DISEASE [COVID-19]), AMPLIFIED PROBE TECHNIQUE, MAKING USE OF HIGH THROUGHPUT TECHNOLOGIES AS DESCRIBED BY CMS-2020-01-R: HCPCS | Performed by: EMERGENCY MEDICINE

## 2020-10-02 PROCEDURE — 250N000013 HC RX MED GY IP 250 OP 250 PS 637: Performed by: NURSE PRACTITIONER

## 2020-10-02 PROCEDURE — 124N000003 HC R&B MH SENIOR/ADOLESCENT

## 2020-10-02 PROCEDURE — 80307 DRUG TEST PRSMV CHEM ANLYZR: CPT | Performed by: EMERGENCY MEDICINE

## 2020-10-02 RX ORDER — HYDROXYZINE HYDROCHLORIDE 25 MG/1
25 TABLET, FILM COATED ORAL EVERY 4 HOURS PRN
Status: DISCONTINUED | OUTPATIENT
Start: 2020-10-02 | End: 2020-10-26 | Stop reason: HOSPADM

## 2020-10-02 RX ORDER — POLYETHYLENE GLYCOL 3350 17 G
2 POWDER IN PACKET (EA) ORAL
Status: DISCONTINUED | OUTPATIENT
Start: 2020-10-02 | End: 2020-10-26 | Stop reason: HOSPADM

## 2020-10-02 RX ORDER — OLANZAPINE 10 MG/1
10 TABLET ORAL
Status: DISCONTINUED | OUTPATIENT
Start: 2020-10-02 | End: 2020-10-26 | Stop reason: HOSPADM

## 2020-10-02 RX ORDER — CEPHALEXIN 500 MG/1
500 CAPSULE ORAL ONCE
Status: COMPLETED | OUTPATIENT
Start: 2020-10-02 | End: 2020-10-02

## 2020-10-02 RX ORDER — LAMOTRIGINE 200 MG/1
200 TABLET ORAL AT BEDTIME
Status: DISCONTINUED | OUTPATIENT
Start: 2020-10-02 | End: 2020-10-02

## 2020-10-02 RX ORDER — ALUMINA, MAGNESIA, AND SIMETHICONE 2400; 2400; 240 MG/30ML; MG/30ML; MG/30ML
30 SUSPENSION ORAL EVERY 4 HOURS PRN
Status: DISCONTINUED | OUTPATIENT
Start: 2020-10-02 | End: 2020-10-26 | Stop reason: HOSPADM

## 2020-10-02 RX ORDER — OLANZAPINE 5 MG/1
5 TABLET ORAL AT BEDTIME
Status: DISCONTINUED | OUTPATIENT
Start: 2020-10-02 | End: 2020-10-05

## 2020-10-02 RX ORDER — ACETAMINOPHEN 325 MG/1
650 TABLET ORAL EVERY 4 HOURS PRN
Status: DISCONTINUED | OUTPATIENT
Start: 2020-10-02 | End: 2020-10-26 | Stop reason: HOSPADM

## 2020-10-02 RX ORDER — MULTIVITAMIN,THERAPEUTIC
1 TABLET ORAL DAILY
Status: DISCONTINUED | OUTPATIENT
Start: 2020-10-03 | End: 2020-10-26 | Stop reason: HOSPADM

## 2020-10-02 RX ORDER — TRAZODONE HYDROCHLORIDE 50 MG/1
50-100 TABLET, FILM COATED ORAL
Status: DISCONTINUED | OUTPATIENT
Start: 2020-10-02 | End: 2020-10-26 | Stop reason: HOSPADM

## 2020-10-02 RX ORDER — OLANZAPINE 5 MG/1
5 TABLET ORAL AT BEDTIME
Status: ON HOLD | COMMUNITY
Start: 2020-03-27 | End: 2020-10-26

## 2020-10-02 RX ORDER — LAMOTRIGINE 200 MG/1
200 TABLET ORAL DAILY
Status: DISCONTINUED | OUTPATIENT
Start: 2020-10-03 | End: 2020-10-06

## 2020-10-02 RX ORDER — BISACODYL 10 MG
10 SUPPOSITORY, RECTAL RECTAL DAILY PRN
Status: DISCONTINUED | OUTPATIENT
Start: 2020-10-02 | End: 2020-10-26 | Stop reason: HOSPADM

## 2020-10-02 RX ORDER — TRAZODONE HYDROCHLORIDE 50 MG/1
50-100 TABLET, FILM COATED ORAL
Status: ON HOLD | COMMUNITY
Start: 2020-09-17 | End: 2020-10-26

## 2020-10-02 RX ORDER — TRAZODONE HYDROCHLORIDE 50 MG/1
50 TABLET, FILM COATED ORAL
Status: DISCONTINUED | OUTPATIENT
Start: 2020-10-02 | End: 2020-10-02

## 2020-10-02 RX ORDER — OLANZAPINE 10 MG/2ML
10 INJECTION, POWDER, FOR SOLUTION INTRAMUSCULAR
Status: DISCONTINUED | OUTPATIENT
Start: 2020-10-02 | End: 2020-10-26 | Stop reason: HOSPADM

## 2020-10-02 RX ADMIN — CEPHALEXIN 500 MG: 500 CAPSULE ORAL at 19:04

## 2020-10-02 RX ADMIN — TRAZODONE HYDROCHLORIDE 50 MG: 50 TABLET ORAL at 22:55

## 2020-10-02 RX ADMIN — TRAZODONE HYDROCHLORIDE 50 MG: 50 TABLET ORAL at 23:18

## 2020-10-02 RX ADMIN — OLANZAPINE 5 MG: 5 TABLET, FILM COATED ORAL at 22:56

## 2020-10-02 ASSESSMENT — ACTIVITIES OF DAILY LIVING (ADL)
LAUNDRY: WITH SUPERVISION
ORAL_HYGIENE: INDEPENDENT
DRESS: INDEPENDENT
HYGIENE/GROOMING: INDEPENDENT

## 2020-10-02 NOTE — ED NOTES
Bed: ED16A  Expected date: 10/2/20  Expected time: 2:32 PM  Means of arrival:   Comments:  [facility] 65/Female/Depression in [place] / -Covid
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
## 2020-10-02 NOTE — ED NOTES
is Carloz.Cell phone [phone]. He states that the pt was in a virtual visit with her therapist.The therapist is Rachel Olson who is working for [facility] [phone]

## 2020-10-03 LAB
FOLATE SERPL-MCNC: 19 NG/ML
LABORATORY COMMENT REPORT: NORMAL
SARS-COV-2 RNA SPEC QL NAA+PROBE: NEGATIVE
SPECIMEN SOURCE: NORMAL
VIT B12 SERPL-MCNC: 521 PG/ML (ref 193–986)

## 2020-10-03 PROCEDURE — 36415 COLL VENOUS BLD VENIPUNCTURE: CPT | Performed by: NURSE PRACTITIONER

## 2020-10-03 PROCEDURE — 82306 VITAMIN D 25 HYDROXY: CPT | Performed by: NURSE PRACTITIONER

## 2020-10-03 PROCEDURE — 99223 1ST HOSP IP/OBS HIGH 75: CPT | Mod: AI | Performed by: PSYCHIATRY & NEUROLOGY

## 2020-10-03 PROCEDURE — 82746 ASSAY OF FOLIC ACID SERUM: CPT | Performed by: NURSE PRACTITIONER

## 2020-10-03 PROCEDURE — 250N000013 HC RX MED GY IP 250 OP 250 PS 637: Performed by: PHYSICIAN ASSISTANT

## 2020-10-03 PROCEDURE — H2032 ACTIVITY THERAPY, PER 15 MIN: HCPCS

## 2020-10-03 PROCEDURE — 99222 1ST HOSP IP/OBS MODERATE 55: CPT | Performed by: PHYSICIAN ASSISTANT

## 2020-10-03 PROCEDURE — 250N000013 HC RX MED GY IP 250 OP 250 PS 637: Performed by: PSYCHIATRY & NEUROLOGY

## 2020-10-03 PROCEDURE — 82607 VITAMIN B-12: CPT | Performed by: NURSE PRACTITIONER

## 2020-10-03 PROCEDURE — 250N000013 HC RX MED GY IP 250 OP 250 PS 637: Performed by: NURSE PRACTITIONER

## 2020-10-03 PROCEDURE — 124N000003 HC R&B MH SENIOR/ADOLESCENT

## 2020-10-03 PROCEDURE — 99207 PR CONSULT E&M CHANGED TO INITIAL LEVEL: CPT | Performed by: PHYSICIAN ASSISTANT

## 2020-10-03 RX ORDER — LACTOBACILLUS RHAMNOSUS GG 10B CELL
1 CAPSULE ORAL 2 TIMES DAILY
Status: DISCONTINUED | OUTPATIENT
Start: 2020-10-03 | End: 2020-10-26 | Stop reason: HOSPADM

## 2020-10-03 RX ORDER — SULFAMETHOXAZOLE/TRIMETHOPRIM 800-160 MG
1 TABLET ORAL 2 TIMES DAILY
Status: COMPLETED | OUTPATIENT
Start: 2020-10-03 | End: 2020-10-05

## 2020-10-03 RX ORDER — LACTOBACILLUS RHAMNOSUS GG 10B CELL
1 CAPSULE ORAL 2 TIMES DAILY
Status: DISCONTINUED | OUTPATIENT
Start: 2020-10-03 | End: 2020-10-03

## 2020-10-03 RX ADMIN — LAMOTRIGINE 200 MG: 200 TABLET ORAL at 08:13

## 2020-10-03 RX ADMIN — Medication 1 CAPSULE: at 15:19

## 2020-10-03 RX ADMIN — Medication 1 CAPSULE: at 20:35

## 2020-10-03 RX ADMIN — TRAZODONE HYDROCHLORIDE 100 MG: 50 TABLET ORAL at 20:35

## 2020-10-03 RX ADMIN — SULFAMETHOXAZOLE AND TRIMETHOPRIM 1 TABLET: 800; 160 TABLET ORAL at 13:29

## 2020-10-03 RX ADMIN — THERA TABS 1 TABLET: TAB at 08:13

## 2020-10-03 RX ADMIN — SULFAMETHOXAZOLE AND TRIMETHOPRIM 1 TABLET: 800; 160 TABLET ORAL at 20:35

## 2020-10-03 RX ADMIN — OLANZAPINE 5 MG: 5 TABLET, FILM COATED ORAL at 20:35

## 2020-10-03 ASSESSMENT — ACTIVITIES OF DAILY LIVING (ADL)
ORAL_HYGIENE: INDEPENDENT
DRESS: INDEPENDENT;STREET CLOTHES

## 2020-10-03 NOTE — PROGRESS NOTES
"ADMISSION NOTE:  65 yr old female admitted to  Geriatric Psych for christiano.  History of Bipolar.  She apparently spent $8000 today. She arrives from the [facility] in wheelchair in street clothes accompanied by  [facility] staff and security.  She is pleasant, hyper-verbal, going off on tangents.  She is cooperative with admission process.  Thought she was going to the unit she was admitted on a prior admission so was disappointed that she did not recognize anything. When told what kind of unit this was, \"I like young people, with the tatoos\" Safety check performed.  Admission packet given and questions answered.    "
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
## 2020-10-03 NOTE — PLAN OF CARE
Problem: Manic Symptoms  Goal: Manic Symptoms  Description: Signs and symptoms of listed problems will be absent or manageable.  Outcome: No Change   Pt has settled done considerably since arriving on the unit.  Called her .  She took her medication cooperatively.

## 2020-10-03 NOTE — PROGRESS NOTES
10/02/20 2116   Patient Belongings   Did you bring any home meds/supplements to the hospital?  No   Patient Belongings locker   Patient Belongings Put in Hospital Secure Location (Security or Locker, etc.) other (see comments)   Belongings Search Yes  (Gerald [place])   Clothing Search Yes  (Zenaida [place])   Second Staff Natalya [place]     Pt belongings left in locker: Purse, 2 rolls toilet paper, box of kleenex, phone , phone, hat, shoes, shawl, 2 shirts, tank top, bra, leggings.    No belongings sent to security.    A               Admission:  I am responsible for any personal items that are not sent to the safe or pharmacy.  [facility] is not responsible for loss, theft or damage of any property in my possession.    Signature:  _________________________________ Date: _______  Time: _____                                              Staff Signature:  ____________________________ Date: ________  Time: _____      2nd Staff person, if patient is unable/unwilling to sign:    Signature: ________________________________ Date: ________  Time: _____     Discharge:  [facility] has returned all of my personal belongings:    Signature: _________________________________ Date: ________  Time: _____                                          Staff Signature:  ____________________________ Date: ________  Time: _____

## 2020-10-03 NOTE — CONSULTS
Consult Date:  10/03/2020      HISTORY OF PRESENT ILLNESS:  The patient is a 65-year-old female with history of bipolar 1 disorder, admitted to station 3B for manic symptoms including pressured speech, tangential thinking and being hyperverbal.  Also, recurrently agitated.  An Internal Medicine consultation was ordered by Dr. Ley's team to assess medical problems including probable urinary tract infection.  At this time, Rosy is a very poor historian due to her acute christiano.  It is very hyperverbal, tangential and it is hard to focus on one topic.  When asked if she has any physical concerns, the patient denies.  When specifically asked if she is having any burning with urination, the patient denies but states it started when she used tissues yesterday.  Denies fever and chills.  Denies chest pain, shortness of breath.  Denies abdominal pain and nausea.  Denies bowel or bladder concerns.      PAST MEDICAL HISTORY:   1.  Bipolar 1 disorder and other psychiatric history per Dr. Ley's team.   2.  History of migraine headaches.   3.  Denies history of major medical problems including cardiopulmonary disease, hypertension and diabetes.      Past Surgical History:   Procedure Laterality Date     HC KNEE SCOPE,ABRASN ARTHROPLASTY  10/05     LIGATN/STRIP LONG OR SHORT SAPHEN           ADMISSION MEDICATIONS:  Reviewed and listed in the medication reconciliation list.      ALLERGIES:  SHELLFISH.      Social History     Socioeconomic History     Marital status:      Spouse name: Not on file     Number of children: Not on file     Years of education: Not on file     Highest education level: Not on file   Occupational History     Occupation: At Home   Social Needs     Financial resource strain: Not on file     Food insecurity     Worry: Not on file     Inability: Not on file     Transportation needs     Medical: Not on file     Non-medical: Not on file   Tobacco Use     Smoking status: Former Smoker     Smokeless  tobacco: Never Used     Tobacco comment: 1/2 cigarette once in a while for headaches   Substance and Sexual Activity     Alcohol use: Yes     Comment: very rare     Drug use: No     Sexual activity: Yes     Partners: Male     Birth control/protection: Condom   Lifestyle     Physical activity     Days per week: Not on file     Minutes per session: Not on file     Stress: Not on file   Relationships     Social connections     Talks on phone: Not on file     Gets together: Not on file     Attends Episcopal service: Not on file     Active member of club or organization: Not on file     Attends meetings of clubs or organizations: Not on file     Relationship status: Not on file     Intimate partner violence     Fear of current or ex partner: Not on file     Emotionally abused: Not on file     Physically abused: Not on file     Forced sexual activity: Not on file   Other Topics Concern      Service No     Blood Transfusions No     Caffeine Concern No     Occupational Exposure No     Hobby Hazards No     Sleep Concern Yes     Stress Concern Yes     Weight Concern Yes     Special Diet Yes     Comment: fraction nibble     Back Care No     Exercise Yes     Comment: 5 x per week     Bike Helmet Yes     Seat Belt Yes     Comment: 100%     Self-Exams No     Parent/sibling w/ CABG, MI or angioplasty before 65F 55M? No   Social History Narrative     with 6 children                 FAMILY HISTORY:  Reviewed and noncontributory.      REVIEW OF SYSTEMS:  Ten-point review of systems negative except as stated above in history of present illness.      PHYSICAL EXAMINATION:   GENERAL:  Nontoxic appearing lady in no acute distress.   VITAL SIGNS:  Stable, temperature afebrile, pulse in the 90s, blood pressure 150s/70s.   HEENT:  Negative.   NECK:  Supple.  No cervical lymphadenopathy or thyromegaly.   LUNGS:  Clear.   CARDIOVASCULAR:  Regular rate and rhythm.   ABDOMEN:  Soft, nontender.   EXTREMITIES:  No edema.   SKIN:   No rash on exposed areas.   NEUROLOGIC:  She is awake, alert and oriented x 3.  Motor strength is symmetric.  She is not tremulous.  Gait unremarkable.      LABORATORY DATA:  Urinalysis positive for nitrites, moderate amount of leukocyte esterase, 8 white blood cells, otherwise negative.  Urine culture greater than 100,000 colonies of E. coli.  .  ALT 63.  Otherwise, rest of CBC, comprehensive metabolic panel, TSH and vitamin B12 level normal.  COVID testing negative.  Urine drug screen negative.      ASSESSMENT:   1.  Acute christiano in history of patient with bipolar disorder per Dr. Ley's team.   2.  Acute cystitis, uncomplicated, based on urine culture greater than 100,000 colonies of Escherichia coli.   3.  Mildly elevated ALT of unclear etiology or significance at this time.   4.  Otherwise, overall apparent normally good physical health.      PLAN:  Repeat hepatic panel in a few days.  Empirically start patient on Bactrim-DS 1 tab b.i.d. x 3 days.  Follow-up on urine culture susceptibilities to ensure Bactrim is sensitive.  No further medical intervention indicated at this time.  The patient is medically stable, and I will to followup and see her again during her stay for any intercurrent medical issues.         AUGUSTA PALACIO PA-C             D: 10/03/2020   T: 10/03/2020   MT:       Name:     ROBER BLOOD   MRN:      [id number]        Account:       [id number]   :      1955           Consult Date:  10/03/2020      Document: [id number]       cc: Ciro Ley MD

## 2020-10-03 NOTE — PROGRESS NOTES
"Pt has been pleasant and cooperative with writer. Pt does voice frustration with being hospitalized and feels that her  is the reason she is here. Pt speech is rambling and tangential. Pt denies mental health symptoms with the exception of stating that she feels that she has \"ADHD plus a H\". Pt is interactive with peers.   Pt reports that she has been taking her medications at home with food.    Writer received a call from pharmacist to review UA results. They were concerned as antibiotic was not continued that had been started in the ED. Jonathan MEDINA was paged and updated. He will see patient later today.  "

## 2020-10-03 NOTE — PROGRESS NOTES
"SPIRITUAL HEALTH SERVICES: Tele-Encounter  Patient Location [address]  Spoke with: Patient      Referral Source:  Request     patient was appropriately screened for telechaplaincy support with bedside nurse prior to visit     DATA:  I had a tele-visit with Pt Rosy.  She shared that she's a, \"devout [employer]ist\" and that she, \"loves music.\"  She says she's upset that she's here, but, \"will make the best of it.\"       PLAN:  [facility]    ______________________________    Type of service:  Telephone Visit     has received verbal consent for a TelephoneVisit from the patient? Yes    Distance Provider Location: designated [place] office or home office (secure setting)    Mode of Communication: telephone (via Pixelated phone or Fi.tt tele-call-number ([phone]))      "

## 2020-10-03 NOTE — PHARMACY-ADMISSION MEDICATION HISTORY
Admission Medication History Completed by Pharmacy    See [facility] Admission Navigator for allergy information, preferred outpatient pharmacy, prior to admission medications and immunization status.     Medication History Sources:     Patient, Surescripts    Changes made to PTA medication list (reason):    Added: None    Deleted:   o Epipen 0.3 mg/0.3 ml: Inject 0.3 mg into the muscle once as needed (per patient)  o Lithium 300 mg CR: Take 2 tabs PO QAM (per patient)  o Sumatriptan 100 mg: Take 1 tab PO at onset of headache migraine (per patient)    Changed:   o Olanzapine 15 mg: take 1 tab PO at bedtime --> Olanzapine 5 mg: Take 5 mg PO QHS    Additional Information:    Olanzapine: The patient reports taking 2.5 mg at bedtime, but the most recent prescription is for 5 mg daily per Surescripts.    Per Surescripts, the patient has not filled her lamotrigine or olanzapine since 3/27/20 for a 90 day supply, but reports taking both of these medications within the past week.    Prior to Admission medications    Medication Sig Last Dose Taking? Auth Provider   lamoTRIgine (LAMICTAL) 200 MG tablet Take 1 tablet (200 mg) by mouth At Bedtime Past Week Yes AndStoney patrick MD   Multiple Vitamin (DAILY MULTIVITAMIN PO) Take 1 tablet by mouth daily. 10/1/2020 Yes Reported, Patient   OLANZapine (ZYPREXA) 5 MG tablet Take 5 mg by mouth At Bedtime  Past Week Yes Reported, Patient   Omega-3 Fatty Acids (FISH OIL PO) Take 1 capsule by mouth daily. 10/1/2020 Yes Reported, Patient   traZODone (DESYREL) 50 MG tablet Take  mg by mouth nightly as needed  Past Week Yes Reported, Patient       Date completed: 10/02/20    Medication history completed by: Karen Jones, Pharmacist Intern

## 2020-10-03 NOTE — ED NOTES
ED to Behavioral Floor Handoff    SITUATION  Rosy Jean is a 65 year old female who speaks English and lives in a home with a spouse The patient arrived in the ED by ambulance from home with a complaint of Manic Behavior  .The patient's current symptoms started/worsened 1 day(s) ago and during this time the symptoms have increased.   In the ED, pt was diagnosed with   Final diagnoses:   Bipolar 1 disorder, mixed, severe (H)   Acute cystitis without hematuria        Initial vitals were: BP: (!) 160/82  Pulse: 107  Temp: 97.2  F (36.2  C)  Resp: 16  Weight: 85.3 kg (188 lb)  SpO2: 98 %   --------  Is the patient diabetic? No   If yes, last blood glucose? --     If yes, was this treated in the ED? --  --------  Is the patient inebriated (ETOH) No or Impaired on other substances? No  MSSA done? N/A  Last MSSA score: --    Were withdrawal symptoms treated? N/A  Does the patient have a seizure history? No. If yes, date of most recent seizure--  --------  Is the patient patient experiencing suicidal ideation? denies current or recent suicidal ideation     Homicidal ideation? denies current or recent homicidal ideation or behaviors.    Self-injurious behavior/urges? denies current or recent self injurious behavior or ideation.  ------  Was pt aggressive in the ED No  Was a code called No  Is the pt now cooperative? Yes  -------  Meds given in ED:   Medications   cephALEXin (KEFLEX) capsule 500 mg (500 mg Oral Given 10/2/20 1904)      Family present during ED course? No  Family currently present? No    BACKGROUND  Does the patient have a cognitive impairment or developmental disability? No  Allergies:   Allergies   Allergen Reactions     Shellfish Allergy Anaphylaxis   .   Social demographics are   Social History     Socioeconomic History     Marital status:      Spouse name: Not on file     Number of children: Not on file     Years of education: Not on file     Highest education level: Not on file    Occupational History     Occupation: At Home   Social Needs     Financial resource strain: Not on file     Food insecurity     Worry: Not on file     Inability: Not on file     Transportation needs     Medical: Not on file     Non-medical: Not on file   Tobacco Use     Smoking status: Former Smoker     Smokeless tobacco: Never Used     Tobacco comment: 1/2 cigarette once in a while for headaches   Substance and Sexual Activity     Alcohol use: Yes     Comment: very rare     Drug use: No     Sexual activity: Yes     Partners: Male     Birth control/protection: Condom   Lifestyle     Physical activity     Days per week: Not on file     Minutes per session: Not on file     Stress: Not on file   Relationships     Social connections     Talks on phone: Not on file     Gets together: Not on file     Attends Rastafari service: Not on file     Active member of club or organization: Not on file     Attends meetings of clubs or organizations: Not on file     Relationship status: Not on file     Intimate partner violence     Fear of current or ex partner: Not on file     Emotionally abused: Not on file     Physically abused: Not on file     Forced sexual activity: Not on file   Other Topics Concern      Service No     Blood Transfusions No     Caffeine Concern No     Occupational Exposure No     Hobby Hazards No     Sleep Concern Yes     Stress Concern Yes     Weight Concern Yes     Special Diet Yes     Comment: fraction nibble     Back Care No     Exercise Yes     Comment: 5 x per week     Bike Helmet Yes     Seat Belt Yes     Comment: 100%     Self-Exams No     Parent/sibling w/ CABG, MI or angioplasty before 65F 55M? No   Social History Narrative     with 6 children                ASSESSMENT  Labs results   Labs Ordered and Resulted from Time of ED Arrival Up to the Time of Departure from the ED   ROUTINE UA WITH MICROSCOPIC REFLEX TO CULTURE - Abnormal; Notable for the following components:       Result  Value    Glucose Urine 30 (*)     Ketones Urine 10 (*)     Nitrite Urine Positive (*)     Leukocyte Esterase Urine Moderate (*)     WBC Urine 8 (*)     Bacteria Urine Many (*)     Squamous Epithelial /HPF Urine 3 (*)     Mucous Urine Present (*)     All other components within normal limits   DRUG ABUSE SCREEN 6 CHEM DEP URINE ([facility])   CBC WITH PLATELETS DIFFERENTIAL   TSH WITH FREE T4 REFLEX   COMPREHENSIVE METABOLIC PANEL   COVID-19 VIRUS (CORONAVIRUS) BY PCR   URINE CULTURE AEROBIC BACTERIAL      Imaging Studies: No results found for this or any previous visit (from the past 24 hour(s)).   Most recent vital signs BP (!) 160/82   Pulse 107   Temp 97.2  F (36.2  C) (Oral)   Resp 16   Wt 85.3 kg (188 lb)   LMP 12/07/2007   SpO2 98%   BMI 29.44 kg/m     Abnormal labs/tests/findings requiring intervention:---   Pain control: pt had none  Nausea control: pt had none    RECOMMENDATION  Are any infection precautions needed (MRSA, VRE, etc.)? No If yes, what infection? --  ---  Does the patient have mobility issues? independently. If yes, what device does the pt use? ---  ---  Is patient on 72 hour hold or commitment? Yes If on 72 hour hold, have hold and rights been given to patient? Yes  Are admitting orders written if after 10 p.m. ?N/A  Tasks needing to be completed:---     Audrey Tran RN   [facility]-- [phone] [facility]

## 2020-10-03 NOTE — H&P
Telemedicine Visit: The patient's condition can be safely assessed and treated via synchronous audio and visual telemedicine encounter.   Start Time: 8 .34  Stop Time: 8.54  Reason for Telemedicine Visit: Covid-19   Originating Site (Patient Location): Station 3B  Distant Site (Provider Location): Provider Remote Setting   Consent: The patient/guardian has verbally consented to: the potential risks and benefits of telemedicine (video visit) versus in person care; bill my insurance or make self-payment for services provided; and responsibility for payment of non-covered services.   Mode of Communication: Video Conference via polycom  As the provider I attest to compliance with applicable laws and regulations related to telemedicine.       The patient/guardian has been notified of the following:   This telemedicine visit is conducted live between you and your clinician. We have found that certain health care needs can be provided without the need for a physical exam. This service lets us provide the care you need with a telemedicine conversation.    Blood pressure (!) 156/79, pulse 96, temperature 97.7  F (36.5  C), temperature source Skin, resp. rate 16, weight 85.3 kg (188 lb), last menstrual period 12/07/2007, SpO2 98 %.  A 10-point review of systems is reviewed and is negative except for psychiatric symptoms above.       Allergies reviewed  .   I HAVE REVIEWED LABS WITH PT AND TALKED ABOUT RESULTS WITH PT  I HAVE REVIEWED AND SUMMARIZED OLD RECORDS including his medication reconcilation of his home medications  and PDMP   I HAVE SPOKEN WITH RN ABOUT MEDICATIONS AND withdrawl SCORES  I HAVE SPOKEN WITH CM ABOUT PTS TREATMETN OPTIONS
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
## 2020-10-04 PROCEDURE — H2032 ACTIVITY THERAPY, PER 15 MIN: HCPCS

## 2020-10-04 PROCEDURE — 250N000013 HC RX MED GY IP 250 OP 250 PS 637: Performed by: NURSE PRACTITIONER

## 2020-10-04 PROCEDURE — 250N000013 HC RX MED GY IP 250 OP 250 PS 637: Performed by: PSYCHIATRY & NEUROLOGY

## 2020-10-04 PROCEDURE — 124N000003 HC R&B MH SENIOR/ADOLESCENT

## 2020-10-04 PROCEDURE — 250N000013 HC RX MED GY IP 250 OP 250 PS 637: Performed by: PHYSICIAN ASSISTANT

## 2020-10-04 RX ADMIN — LAMOTRIGINE 200 MG: 200 TABLET ORAL at 08:18

## 2020-10-04 RX ADMIN — TRAZODONE HYDROCHLORIDE 50 MG: 50 TABLET ORAL at 21:41

## 2020-10-04 RX ADMIN — SULFAMETHOXAZOLE AND TRIMETHOPRIM 1 TABLET: 800; 160 TABLET ORAL at 21:41

## 2020-10-04 RX ADMIN — Medication 1 CAPSULE: at 21:41

## 2020-10-04 RX ADMIN — OLANZAPINE 5 MG: 5 TABLET, FILM COATED ORAL at 21:41

## 2020-10-04 RX ADMIN — SULFAMETHOXAZOLE AND TRIMETHOPRIM 1 TABLET: 800; 160 TABLET ORAL at 08:18

## 2020-10-04 RX ADMIN — THERA TABS 1 TABLET: TAB at 08:18

## 2020-10-04 RX ADMIN — Medication 1 CAPSULE: at 09:56

## 2020-10-04 ASSESSMENT — ACTIVITIES OF DAILY LIVING (ADL)
HYGIENE/GROOMING: INDEPENDENT
HYGIENE/GROOMING: INDEPENDENT
ORAL_HYGIENE: INDEPENDENT
DRESS: INDEPENDENT;STREET CLOTHES
ORAL_HYGIENE: INDEPENDENT
DRESS: INDEPENDENT

## 2020-10-04 ASSESSMENT — ENCOUNTER SYMPTOMS
DECREASED CONCENTRATION: 1
SLEEP DISTURBANCE: 1
HYPERACTIVE: 1
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
## 2020-10-04 NOTE — PROGRESS NOTES
Spent  2 hours in the lounge at the start of the shift and upon waking up.  Speech rambling and hyper verbal.  Disorganized and unable to stay focused on a task, writing on her journal, reading, requesting for drinks/food and constantly standing/ moving around. Singing and whistling .Complaining about hospital food.  Tearful /dramatic upon waking up.  Showered independently  Overall pleasant and redirectable  Slept for 4 hours

## 2020-10-04 NOTE — PLAN OF CARE
"  Problem: Manic Symptoms  Goal: Manic Symptoms  Description: Signs and symptoms of listed problems will be absent or manageable.  Outcome: No Change  Goal: Social and Therapeutic (Manic Symptoms)  Description: Signs and symptoms of listed problems will be absent or manageable.  Outcome: No Change    Pt has been in her room for the majority of the shift talking on the phone.  \"I have so many people to call let them know I'm OK\"  She is hyper-verbal, tangential, rambling, going off on tangents, laughing loudly while throwing her head back in a roar.  When she's out on the unit, she has poor boundaries talking to anyone about what ever's on her mind at the moment.   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  I'm an ER doctor.  She left me 52 minutes of the most nasty and vile voicemails.  She is calling the kids and saying the same thing.  One of them has blocked her.\"  She stopped a nurse in the hallway and told her \"I just filled up my  voicemail and in 4 or 5 hrs when he erases all of them, I'll fill it up again.  She volunteered to this writer \"I've been leaving my  messages, all sweet, kindness and thoughtful.\"  \"I've been  doing everything I'm suppose to journaling, going to classes, taking notes.  When will I get out of here\"     "

## 2020-10-04 NOTE — PROGRESS NOTES
"10/03/20 1800    Art Therapy   Type of Intervention structured groups   Response Participated with encouragement/ redirection   Hours 1   Treatment Detail    (Art Therapy) Mindfulness labyrinth / desires/ needs         Outcome- Pt was manic and hyper verbal. Her ideas were tangential about a butterfly garden, flowers, kids, music... At first she with agitation when writer was doing the group check in verbally said \" I cant do the talking its too much, Ill come back for the art.\" writer explained it was a check in leading to the art and then she settled in, remaining quite hyper verbal. She made her labyrinth by drawing symbols form all of the many coloring sheets she had around her. When she was engaged with the art, she settled down and calmed.       "

## 2020-10-05 LAB — DEPRECATED CALCIDIOL+CALCIFEROL SERPL-MC: 37 UG/L (ref 20–75)

## 2020-10-05 PROCEDURE — 250N000013 HC RX MED GY IP 250 OP 250 PS 637: Performed by: PHYSICIAN ASSISTANT

## 2020-10-05 PROCEDURE — 124N000003 HC R&B MH SENIOR/ADOLESCENT

## 2020-10-05 PROCEDURE — 99233 SBSQ HOSP IP/OBS HIGH 50: CPT | Performed by: NURSE PRACTITIONER

## 2020-10-05 PROCEDURE — H2032 ACTIVITY THERAPY, PER 15 MIN: HCPCS

## 2020-10-05 PROCEDURE — 250N000013 HC RX MED GY IP 250 OP 250 PS 637: Performed by: PSYCHIATRY & NEUROLOGY

## 2020-10-05 PROCEDURE — G0177 OPPS/PHP; TRAIN & EDUC SERV: HCPCS

## 2020-10-05 PROCEDURE — 250N000013 HC RX MED GY IP 250 OP 250 PS 637: Performed by: NURSE PRACTITIONER

## 2020-10-05 RX ORDER — OLANZAPINE 10 MG/1
10 TABLET ORAL AT BEDTIME
Status: DISCONTINUED | OUTPATIENT
Start: 2020-10-05 | End: 2020-10-12

## 2020-10-05 RX ADMIN — OLANZAPINE 10 MG: 10 TABLET, FILM COATED ORAL at 21:37

## 2020-10-05 RX ADMIN — THERA TABS 1 TABLET: TAB at 08:04

## 2020-10-05 RX ADMIN — LAMOTRIGINE 200 MG: 200 TABLET ORAL at 08:04

## 2020-10-05 RX ADMIN — Medication 1 CAPSULE: at 21:36

## 2020-10-05 RX ADMIN — SULFAMETHOXAZOLE AND TRIMETHOPRIM 1 TABLET: 800; 160 TABLET ORAL at 21:36

## 2020-10-05 RX ADMIN — TRAZODONE HYDROCHLORIDE 50 MG: 50 TABLET ORAL at 21:36

## 2020-10-05 RX ADMIN — SULFAMETHOXAZOLE AND TRIMETHOPRIM 1 TABLET: 800; 160 TABLET ORAL at 08:04

## 2020-10-05 RX ADMIN — Medication 1 CAPSULE: at 10:40

## 2020-10-05 ASSESSMENT — ACTIVITIES OF DAILY LIVING (ADL)
ORAL_HYGIENE: INDEPENDENT
HYGIENE/GROOMING: SHOWER;INDEPENDENT
DRESS: STREET CLOTHES
HYGIENE/GROOMING: INDEPENDENT
DRESS: INDEPENDENT
DRESS: INDEPENDENT
ORAL_HYGIENE: INDEPENDENT
ORAL_HYGIENE: INDEPENDENT
HYGIENE/GROOMING: INDEPENDENT
LAUNDRY: WITH SUPERVISION

## 2020-10-05 NOTE — PLAN OF CARE
BEHAVIORAL TEAM DISCUSSION    Participants: Margarette Elizabeth, DESTINEE, Roberts Chapel, BETTIE Wood, Sowmya Bethea, jimmy Cummins, DESTINEE, Roberts Chapel  Progress: Minimal  Anticipated length of stay: TBD  Continued Stay Criteria/Rationale: Pt needs stabilization and medication management.  Medical/Physical: Cystitis  Precautions:   Behavioral Orders   Procedures     Code 1 - Restrict to Unit     Routine Programming     As clinically indicated     Status 15     Every 15 minutes.     Plan: recommended    Rationale for change in precautions or plan: No changes

## 2020-10-05 NOTE — PROGRESS NOTES
Pt has been awake since 0215,spent most of the time in the lounge with all her food, magazines, journals and pillowcase laid on the table.  Disorganized, hyperverbal and was moving from one table to another.  Pt got mad when told by staff not to have opened food packages in her room, There were various food and packages lined on her window.  Declined to take any medications.  Requested for the weighted shoulder wrap  Showered at 0500 , claimed that a warn shower might help her sleep.
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
## 2020-10-05 NOTE — PROGRESS NOTES
" 10/04/20 1800   Art Therapy   Type of Intervention structured groups   Response participates with encouragement/ redirection   Hours 1   Treatment Detail    Grounding skills art         Outcome- Pt cooperative and engaged.  She is hyper verbal and has a flight of ideas. She talks in stream of consciousness such as her joys are \" butterflies, flowers, puppies, kids , music, madiha and that she is on the verge of something big.\" She also was saying some racially insensitive comments in a story to writer only, writer redirected.  She made two projects, she was focused and engaged on small details in her projects. The art was making loose associations and symbols from her multiple coloring sheets and translating those symbols to her art piece.     "
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
## 2020-10-05 NOTE — PROGRESS NOTES
10/05/20 1600   Music Therapy   Type of Participation Music therapy group   Response Participates with cues/redirection   Hours 0.5   Rosy entered group late and in dramatic fashion, lifting her arms in the air and declaring her presence in group.  Participated in Music Therapy intervention of Music [facility] today.  Goals of session were focusing, memory recall, positive distraction, emotional containment and social cohesion.  Rosy was not unsafe, but she was disinhibited and impulsive, grabbing writer's bingo calling card, crawling on the floor, etc.  She stayed after group for a few minutes to talk with writer about how she is feeling overwhelmed.  She spoke tangentially. Her affect was very bright.  She was redirectable.

## 2020-10-06 LAB
BACTERIA SPEC CULT: ABNORMAL
BACTERIA SPEC CULT: ABNORMAL
SPECIMEN SOURCE: ABNORMAL

## 2020-10-06 PROCEDURE — 250N000013 HC RX MED GY IP 250 OP 250 PS 637: Performed by: NURSE PRACTITIONER

## 2020-10-06 PROCEDURE — 99233 SBSQ HOSP IP/OBS HIGH 50: CPT | Performed by: NURSE PRACTITIONER

## 2020-10-06 PROCEDURE — G0177 OPPS/PHP; TRAIN & EDUC SERV: HCPCS

## 2020-10-06 PROCEDURE — 250N000013 HC RX MED GY IP 250 OP 250 PS 637: Performed by: PSYCHIATRY & NEUROLOGY

## 2020-10-06 PROCEDURE — H2032 ACTIVITY THERAPY, PER 15 MIN: HCPCS

## 2020-10-06 PROCEDURE — 250N000013 HC RX MED GY IP 250 OP 250 PS 637: Performed by: PHYSICIAN ASSISTANT

## 2020-10-06 PROCEDURE — 124N000003 HC R&B MH SENIOR/ADOLESCENT

## 2020-10-06 RX ORDER — LAMOTRIGINE 25 MG/1
50 TABLET ORAL DAILY
Status: DISCONTINUED | OUTPATIENT
Start: 2020-10-07 | End: 2020-10-09

## 2020-10-06 RX ORDER — LANOLIN ALCOHOL/MO/W.PET/CERES
6 CREAM (GRAM) TOPICAL
Status: DISCONTINUED | OUTPATIENT
Start: 2020-10-06 | End: 2020-10-19

## 2020-10-06 RX ADMIN — THERA TABS 1 TABLET: TAB at 08:06

## 2020-10-06 RX ADMIN — Medication 1 CAPSULE: at 08:06

## 2020-10-06 RX ADMIN — Medication 1 CAPSULE: at 20:58

## 2020-10-06 RX ADMIN — OLANZAPINE 10 MG: 10 TABLET, FILM COATED ORAL at 20:57

## 2020-10-06 RX ADMIN — LAMOTRIGINE 200 MG: 200 TABLET ORAL at 08:06

## 2020-10-06 RX ADMIN — DIVALPROEX SODIUM 750 MG: 250 TABLET, FILM COATED, EXTENDED RELEASE ORAL at 20:57

## 2020-10-06 RX ADMIN — MELATONIN TAB 3 MG 6 MG: 3 TAB at 20:57

## 2020-10-06 RX ADMIN — TRAZODONE HYDROCHLORIDE 50 MG: 50 TABLET ORAL at 20:58

## 2020-10-06 ASSESSMENT — ACTIVITIES OF DAILY LIVING (ADL)
DRESS: INDEPENDENT
HYGIENE/GROOMING: INDEPENDENT
ORAL_HYGIENE: INDEPENDENT

## 2020-10-06 NOTE — PROGRESS NOTES
10/05/20 2200   Therapeutic Recreation   Type of Intervention structured groups   Activity exercise   Response Other (describe)     Pt attended the Therapeutic Recreation group with a focus on leisure participation, relaxation, and exercise. . Pt was sociable during the first part of the group, but was very tangential with conversation. Pt often would get off subject and start talking about death of pets and people, when asked about recreational activities she enjoyed during Autumn. Pt asked several times to turn the lights lower, which this writer turned down, but then patient left the group abruptly. Pt rejoined later with a shirt or some fabric wrapped around her head covering her eyes somewhat. Pt did not follow along to the guided exercises, but wrote in a notebook for the remainder of the time.  Pt was encouraged to use positive imagery with the deep breathing and stretching to foster relaxation, improves focus, and reduce stress.
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
## 2020-10-06 NOTE — PROGRESS NOTES
Pt continues to appear disorganized and distractible. Speech pressured, tangential, rapid. Perseverative on events of last evening when she was redirected. Requesting melatonin for sleep, which she states she takes at home. Provider contacted; telephone order for melatonin 6 mg po PRN at  received and placed. Pt informed. Poor insight. Denies symptoms. Denies SI/SIB. Med-compliant. Verbalizes agreement with provider's plan to start depakote tonight and to taper off lamictal.

## 2020-10-06 NOTE — PROGRESS NOTES
Writer received a call from pt's  Carloz ([phone]) who called for an update on pt. Writer informed him that there was no MESERET for him and he was understanding but wished to provide information.  states that pt continues to leave him numerous vile voicemails calling him names and stating her should be in retirement.  states that this is normal behavior for her when she is manic and he understands that this is her illness and not her. He expressed concern for her to be discharged at this time as he feels that this would not be safe for her or him. He expressed that pt has been increasingly agitated and not behaving like herself for example spending a get deal of money and dressing oddly.  is interesting in a family meeting if possible with the team.

## 2020-10-06 NOTE — PROGRESS NOTES
Pt requested to take a shower around 2130. Due to pts manic behavior throughout shift and disorgnaization, staff decided it would be best to wait until morning. Pt was not pleased with decision and became upset with writer and other staff. Pt was redirected by nurse and was given bedtime meds and was able to calm down.

## 2020-10-06 NOTE — PROGRESS NOTES
"Behavioral Health  Note    Behavioral Health  Spirituality Group Note    [address] [place]    Name: Rosy Jean YOB: 1955   MRN: [id number] Age: 65 year old      Patient attended -led group, which included discussion of spirituality, coping with illness and building resilience.    Patient attended group for 1 hrs.    The patient actively participated in group discussion and patient demonstrated an appreciation of topic's application for their personal circumstances. Rosy was very talkative in group and was very excited to share.  She was receptive to redirection, and when I'd do so she'd say, \"I'm getting there.\" She discussed getting her garden in order as a place of actively hoping.      Topic: Active and Passive Hope      Jessica Holguin  Chaplain Resident  Pager: [phone]    "

## 2020-10-06 NOTE — PROGRESS NOTES
"Pt spent the majority of the shift in the milieu socializing with patients and staff. Pt continues to be tangential when in conversation and would present with flight of ideas along with rambling on and on about various topics. Pt could not focus on the topic at hand even when writer tried to bring back up the same topic. Pt stated that she was brought here because of her \"itchy hands\". Pt doesn't accept that she has symptoms of christiano and stated that her  told her to accept it. Pt became sarcastic about having christiano and minimized it by laughing it off. Pt denied SI/SIB/HI or hallucinations. Pt was otherwise pleasant and cooperative.  "

## 2020-10-06 NOTE — PLAN OF CARE
"Problem: OT General Care Plan  Goal: OT Goal 1  Description: Will attend OT groups improve concentration and motivation by engaging in more challenging and success oriented options while also improving insight with comments/answers made about mental health needs    Pt attended 2 out of 2 OT groups offered. Pt actively participated in occupational therapy clinic. Pt was able to ask for assistance as needed, and returned to a creative expression task with assistance in task set-up. Attentive to task for about x5 minute intervals, and appeared easily distracted. Social with peers and writer throughout. Pt attended a structured occupational therapy group involving a self-reflecting, group leisure task. She declined to actively participate in the task, and instead requested to join in the group conversation intermittently. She continues to be hyperverbal and tangential in conversation, and would often finish her thoughts with \"what did the question ask?\" She tends to speak negatively about her . Affect appeared labile. Will continue to assess.     "

## 2020-10-06 NOTE — PROGRESS NOTES
Pt only slept for 3.5 hours, Came out of her room at 0030, appeared tired and sleepy but stayed in the lounge to have tea and apple sauce.  Wanted hot cocoa, this is the 3 rd night that pt has been asking for hot cocoa and questioned why the floor does not have it. Questioning unit rules and became argumentative. Upset that she was not allowed to shower last night, Pt was reminded that staff has been allowing her to shower at 0600 for the past 2 days.  Apologized for the behavior.  Continued to be disorganized, hyperverbal with poor boundaries. Constantly reminded by staff to step behind the desk or stay 6 feet away from staff when talking and to wear her mask.  Showered at 0640

## 2020-10-06 NOTE — PROGRESS NOTES
Pt demonstrated pleasant and scattered behaviors during dance/movement therapy (DMT) session.  She accepted nonverbal interventions that were organizing and grounding.  She stated this was helpful for her.  She was able to remain vitalized and follow rhythimic and structured expressions that allowed for therapeutic empathy.  She was hyperverbal but expressed some insight about this symptom.         10/06/20 1115   Dance Movement Therapy   Type of Intervention structured groups   Response participates with cues/redirection   Hours 1

## 2020-10-07 PROCEDURE — 250N000013 HC RX MED GY IP 250 OP 250 PS 637: Performed by: PHYSICIAN ASSISTANT

## 2020-10-07 PROCEDURE — 99233 SBSQ HOSP IP/OBS HIGH 50: CPT | Performed by: NURSE PRACTITIONER

## 2020-10-07 PROCEDURE — 250N000013 HC RX MED GY IP 250 OP 250 PS 637: Performed by: NURSE PRACTITIONER

## 2020-10-07 PROCEDURE — 124N000003 HC R&B MH SENIOR/ADOLESCENT

## 2020-10-07 PROCEDURE — G0177 OPPS/PHP; TRAIN & EDUC SERV: HCPCS

## 2020-10-07 PROCEDURE — H2032 ACTIVITY THERAPY, PER 15 MIN: HCPCS

## 2020-10-07 RX ADMIN — Medication 1 CAPSULE: at 08:05

## 2020-10-07 RX ADMIN — MELATONIN TAB 3 MG 6 MG: 3 TAB at 21:42

## 2020-10-07 RX ADMIN — LAMOTRIGINE 50 MG: 25 TABLET ORAL at 08:05

## 2020-10-07 RX ADMIN — DIVALPROEX SODIUM 750 MG: 250 TABLET, FILM COATED, EXTENDED RELEASE ORAL at 21:41

## 2020-10-07 RX ADMIN — TRAZODONE HYDROCHLORIDE 50 MG: 50 TABLET ORAL at 21:42

## 2020-10-07 RX ADMIN — THERA TABS 1 TABLET: TAB at 08:05

## 2020-10-07 RX ADMIN — Medication 1 CAPSULE: at 21:41

## 2020-10-07 RX ADMIN — OLANZAPINE 10 MG: 10 TABLET, FILM COATED ORAL at 21:42

## 2020-10-07 ASSESSMENT — ACTIVITIES OF DAILY LIVING (ADL)
ORAL_HYGIENE: INDEPENDENT
DRESS: INDEPENDENT
HYGIENE/GROOMING: INDEPENDENT

## 2020-10-07 NOTE — PLAN OF CARE
Problem: OT General Care Plan  Goal: OT Goal 1  Description: Will attend OT groups improve concentration and motivation by engaging in more challenging and success oriented options while also improving insight with comments/answers made about mental health needs    Pt attended 2 out of 3 OT groups offered. Pt actively participated in occupational therapy clinic. Pt was able to ask for assistance as needed, and returned to a creative expression task with assistance in task set-up. Pt demonstrated improved focus today, and was able to stay on task for longer intervals in comparison to earlier in the week. Disorganized in managing her task materials, though she was still able to make a plan and follow through with it. Social with peers and writer throughout. She continues to be hyperverbal and tangential, though less so than yesterday. Affect appeared labile, though less extreme fluctuations in comparison to yesterday, and she was calmer overall. She continues to do somewhat odd exercises/stretches during group at times, though refocused on her task when she was done. Significantly less demanding in making requests in comparison to groups earlier in the week. Will continue to assess.

## 2020-10-07 NOTE — PROGRESS NOTES
"  Pt continues to have pressured, rapid speech, although less so than upon admission. Continues to perseverate on her , stating \"he told me on the phone that he cares about me and that no one loves me more than he does\" and that she then hung up on him. States she might call him back one more time this evening, and \"I'll give him one more chance to not interrupt me.\" BP elevated: 157/90. Denied anxiety and declined hydroxyzine PRN. States she is not feeling well in general. Reports feeling \"shaky.\" Appears steady and denies dizziness. Impatient and distractible at times, but overall marginally less disorganized and intrusive. Attended group. Med-compliant.  "
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
## 2020-10-07 NOTE — PLAN OF CARE
"  Problem: Manic Symptoms  Intervention: Manic Symptoms  Flowsheets (Taken 10/7/2020 0826)  Manic Symptoms:    maintain safety precautions    decrease environmental stimulation    redirection of intrusive behaviors    simple, clear language     Problem: Manic Symptoms  Goal: Social and Therapeutic (Manic Symptoms)  Description: Signs and symptoms of listed problems will be absent or manageable.  Outcome: No Change     Problem: Manic Symptoms  Goal: Manic Symptoms  Description: Signs and symptoms of listed problems will be absent or manageable.  Outcome: No Change  Flowsheets (Taken 10/7/2020 0826)  Manic Symptoms Assessed: all  Manic Symptoms Present:    mood    sleep    speech    insight    thought process    psychomotor activity   48 HOUR NURSING ASSESSMENT:  Pt continues to be very disorganized and distracted. Pt began to eat her breakfast and then was up dancing at the end of the [place] within  a few minutes. Pt continues to be hyperverbal with  rambling and tangential speech. Pt's affect is labile and her eye contact is inconsistent. Pt will frequently talk with her eye closed.   Pt continues to have poor sleep. Pt reported this AM that she does not feel that this is an issue as it is the end of summer and she wants to enjoy the days as she had slept so much earlier in the year due to covid.     Pt has attended programming today. Pt came out of afternoon group and reported that she did not feel right. \"I think I'm over medicated\". Pt describes feeling overly tired. Pt requested a warm blanket. Pt continues to be easily distracted and needed prompts to stay on task. Blood pressure was checked 134/88 with a pulse of 92 sitting and 137/88 with a pulse of 98. Temp was 97.7, O2 98% and respirations 16. Aneliya was notified- no new orders.      "

## 2020-10-07 NOTE — PROGRESS NOTES
Spoke to her , Carloz, via phone.  He is very concerned about her and concerned that she will be discharged too soon.  He reports that in August, prior to this manic episode, their relationship was very good and close.  He reports that her christiano often presents as irritability and anger towards him, often accusing him of things that aren't true.  He is hopeful that she can stay in the hospital until those delusions decrease.  He is available for a patient care conference to discuss further if needed.

## 2020-10-08 PROCEDURE — 250N000013 HC RX MED GY IP 250 OP 250 PS 637: Performed by: NURSE PRACTITIONER

## 2020-10-08 PROCEDURE — 250N000013 HC RX MED GY IP 250 OP 250 PS 637: Performed by: PHYSICIAN ASSISTANT

## 2020-10-08 PROCEDURE — G0177 OPPS/PHP; TRAIN & EDUC SERV: HCPCS

## 2020-10-08 PROCEDURE — 124N000003 HC R&B MH SENIOR/ADOLESCENT

## 2020-10-08 PROCEDURE — 99233 SBSQ HOSP IP/OBS HIGH 50: CPT | Performed by: NURSE PRACTITIONER

## 2020-10-08 RX ADMIN — MELATONIN TAB 3 MG 6 MG: 3 TAB at 22:29

## 2020-10-08 RX ADMIN — THERA TABS 1 TABLET: TAB at 07:57

## 2020-10-08 RX ADMIN — LAMOTRIGINE 50 MG: 25 TABLET ORAL at 07:57

## 2020-10-08 RX ADMIN — TRAZODONE HYDROCHLORIDE 50 MG: 50 TABLET ORAL at 22:29

## 2020-10-08 RX ADMIN — DIVALPROEX SODIUM 750 MG: 250 TABLET, FILM COATED, EXTENDED RELEASE ORAL at 22:04

## 2020-10-08 RX ADMIN — Medication 1 CAPSULE: at 22:04

## 2020-10-08 RX ADMIN — Medication 1 CAPSULE: at 07:57

## 2020-10-08 RX ADMIN — HYDROXYZINE HYDROCHLORIDE 25 MG: 25 TABLET, FILM COATED ORAL at 22:46

## 2020-10-08 RX ADMIN — OLANZAPINE 10 MG: 10 TABLET, FILM COATED ORAL at 22:04

## 2020-10-08 ASSESSMENT — ACTIVITIES OF DAILY LIVING (ADL)
LAUNDRY: WITH SUPERVISION
HYGIENE/GROOMING: INDEPENDENT
LAUNDRY: WITH SUPERVISION
ORAL_HYGIENE: INDEPENDENT
ORAL_HYGIENE: INDEPENDENT
DRESS: INDEPENDENT
DRESS: INDEPENDENT
HYGIENE/GROOMING: INDEPENDENT

## 2020-10-08 NOTE — PROGRESS NOTES
Work Completed: Participated in team meeting  Updated patient's AVS    Discharge plan or goal: TBD pending stabilization                Barriers to discharge: sx stabilization, medication management , safe discharge plan
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
## 2020-10-08 NOTE — PROGRESS NOTES
Pt has been pleasant and cooperative. Pt's mood has been less labile. Pt continues to be easily distracted and disorganized but less so than on admission. Pt speech continues to be rambling and tangential, however this has lessened as well.   Pt denies SI/SIB.

## 2020-10-08 NOTE — PROGRESS NOTES
" 10/07/20 2200   Art Therapy   Type of Intervention structured groups   Response participates with encouragement   Hours 1   Treatment Detail    (Art Therapy- Me collage/ strengths/ self esteem   Objective- Patients use art media, the creative process & resulting artwork to:explore feelings,reconcile emotions, gain self-awareness/ self esteem, manage behaviors, develop social skills, improve reality orientation, & reduce anxiety /depression.     Outcome- Pt reported feeling \"sad I have'nt talked to my children\".  She did a thoughtful  Collage about this and missing her kids. Her speech was improved but still somewhat tangential. She was very tired and would lay her head on the table. When writer spoke to her about showing her kids her art to explain her feelings , she was clear and said \" I think I will at the right time.\"           "

## 2020-10-08 NOTE — PLAN OF CARE
Problem: OT General Care Plan  Goal: OT Goal 1  Description: Will attend OT groups improve concentration and motivation by engaging in more challenging and success oriented options while also improving insight with comments/answers made about mental health needs    Pt attended 2 out of 3 OT groups offered. Pt actively participated in occupational therapy clinic. Pt was able to ask for assistance as needed, and returned to a creative expression task with assistance in task set-up. Pt demonstrated good focus, planning, and attention to detail. She tends to appropriately seek feedback in planning for her task, then is able to follow through with her plan independently. Improved sustained attention observed today, as she was able to focus on her task for a full x50 minutes. Social with peers and writer throughout, and she is significantly less hyperverbal and tangential in conversation in comparison to earlier in the week. Enthusiastically shared how much she enjoyed group today. Pleasant, cooperative, and engaged. Affect was significantly less labile today in comparison to groups earlier in the week.

## 2020-10-09 PROCEDURE — 124N000003 HC R&B MH SENIOR/ADOLESCENT

## 2020-10-09 PROCEDURE — 250N000013 HC RX MED GY IP 250 OP 250 PS 637: Performed by: NURSE PRACTITIONER

## 2020-10-09 PROCEDURE — 99233 SBSQ HOSP IP/OBS HIGH 50: CPT | Performed by: NURSE PRACTITIONER

## 2020-10-09 PROCEDURE — 250N000013 HC RX MED GY IP 250 OP 250 PS 637: Performed by: PHYSICIAN ASSISTANT

## 2020-10-09 PROCEDURE — G0177 OPPS/PHP; TRAIN & EDUC SERV: HCPCS

## 2020-10-09 RX ORDER — DIVALPROEX SODIUM 500 MG/1
1000 TABLET, EXTENDED RELEASE ORAL EVERY EVENING
Status: DISCONTINUED | OUTPATIENT
Start: 2020-10-09 | End: 2020-10-26 | Stop reason: HOSPADM

## 2020-10-09 RX ADMIN — DIVALPROEX SODIUM 1000 MG: 500 TABLET, EXTENDED RELEASE ORAL at 21:12

## 2020-10-09 RX ADMIN — Medication 1 CAPSULE: at 21:12

## 2020-10-09 RX ADMIN — OLANZAPINE 10 MG: 10 TABLET, FILM COATED ORAL at 21:14

## 2020-10-09 RX ADMIN — Medication 1 CAPSULE: at 08:31

## 2020-10-09 RX ADMIN — THERA TABS 1 TABLET: TAB at 08:31

## 2020-10-09 ASSESSMENT — ACTIVITIES OF DAILY LIVING (ADL)
DRESS: STREET CLOTHES;INDEPENDENT
ORAL_HYGIENE: INDEPENDENT
DRESS: STREET CLOTHES;INDEPENDENT
HYGIENE/GROOMING: INDEPENDENT
HYGIENE/GROOMING: INDEPENDENT
LAUNDRY: WITH SUPERVISION
LAUNDRY: WITH SUPERVISION
ORAL_HYGIENE: INDEPENDENT

## 2020-10-09 NOTE — PROGRESS NOTES
"            Work Completed:Met with Pt completed PPC discussed her .  She wants him to move out for 30 days when she comes home.  Discuss whether we can talk with him.    She confirms she signed the MESERET and it is ok to tell him \"everything.\"  Pt is working on a \"letter' to him numerous pages which seems to be in progress.  This contains her \"plan\" for discharge which sounds like it is mainly what he is to do or not do.     Writer called P's .  He reports she hung up on him 2 ndays ago and since then has not answered his calls.  He is concerned about her and does not want her discharged too soon.  Discussed whether he should call her suggested maybe give it a couple of days to see if the medicine starts working.  Made the same suggestion regarding the children.    Discharge plan or goal: Pt will discharge home when stable and continues with outpatient services.                Barriers to discharge: Pt is not stable she is very manic and delusional.    "
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
## 2020-10-09 NOTE — PROGRESS NOTES
"   10/08/20 2000   Music Therapy   Type of Participation Music therapy group   Response Participates with cues/redirection   Hours 1   Participated in Music Therapy intervention of [employer] today.  Goals of session were focusing, memory recall, positive distraction, emotional containment and social cohesion.  Rosy showed less dramatic large motor activity in group tonight than in previous sessions.  She still tended to be somewhat intrusive in speech, but would often correct herself.  She was exuhberant about the intervention, saying \"I'm going to frame this!) Her bingo card and \"fortune prize\".  Her affect was bright.  "

## 2020-10-09 NOTE — PLAN OF CARE
Problem: OT General Care Plan  Goal: OT Goal 1  Description: Will attend OT groups improve concentration and motivation by engaging in more challenging and success oriented options while also improving insight with comments/answers made about mental health needs    Pt attended 2 out of 2 OT groups offered. Pt actively participated in occupational therapy clinic. Pt was able to ask for assistance as needed, and independently returned to a creative expression task. Pt demonstrated good focus, planning, and attention to detail. Social with peers and writer throughout. She continues to have moments of hyperverbal/pressured/tangential speech in conversation, though she is able to stop herself and refocus on her task independently, which is an improvement. Moments of high energy dancing observed in response to listening to her requested music (Linda), though she was again able to self-regulate and refocus on her task afterwards. Pleasant, cooperative, and engaged throughout groups today.

## 2020-10-09 NOTE — PROGRESS NOTES
"Pt was an active participant in dance/movement therapy (DMT) using release, twist and relaxing movements. Pt explored body shape, carving out space in the environment and embodying images of \"infinity\" and \"flowers blossoming.\"      Pt had long periods of completely nonverbal expression and only interrupted a peer 2x and accepted gentle redirection both times.  She stated she felt so relaxed she thought she might fall asleep.         10/08/20 3756   Dance Movement Therapy   Type of Intervention structured groups   Response participates, initiates socially appropriate   Hours 1     "

## 2020-10-09 NOTE — PLAN OF CARE
Pt is pleasant and cooperative. Continues to be manic and hyperverbal. Tangential and rambling speech. Has difficulty staying on task. Distractible. Full range affect. Mood is good. Reports sleeping well last night. Present in milieu. Attending and participating in groups. Engaged in her care. Requesting to speak with dietician to help her make healthier food choices to help prevent weight gain. Appetite adequate. Nutrition consult entered per patient's request.

## 2020-10-09 NOTE — PLAN OF CARE
Problem: General Plan of Care (Inpatient Behavioral)  Goal: Individualization/Patient Specific Goal (IP Behavioral)  Description: The patient and/or their representative will achieve their patient-specific goals related to the plan of care.    The patient-specific goals include:  Flowsheets (Taken 10/9/2020 1028)  Areas of Vulnerability: Extremely manic, relationship issues with , poor insight.  Patient Strengths:   Stable housing   Setting and pursuing goals   Financial stability  Note: Personal Plan of Care    Reasons you are in the Hospital  I was standing in the foyer and the police came.  The therapist called them because she thought I needed a med adjustment.  2.  3.  4.    Goals for Discharge   My  Carloz needs to go somewhere for 30 days.  2.   Go home and get into the hot tub.  3.

## 2020-10-09 NOTE — PLAN OF CARE
"  Problem: Manic Symptoms  Goal: Manic Symptoms  Description: Signs and symptoms of listed problems will be absent or manageable.  Outcome: Improving  Note: Some improvement noted: Patient was able to focus on task for some time. Boundaries were improved, she respected other patient's boundaries and privacy. However, after she made multiple phone calls, she stated, there voicemail is full, as most likely she had left multiple messages.   Patient's thoughts are still tangential and disorganized at times. Mood is elated, speech is fast and loud. She denied pain and all other physical issues. Keeps self clean and well groomed.   Pt's concern: patient wants to talk to the hospital dietitian. 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     Addendum:   Patient had more difficulties staying on task as the evening progressed: she was [facility]; talking about taking a shower for a long time; but instead she was talking with staff and other patients. Patient was trying to tell how \"funny\" she is and was. Laughing.     She verbalized her distress about her 's frequent phone calls and requested to not take any more calls from him this evening after 2100. She talked at length about their stressful relationship over many yrs of their marriage.     Thoughts are still tangential, speech is still pressured and loud.   Patient requested to take medications after she is done with her shower.     Addendum: patient took scheduled medications without hesitation. She was offered PRN Trazodone and Melatonin at 22:30 and she agreed to take them to facilitate sleep at night. Patient was educated on the rest of her available PRN medication options.      Addendum: patient reported feeling anxious because of itchy rash over her arms, back, and abdomen. On observation, she had red rash on the areas she reported. She said " it is a new symptom that appeared after she took a shower tonight. She was offered PRN Hydroxyzine for anxiety and cream for possible dryness on her skin.   Patient was encouraged to continue to monitor her rash and report it to staff if it worsens.

## 2020-10-10 PROCEDURE — 250N000013 HC RX MED GY IP 250 OP 250 PS 637: Performed by: NURSE PRACTITIONER

## 2020-10-10 PROCEDURE — 250N000013 HC RX MED GY IP 250 OP 250 PS 637: Performed by: PHYSICIAN ASSISTANT

## 2020-10-10 PROCEDURE — 124N000003 HC R&B MH SENIOR/ADOLESCENT

## 2020-10-10 RX ADMIN — Medication 1 CAPSULE: at 20:33

## 2020-10-10 RX ADMIN — Medication 1 CAPSULE: at 09:18

## 2020-10-10 RX ADMIN — OLANZAPINE 10 MG: 10 TABLET, FILM COATED ORAL at 20:34

## 2020-10-10 RX ADMIN — THERA TABS 1 TABLET: TAB at 09:18

## 2020-10-10 RX ADMIN — DIVALPROEX SODIUM 1000 MG: 500 TABLET, EXTENDED RELEASE ORAL at 20:34

## 2020-10-10 ASSESSMENT — ACTIVITIES OF DAILY LIVING (ADL)
DRESS: INDEPENDENT;STREET CLOTHES
HYGIENE/GROOMING: INDEPENDENT
HYGIENE/GROOMING: INDEPENDENT
DRESS: INDEPENDENT
ORAL_HYGIENE: INDEPENDENT
ORAL_HYGIENE: INDEPENDENT

## 2020-10-10 NOTE — PLAN OF CARE
"  Problem: Adult Inpatient Plan of Care  Goal: Optimal Comfort and Wellbeing  Outcome: No Change   Patient has been present in the milieu all shift. Her affect is full range. Her mood is \"great\". She states she \"feels better today after a shower\". She attended all groups today and \"loved them\". She wants to tell her doctor that \"I want my Zyprexa changed to 5 mg\". She states she is in the hospital because \"they told me I should go to the hospital to get medication adjustment\". She denies SI and SIB. She endorses hearing voices but \"not when I have my headphones on\". She states the voices are \"singing praise songs\". She states she \"never\" has racing thoughts\" I am just talking and thinking faster\". She endorses feeling safe and \"very hopeful\". She states her concentration is \"amazing\" and denies pain. She rates her depression a 1 because she is sad she has to be here with the weather so \"great\". She rates anxiety a 3 because \"the living conditions are less then I am accustomed but I'm a survivor\". Her sleep and appetite are great\". Her only concern is \"I'm a bit spacey feeling, I itch everywhere and I'm only happy in water-I miss my bathtub and hat for these lights\".   "

## 2020-10-10 NOTE — CONSULTS
Nutrition/Dietitian Brief Note:     Consult received regarding: Pt would like to speak with dietician to make healthier food choices to prevent weight gain. RD team off site for weekend. Spoke with RN this morning, pt would like to meet wit RD Monday when on site. Will pass on to RD team to meet with pt when on site.     Alondra Ryan RD, LD   Clinical Dietitian   Weekend Pager: [phone]

## 2020-10-10 NOTE — PROGRESS NOTES
10/09/20 2100   Therapeutic Recreation   Type of Intervention structured groups   Activity game   Response Refuses     Pt briefly attended the Therapeutic Recreation group with focus on leisure participation, social engagement, and critical thinking.  Pt chose not to participate in the group recreational intervention via a group game.  Pt left group after a few minutes.

## 2020-10-10 NOTE — PROGRESS NOTES
"Patient was visible and sociable with peers on the milieu. She presented with a full range affect and was pleasant and cooperative. Patient denies SI/SIB, auditory and visual hallucinations, and depression but endorsed \"a little bit of anxiety\" because \"I am here and not in [place]!\" She had an overall decent evening; she was less hyper-verbal, briefly attended recreational therapy group, ate meals and snacks, took her medications and a shower. She reported no medication side effect and none was observed.    "

## 2020-10-10 NOTE — PROGRESS NOTES
Pt reported no SI, SIB or hallucinations. Pt was active on the milieu throughout the day, participated in groups and socialized in the lounge with peers and staff. Pt reports having a good day, enjoying the conversations in group and no complains.        10/10/20 1400   Behavioral Health   Hallucinations denies / not responding to hallucinations   Thinking distractable   Orientation person: oriented;place: oriented;time: oriented;date: oriented   Memory baseline memory   Insight insight appropriate to situation;admits / accepts   Judgement impaired   Eye Contact at examiner   Affect full range affect   Mood mood is calm;elated   Physical Appearance/Attire attire appropriate to age and situation   Hygiene well groomed   Suicidality other (see comments)  (Denies)   1. Wish to be Dead (Recent) No   2. Non-Specific Active Suicidal Thoughts (Recent) No   3. Active Sucidal Ideation with any Methods (Not Plan) Without Intent to Act (Recent) No   4. Active Suicidal Ideation with Some Intent to Act, Without Specific Plan (Recent) No   5. Active Suicidal Ideation with Specific Plan and Intent (Recent) No   Activities of Daily Living   Hygiene/Grooming independent   Oral Hygiene independent   Dress independent   Room Organization independent

## 2020-10-11 PROCEDURE — 124N000003 HC R&B MH SENIOR/ADOLESCENT

## 2020-10-11 PROCEDURE — H2032 ACTIVITY THERAPY, PER 15 MIN: HCPCS

## 2020-10-11 PROCEDURE — 250N000013 HC RX MED GY IP 250 OP 250 PS 637: Performed by: NURSE PRACTITIONER

## 2020-10-11 PROCEDURE — 250N000013 HC RX MED GY IP 250 OP 250 PS 637: Performed by: PHYSICIAN ASSISTANT

## 2020-10-11 RX ADMIN — Medication 1 CAPSULE: at 08:06

## 2020-10-11 RX ADMIN — OLANZAPINE 10 MG: 10 TABLET, FILM COATED ORAL at 20:22

## 2020-10-11 RX ADMIN — THERA TABS 1 TABLET: TAB at 08:06

## 2020-10-11 RX ADMIN — Medication 1 CAPSULE: at 20:21

## 2020-10-11 RX ADMIN — DIVALPROEX SODIUM 1000 MG: 500 TABLET, EXTENDED RELEASE ORAL at 20:21

## 2020-10-11 ASSESSMENT — ACTIVITIES OF DAILY LIVING (ADL)
HYGIENE/GROOMING: INDEPENDENT
HYGIENE/GROOMING: INDEPENDENT
ORAL_HYGIENE: INDEPENDENT
LAUNDRY: UNABLE TO COMPLETE
DRESS: STREET CLOTHES;INDEPENDENT
DRESS: STREET CLOTHES;INDEPENDENT
ORAL_HYGIENE: INDEPENDENT
LAUNDRY: WITH SUPERVISION

## 2020-10-11 NOTE — PROGRESS NOTES
Pt is pleasant and cooperative. Continues to be manic and hyperverbal with tangential speech. Has difficulty staying on task. Distractible with poor concentration. Full range affect. Mood is good. Present in milieu and social with peers. Attending and participating in groups. Reports sleeping well last night. Appetite good.

## 2020-10-12 PROCEDURE — 250N000013 HC RX MED GY IP 250 OP 250 PS 637: Performed by: NURSE PRACTITIONER

## 2020-10-12 PROCEDURE — G0177 OPPS/PHP; TRAIN & EDUC SERV: HCPCS

## 2020-10-12 PROCEDURE — 250N000013 HC RX MED GY IP 250 OP 250 PS 637: Performed by: PHYSICIAN ASSISTANT

## 2020-10-12 PROCEDURE — H2032 ACTIVITY THERAPY, PER 15 MIN: HCPCS

## 2020-10-12 PROCEDURE — 99233 SBSQ HOSP IP/OBS HIGH 50: CPT | Performed by: NURSE PRACTITIONER

## 2020-10-12 PROCEDURE — 124N000003 HC R&B MH SENIOR/ADOLESCENT

## 2020-10-12 RX ORDER — LITHIUM CARBONATE 300 MG/1
300 TABLET, FILM COATED, EXTENDED RELEASE ORAL EVERY 12 HOURS SCHEDULED
Status: DISCONTINUED | OUTPATIENT
Start: 2020-10-12 | End: 2020-10-15

## 2020-10-12 RX ORDER — OLANZAPINE 15 MG/1
15 TABLET ORAL AT BEDTIME
Status: DISCONTINUED | OUTPATIENT
Start: 2020-10-12 | End: 2020-10-26 | Stop reason: HOSPADM

## 2020-10-12 RX ADMIN — Medication 1 CAPSULE: at 08:31

## 2020-10-12 RX ADMIN — Medication 1 CAPSULE: at 21:11

## 2020-10-12 RX ADMIN — DIVALPROEX SODIUM 1000 MG: 500 TABLET, EXTENDED RELEASE ORAL at 21:11

## 2020-10-12 RX ADMIN — OLANZAPINE 15 MG: 15 TABLET, FILM COATED ORAL at 21:12

## 2020-10-12 RX ADMIN — THERA TABS 1 TABLET: TAB at 08:31

## 2020-10-12 RX ADMIN — LITHIUM CARBONATE 300 MG: 300 TABLET, EXTENDED RELEASE ORAL at 21:12

## 2020-10-12 RX ADMIN — LITHIUM CARBONATE 300 MG: 300 TABLET, EXTENDED RELEASE ORAL at 10:17

## 2020-10-12 ASSESSMENT — ACTIVITIES OF DAILY LIVING (ADL)
ORAL_HYGIENE: INDEPENDENT
HYGIENE/GROOMING: INDEPENDENT
DRESS: STREET CLOTHES;INDEPENDENT
LAUNDRY: WITH SUPERVISION
DRESS: INDEPENDENT
LAUNDRY: WITH SUPERVISION
HYGIENE/GROOMING: INDEPENDENT
ORAL_HYGIENE: INDEPENDENT

## 2020-10-12 NOTE — PROGRESS NOTES
10/11/20 2200   Therapeutic Recreation   Type of Intervention structured groups   Activity exercise   Response Participates, initiates socially appropriate   Hours 0.5     Pt attended the structured Therapeutic Recreation group with a focus on leisure participation, relaxation, and exercise. Pt was in group for approximately for 30 minutes.. Pt chose not to follow along  in the guided chair exercise routine, but did some exercises on her own. Some of the exercises she did in her chair and some she went on the floor to do.  Pt was encouraged to use positive imagery with the deep breathing and stretching to foster relaxation, improves focus, and reduce stress.

## 2020-10-12 NOTE — PLAN OF CARE
Problem: OT General Care Plan  Goal: OT Goal 1  Description: Will attend OT groups improve concentration and motivation by engaging in more challenging and success oriented options while also improving insight with comments/answers made about mental health needs    Pt attended 2 out of 2 OT groups offered. Pt actively participated in occupational therapy clinic. Pt was able to ask for assistance as needed, and independently returned to a creative expression task. Pt demonstrated good focus, planning, and attention to detail. Intermittent moments of hyperverbal and tangential speech in interactions, though she was able to refocus on her task independently. Towards the end of group, she closed her eyes for a few minutes, explaining that she was tired. Pleasant and cooperative. Affect appeared somewhat labile at times, though primarily bright. Will continue to assess.

## 2020-10-12 NOTE — PROGRESS NOTES
Pt left her room at 0100 complaining of feeling cold. Pt was given a warm blanket and hot herbal tea, and then presented with exaggerated cheerful affect and hyper verbal. While Pt was talking to writer, Pt decided one of her friends likely  recently because Pt had not heard from her friend after sending her a care package. Pt maintained an overly cheerful affect until writer went to check on other patients, then began crying loudly in the common area. Pt continued to be extra cheerful and chatty with staff, then would immediately begin sobbing when staff engaged in other activities. After listening to calming music in the common area and having another herbal tea, writer walked Pt to her room and let her share some memories of the friend she thinks . Pt thanked writer, entered her room and continued to cry loudly.    Addendum 0614: Pt slept 1.5 hours and spent night pacing, writing obituaries, being overly cheerful, being overly tearful, and moving between multiple projects (manic). Pt had difficulty tracking conversation and had to be redirected multiple times for intrusiveness. Pt was agreeable to staff interventions but needed constant redirection when engaged.

## 2020-10-12 NOTE — PROGRESS NOTES
"   10/12/20 1400   Music Therapy   Type of Participation Music therapy group   Response Participates with cues/redirection   Hours 1   Participated in Music Therapy group focused on \"Reminiscing through Music/The Power of Music\".  Patients selected songs and reflected on different times of their life, and the power of music, in their own words.  Goals of group were to improve mood and cognitive functioning by reminiscing, and provide for socialization/group cohesion.    Rosy had effervescent participation, per her usual, dancing dramatically to the music, but was redirectable during discussion when cued.  She met session goals and spoke of how \"now we will be able to think better for the rest of the day (because the music has connected our brain synapses)...\"  Her affect was bright.  "

## 2020-10-12 NOTE — PLAN OF CARE
"Pt continues to present as manic. Hyperverbal with tangential speech. Labile at times going from elated to crying. Also irritable at times. Pt has poor boundaries with peers but is redirectable, yet needs frequent redirection. Attending and participating in groups. Did not sleep well last night. Appetite adequate. Pt declined [place] this morning stating \"that's poison to my body; I was on it for years\". Pt indicates that she would like to speak with provider prior to considering taking this medication. After speaking with provider, pt was agreeable to taking Lithium. Pt spoke with dietician today regarding healthier food choices from menu.  "
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
## 2020-10-12 NOTE — PROGRESS NOTES
"Nutrition Services Brief Note    Received pt/family request consult: \"Pt would like to speak with dietician to make healthier food choices to prevent weight gain.\"    Per review of selections in [facility], pt is ordering tid meals with 3 day average providing 3289 kcals and 131 g protein if 100% consumed.  Contacted RN by phone to discuss nutrition consult.  Per RN, pt is eating 50-75% of meals.      Pt visit done by phone today in light of reduced in person exposure during COVID outbreak.  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  She reports gaining weight during previous hospital stays and was 219 lbs in June.  Per pt unhappy with meal service here and that trays have not been correct.      Last available weight (10/11)  189 lbs  Hospital admission weight (10/2) 188 lbs  Weight has been stable since admission (x9 days).  No recent weight hx available per chart review.    Wt Readings from Last 20 Encounters:   10/11/20 85.9 kg (189 lb 4.8 oz)   06/13/17 106.2 kg (234 lb 3.2 oz)   01/17/17 94.8 kg (209 lb)   01/08/17 94.8 kg (209 lb)   03/24/14 88.2 kg (194 lb 8 oz)   08/13/13 87.8 kg (193 lb 8 oz)   12/17/12 87.8 kg (193 lb 9.6 oz)   06/26/12 86 kg (189 lb 8 oz)   02/03/12 88.1 kg (194 lb 4.8 oz)   06/04/09 83 kg (183 lb)   05/19/09 86.6 kg (191 lb)   04/07/09 74.8 kg (165 lb)   06/06/08 73.9 kg (163 lb)   04/16/08 71.6 kg (157 lb 12 oz)     Interventions:  -Offered to send low calorie snacks between meals, but pt declined at this time.  -Discussed general healthy eating guidelines and sent handouts to unit per pt request.    -Contacted diet office to request supervisor check meal trays for accuracy.    Follow Up/Monitoring:  No further follow up planned at this time.  Please consult if further needs arise before discharge.    Carolann Perez RD, LD      "

## 2020-10-12 NOTE — PROGRESS NOTES
"Patient presents as manic, hyper verbal, tangential. She has been present in the [facility] all shift. Her affect is full range. Her mood is \"fabulous\". She insists she has been here for 92 days. Her goal today was \"to do crafts, music, sing and to dress up for [employer]\". She would like her doctor to know \"I believe she might be an benedicto but no one can tell for sure\". She states she is in the hospital because \"I needed a vacation with a 5 star accommodations\". She denies SI and SIB.  She denies AH and VH. She denies racing thoughts but states \"I can't write as fast as I think so I'm hiring a team to assist\". 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  She endorses feeling safe and hopeful. Her coping skills are listening to music. She denies depression and anxiety stating \"No I'm good\". Her sleep and appetite are \"good\". She attended and \"overly\" participated in all groups. She had to be reminded to stay on topic. She took a shower today. She has no concerns.   "

## 2020-10-12 NOTE — PROGRESS NOTES
Work Completed: Introduced self to pt. Pt signed MESERET for friend.    Discharge plan or goal: Discharge home                Barriers to discharge: Mental health stabilization

## 2020-10-13 LAB
ALBUMIN SERPL-MCNC: 3.6 G/DL (ref 3.4–5)
ALP SERPL-CCNC: 61 U/L (ref 40–150)
ALT SERPL W P-5'-P-CCNC: 57 U/L (ref 0–50)
AMMONIA PLAS-SCNC: 14 UMOL/L (ref 10–50)
AST SERPL W P-5'-P-CCNC: 20 U/L (ref 0–45)
BILIRUB DIRECT SERPL-MCNC: <0.1 MG/DL (ref 0–0.2)
BILIRUB SERPL-MCNC: 0.3 MG/DL (ref 0.2–1.3)
PLATELET # BLD AUTO: 237 10E9/L (ref 150–450)
PROT SERPL-MCNC: 7.4 G/DL (ref 6.8–8.8)
VALPROATE SERPL-MCNC: 74 MG/L (ref 50–100)

## 2020-10-13 PROCEDURE — 250N000013 HC RX MED GY IP 250 OP 250 PS 637: Performed by: PHYSICIAN ASSISTANT

## 2020-10-13 PROCEDURE — 99233 SBSQ HOSP IP/OBS HIGH 50: CPT | Performed by: NURSE PRACTITIONER

## 2020-10-13 PROCEDURE — 85049 AUTOMATED PLATELET COUNT: CPT | Performed by: NURSE PRACTITIONER

## 2020-10-13 PROCEDURE — 82140 ASSAY OF AMMONIA: CPT | Performed by: NURSE PRACTITIONER

## 2020-10-13 PROCEDURE — 250N000013 HC RX MED GY IP 250 OP 250 PS 637: Performed by: NURSE PRACTITIONER

## 2020-10-13 PROCEDURE — H2032 ACTIVITY THERAPY, PER 15 MIN: HCPCS

## 2020-10-13 PROCEDURE — 80164 ASSAY DIPROPYLACETIC ACD TOT: CPT | Performed by: NURSE PRACTITIONER

## 2020-10-13 PROCEDURE — 80076 HEPATIC FUNCTION PANEL: CPT | Performed by: NURSE PRACTITIONER

## 2020-10-13 PROCEDURE — G0177 OPPS/PHP; TRAIN & EDUC SERV: HCPCS

## 2020-10-13 PROCEDURE — 36415 COLL VENOUS BLD VENIPUNCTURE: CPT | Performed by: NURSE PRACTITIONER

## 2020-10-13 PROCEDURE — 124N000003 HC R&B MH SENIOR/ADOLESCENT

## 2020-10-13 RX ADMIN — THERA TABS 1 TABLET: TAB at 08:17

## 2020-10-13 RX ADMIN — LITHIUM CARBONATE 300 MG: 300 TABLET, EXTENDED RELEASE ORAL at 21:47

## 2020-10-13 RX ADMIN — Medication 1 CAPSULE: at 08:17

## 2020-10-13 RX ADMIN — DIVALPROEX SODIUM 1000 MG: 500 TABLET, EXTENDED RELEASE ORAL at 21:48

## 2020-10-13 RX ADMIN — OLANZAPINE 15 MG: 15 TABLET, FILM COATED ORAL at 21:48

## 2020-10-13 RX ADMIN — LITHIUM CARBONATE 300 MG: 300 TABLET, EXTENDED RELEASE ORAL at 08:17

## 2020-10-13 RX ADMIN — Medication 1 CAPSULE: at 21:48

## 2020-10-13 ASSESSMENT — ACTIVITIES OF DAILY LIVING (ADL)
HYGIENE/GROOMING: INDEPENDENT
HYGIENE/GROOMING: INDEPENDENT
DRESS: STREET CLOTHES
DRESS: STREET CLOTHES
ORAL_HYGIENE: INDEPENDENT
ORAL_HYGIENE: INDEPENDENT

## 2020-10-13 NOTE — PROGRESS NOTES
Patient c/o heart palpitations earlier.  HR 90, rest of vital signs WDL.  Patient has had no further complaints of the same since.

## 2020-10-13 NOTE — PROGRESS NOTES
"Behavioral Health  Note    Behavioral Health  Spirituality Group Note    UNIT [address] [place]    Name: Rosy Jean YOB: 1955   MRN: [id number] Age: 65 year old      Patient attended -led group, which included discussion of spirituality, coping with illness and building resilience.    Patient attended group for 1 hrs.    The patient actively participated in group discussion and patient demonstrated an appreciation of topic's application for their personal circumstances. Rosy was quite talkative, but allowed for interruption and redirection. She had difficulty choosing just a couple of values to discuss, but wanted to speak to all of them. She was excited about the topic and said her learning in many of the groups on the floor is like, \"drinking from a fire hose\"     TOPIC: Personal Core Values      Jessica Holguin  Chaplain Resident  Pager: [phone]    "
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
## 2020-10-13 NOTE — PLAN OF CARE
"Problem: OT General Care Plan  Goal: OT Goal 1  Description: Will attend OT groups improve concentration and motivation by engaging in more challenging and success oriented options while also improving insight with comments/answers made about mental health needs    Pt attended 3 out of 3 OT groups offered. Pt actively participated in occupational therapy clinic. Pt was able to ask for assistance as needed, and independently returned to a creative expression task. Pt demonstrated good focus, planning, and attention to detail. Easily frustrated upon making a mistake, but quick to calm and independently problem solve a solution. Social with peers and writer throughout. Pt actively participated in a structured occupational therapy group with a focus on identifying and prioritizing personal values. Pt contributed to a group discussion that facilitated self-reflection and application of personal values. Using a list of values, pt identified spirituality, friends, free time, nature, and respect as her most important values. She spoke about needing to take more time for herself at home, specifically \"3pm-3pm Saturday to Sunday; is that too much to ask for?!\" Many of her responses to discussion prompts revolved around her  and children. Pleasant, cooperative, and engaged throughout groups. Moments of lability observed, though overall bright affect throughout groups.       "
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
## 2020-10-13 NOTE — PROGRESS NOTES
Pt hyper verbal. Slightly irritable but mostly polite. Attended community meeting and other groups. Denied MH symptoms.            10/13/20 1500   Occupational Therapy   Type of Intervention structured groups   Response Initiates, socially acceptable   Hours 3   Behavioral Health   Hallucinations denies / not responding to hallucinations   Thinking distractable;poor concentration   Orientation time: oriented;date: oriented;place: oriented;person: oriented   Memory baseline memory   Insight poor   Judgement impaired   Eye Contact at examiner   Affect full range affect   Mood labile;elated   Physical Appearance/Attire disheveled   Hygiene   (adequate)   1. Wish to be Dead (Recent) No   2. Non-Specific Active Suicidal Thoughts (Recent) No   Elopement Statements about wanting to leave   Activity hyperactive (agitated, impulsive)   Speech tangential;pressured   Medication Sensitivity no stated side effects   Psychomotor / Gait balanced;steady   Activities of Daily Living   Hygiene/Grooming independent   Oral Hygiene independent   Dress street clothes   Room Organization independent
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
## 2020-10-14 PROCEDURE — H2032 ACTIVITY THERAPY, PER 15 MIN: HCPCS

## 2020-10-14 PROCEDURE — 99233 SBSQ HOSP IP/OBS HIGH 50: CPT | Performed by: NURSE PRACTITIONER

## 2020-10-14 PROCEDURE — 250N000013 HC RX MED GY IP 250 OP 250 PS 637: Performed by: PHYSICIAN ASSISTANT

## 2020-10-14 PROCEDURE — 124N000003 HC R&B MH SENIOR/ADOLESCENT

## 2020-10-14 PROCEDURE — 250N000013 HC RX MED GY IP 250 OP 250 PS 637: Performed by: NURSE PRACTITIONER

## 2020-10-14 PROCEDURE — G0177 OPPS/PHP; TRAIN & EDUC SERV: HCPCS

## 2020-10-14 RX ADMIN — DIVALPROEX SODIUM 1000 MG: 500 TABLET, EXTENDED RELEASE ORAL at 21:49

## 2020-10-14 RX ADMIN — LITHIUM CARBONATE 300 MG: 300 TABLET, EXTENDED RELEASE ORAL at 07:41

## 2020-10-14 RX ADMIN — THERA TABS 1 TABLET: TAB at 07:41

## 2020-10-14 RX ADMIN — Medication 1 CAPSULE: at 21:48

## 2020-10-14 RX ADMIN — OLANZAPINE 15 MG: 15 TABLET, FILM COATED ORAL at 21:49

## 2020-10-14 RX ADMIN — LITHIUM CARBONATE 300 MG: 300 TABLET, EXTENDED RELEASE ORAL at 21:49

## 2020-10-14 RX ADMIN — Medication 1 CAPSULE: at 07:41

## 2020-10-14 ASSESSMENT — ACTIVITIES OF DAILY LIVING (ADL)
DRESS: INDEPENDENT;STREET CLOTHES
LAUNDRY: WITH SUPERVISION
LAUNDRY: UNABLE TO COMPLETE
DRESS: INDEPENDENT
ORAL_HYGIENE: INDEPENDENT
HYGIENE/GROOMING: INDEPENDENT
ORAL_HYGIENE: INDEPENDENT
HYGIENE/GROOMING: INDEPENDENT

## 2020-10-14 NOTE — PROGRESS NOTES
Patient attended a 60 minute psychoeducation group on the Cognitive Model. The relationship between thoughts, emotions and behaviors was discussed. Participants discussed several scenarios and ways to change irrational thoughts into positive and rational thoughts. They also discussed the new emotion and behavior that came from positive, rational thoughts. Patient was an active participant. She shared personal examples from her life that were pertinent to topic.

## 2020-10-14 NOTE — PROGRESS NOTES
"Pt exhibiting improved boundaries.  Speech is pressured, tangential and rambling.  Based on prior notes, pt appears to be somewhat improved in general.  No irritability noted.  Present in the milieu.  Pt attended group, although she left on a few occasions.  States she feels \"like things are coming together now.  I feel like I can relax.\"  Sleep is improved.  Not focused on writing notes and lists this shift.      "

## 2020-10-14 NOTE — PROGRESS NOTES
"   10/14/20 1800   Psycho Education   Type of Intervention structured groups   Response participates with cues/redirection   Hours 0.5   Behavioral Health   Hallucinations denies / not responding to hallucinations   Thinking distractable   Orientation person: oriented;place: oriented;date: oriented   Memory baseline memory   Insight poor   Judgement impaired   Eye Contact at examiner   Mood elated   Physical Appearance/Attire attire appropriate to age and situation   Hygiene well groomed   1. Wish to be Dead (Recent) No   2. Non-Specific Active Suicidal Thoughts (Recent) No   Activities of Daily Living   Hygiene/Grooming independent   Oral Hygiene independent   Dress independent;street clothes   Laundry unable to complete   Room Organization independent     Pt spent most of shift in milieu, social with peers/staff. Pt needed to be redirected in group due to negative comments and tangential speech. Pt is disorganized at times and needs to be reminded of appropriate boundaries with peers. Pt stated that she wanted it to be recorded that \"I took a 1.5 hour nap this shift\". Pts appetite is good and stated that \"people don't think I sleep enough\" when asked about sleep. No concerns or issues to report otherwise.   "

## 2020-10-14 NOTE — PLAN OF CARE
"Patient was in and out of group  activities. She continues to display symptoms of  christiano including being hyper verbal, restless and tangential. She reports wanting to write a letter to her  to explain some things to him and wants it mailed. She states \"I feel great, I am getting better sleep than ever before\". She denies other symptoms, no SI/SIB.   "

## 2020-10-14 NOTE — PLAN OF CARE
Problem: OT General Care Plan  Goal: OT Goal 1  Description: Will attend OT groups improve concentration and motivation by engaging in more challenging and success oriented options while also improving insight with comments/answers made about mental health needs    Pt attended 1 out of 2 OT groups offered. Pt actively participated in occupational therapy clinic. Pt was able to ask for assistance as needed, and independently returned to a structured creative expression task. Pt demonstrated good focus, planning, and attention to detail. Social with peers and writer throughout. She continues to have moments of hyperverbal and tangential speech, though significantly less than last week, and she easily refocuses on her task. Pleasant, cooperative, and engaged. Left group a few minutes early to take a nap before lunch.

## 2020-10-14 NOTE — PROGRESS NOTES
10/13/20 [facility]   Therapeutic Recreation   Type of Intervention structured groups   Activity game   Response Participates, initiates socially appropriate   Hours 0.5     Pt participated in Therapeutic Recreation group with focus on socializing, problem solving, and leisure participation. Engaged and cooperative in group recreational intervention; word puzzles. Pt participated for approximately half of the group, leaving on a few different occasions. Pt affect was flat and mood was calm. Pt added to the group discussion during the activity.
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
## 2020-10-15 PROCEDURE — 124N000003 HC R&B MH SENIOR/ADOLESCENT

## 2020-10-15 PROCEDURE — 99233 SBSQ HOSP IP/OBS HIGH 50: CPT | Performed by: NURSE PRACTITIONER

## 2020-10-15 PROCEDURE — G0177 OPPS/PHP; TRAIN & EDUC SERV: HCPCS

## 2020-10-15 PROCEDURE — H2032 ACTIVITY THERAPY, PER 15 MIN: HCPCS

## 2020-10-15 PROCEDURE — 250N000013 HC RX MED GY IP 250 OP 250 PS 637: Performed by: NURSE PRACTITIONER

## 2020-10-15 PROCEDURE — 250N000013 HC RX MED GY IP 250 OP 250 PS 637: Performed by: PHYSICIAN ASSISTANT

## 2020-10-15 RX ORDER — LITHIUM CARBONATE 450 MG
450 TABLET, EXTENDED RELEASE ORAL EVERY 12 HOURS SCHEDULED
Status: DISCONTINUED | OUTPATIENT
Start: 2020-10-15 | End: 2020-10-19

## 2020-10-15 RX ADMIN — Medication 1 CAPSULE: at 21:40

## 2020-10-15 RX ADMIN — LITHIUM CARBONATE 450 MG: 450 TABLET, EXTENDED RELEASE ORAL at 08:50

## 2020-10-15 RX ADMIN — Medication 1 CAPSULE: at 08:50

## 2020-10-15 RX ADMIN — LITHIUM CARBONATE 450 MG: 450 TABLET, EXTENDED RELEASE ORAL at 21:40

## 2020-10-15 RX ADMIN — THERA TABS 1 TABLET: TAB at 08:50

## 2020-10-15 RX ADMIN — DIVALPROEX SODIUM 1000 MG: 500 TABLET, EXTENDED RELEASE ORAL at 21:39

## 2020-10-15 RX ADMIN — OLANZAPINE 15 MG: 15 TABLET, FILM COATED ORAL at 21:40

## 2020-10-15 ASSESSMENT — ACTIVITIES OF DAILY LIVING (ADL)
DRESS: INDEPENDENT
HYGIENE/GROOMING: INDEPENDENT
ORAL_HYGIENE: INDEPENDENT
HYGIENE/GROOMING: INDEPENDENT
LAUNDRY: WITH SUPERVISION
ORAL_HYGIENE: INDEPENDENT
DRESS: INDEPENDENT;STREET CLOTHES
LAUNDRY: UNABLE TO COMPLETE
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
## 2020-10-15 NOTE — PROGRESS NOTES
"   10/15/20 1400   General Information   Art Directive other (see comments)   AT directive was to create an image of a safe place and to identify five items within safe place that represent each of the five senses. Goals of directive: trauma containment, emotional expression. Pt was a quiet participant, focused on task for the full duration of group. Pt finished drawing and briefly shared with group. Pt catina an image of her old house and shared that she catina it in black and white because pt feels she is in a \"black and white place.\" Pt went on to say that she feels she was \"taken away from my home,\" and pt is unsure if she will return. Pt feels that this will be a difficult life transition for her. Pt was a positive participant, mood was calm.   "
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
## 2020-10-15 NOTE — PROGRESS NOTES
Patient slept well the whole shift. This is the first night this week that patient did not get up and come out to the lounge even for one time. Slept a total of 6.5 hours.

## 2020-10-15 NOTE — PLAN OF CARE
"Problem: OT General Care Plan  Goal: OT Goal 1  Description: Will attend OT groups improve concentration and motivation by engaging in more challenging and success oriented options while also improving insight with comments/answers made about mental health needs    Pt attended 2 out of 2 OT groups offered. Pt actively participated in occupational therapy clinic. Pt was able to ask for assistance as needed, and independently initiated a detailed, goal-directed task. Pt demonstrated good attention to task and attention to detail. Social with peers and writer throughout. She continues to be hyperverbal in conversation, though catches herself rambling and tells herself to \"shut up\" with humor. Pt actively participated in a structured occupational therapy topic group involving identification of coping skills beginning with every letter of the alphabet facilitated through group discussion. Pt consistently contributed ideas of positive coping skills, and was receptive and respectful of ideas contributed by peers. Pt identified \"friends, family, reading, music, and humor\" as her most important coping skills, and identified \"writing\" as a coping skill that she intends to start using more frequently. Pleasant, cooperative, and engaged. Bright affect.    "

## 2020-10-15 NOTE — PROGRESS NOTES
" 10/14/20 1200   Art Therapy   Type of Intervention structured groups   Response participates with encouragement   Hours 1   Treatment Detail    (Art Therapy)fall leaves letty- mindfulness   Art Therapy Goal-to cope, express, contribute, regulate and sublimate emotions through the creative arts process and Art Therapy directives within a group setting.     Outcome- pt attended group . She reported feeling ok.  She reported loving her new room's window view. She did some drawings with leaves and with a hand tracing. She talked about it representing a black hole she feels she has fallen in. She mentioned tonight she felt art was like \"  \" , however she has enjoyed creating art in all of the groups.  Pt was pleasant, cooperative and engaged.          "

## 2020-10-16 PROCEDURE — G0177 OPPS/PHP; TRAIN & EDUC SERV: HCPCS

## 2020-10-16 PROCEDURE — 99233 SBSQ HOSP IP/OBS HIGH 50: CPT | Performed by: NURSE PRACTITIONER

## 2020-10-16 PROCEDURE — 250N000013 HC RX MED GY IP 250 OP 250 PS 637: Performed by: PHYSICIAN ASSISTANT

## 2020-10-16 PROCEDURE — 124N000003 HC R&B MH SENIOR/ADOLESCENT

## 2020-10-16 PROCEDURE — H2032 ACTIVITY THERAPY, PER 15 MIN: HCPCS

## 2020-10-16 PROCEDURE — 250N000013 HC RX MED GY IP 250 OP 250 PS 637: Performed by: NURSE PRACTITIONER

## 2020-10-16 RX ADMIN — Medication 1 CAPSULE: at 08:17

## 2020-10-16 RX ADMIN — OLANZAPINE 15 MG: 15 TABLET, FILM COATED ORAL at 21:29

## 2020-10-16 RX ADMIN — Medication 1 CAPSULE: at 21:29

## 2020-10-16 RX ADMIN — DIVALPROEX SODIUM 1000 MG: 500 TABLET, EXTENDED RELEASE ORAL at 21:28

## 2020-10-16 RX ADMIN — THERA TABS 1 TABLET: TAB at 08:17

## 2020-10-16 RX ADMIN — LITHIUM CARBONATE 450 MG: 450 TABLET, EXTENDED RELEASE ORAL at 21:29

## 2020-10-16 RX ADMIN — LITHIUM CARBONATE 450 MG: 450 TABLET, EXTENDED RELEASE ORAL at 08:17

## 2020-10-16 ASSESSMENT — ACTIVITIES OF DAILY LIVING (ADL)
HYGIENE/GROOMING: INDEPENDENT
DRESS: INDEPENDENT;STREET CLOTHES
ORAL_HYGIENE: INDEPENDENT
HYGIENE/GROOMING: INDEPENDENT
ORAL_HYGIENE: INDEPENDENT
DRESS: INDEPENDENT

## 2020-10-16 NOTE — PLAN OF CARE
Problem: General Plan of Care (Inpatient Behavioral)  Goal: Team Discussion  Description: Team Plan:  Note: BEHAVIORAL TEAM DISCUSSION    Participants: Jessica Griffith DNP, Sowmya Bethea RN, Mary Kate Bell [facility]  Progress: Improving  Anticipated length of stay: 3-5 days  Continued Stay Criteria/Rationale: Depression  Medical/Physical: See medical notes  Precautions:   Behavioral Orders   Procedures     Code 1 - Restrict to Unit     Routine Programming     As clinically indicated     Status 15     Every 15 minutes.     Plan: Pt will discharge home once her mental health stabilizes.  Rationale for change in precautions or plan: N/A

## 2020-10-16 NOTE — PLAN OF CARE
Problem: OT General Care Plan  Goal: OT Goal 1  Description: Will attend OT groups improve concentration and motivation by engaging in more challenging and success oriented options while also improving insight with comments/answers made about mental health needs    Pt attended 2 out of 2 OT groups offered. Pt actively participated in occupational therapy clinic. Pt was able to ask for assistance as needed, and independently returned to a detailed, goal-directed task. Pt demonstrated good attention to task and attention to detail. Social with peers and writer throughout, and her physical and verbal boundaries have improved throughout the week. She continues to be pleasant, cooperative, and engaged throughout all groups. Briefly became tearful while listening to a song she requested, otherwise her affect was primarily bright.

## 2020-10-16 NOTE — PROGRESS NOTES
Patient slept 5.25 hours. Came out to the lounge upon waking up. Requested for coffee and continued to be manic, hyperverbal and kept on conversing with the staff.
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
## 2020-10-17 PROCEDURE — H2032 ACTIVITY THERAPY, PER 15 MIN: HCPCS

## 2020-10-17 PROCEDURE — 250N000013 HC RX MED GY IP 250 OP 250 PS 637: Performed by: NURSE PRACTITIONER

## 2020-10-17 PROCEDURE — 124N000003 HC R&B MH SENIOR/ADOLESCENT

## 2020-10-17 PROCEDURE — 250N000013 HC RX MED GY IP 250 OP 250 PS 637: Performed by: PHYSICIAN ASSISTANT

## 2020-10-17 RX ADMIN — MELATONIN TAB 3 MG 6 MG: 3 TAB at 21:09

## 2020-10-17 RX ADMIN — OLANZAPINE 15 MG: 15 TABLET, FILM COATED ORAL at 20:55

## 2020-10-17 RX ADMIN — Medication 1 CAPSULE: at 08:28

## 2020-10-17 RX ADMIN — THERA TABS 1 TABLET: TAB at 08:28

## 2020-10-17 RX ADMIN — LITHIUM CARBONATE 450 MG: 450 TABLET, EXTENDED RELEASE ORAL at 08:28

## 2020-10-17 RX ADMIN — LITHIUM CARBONATE 450 MG: 450 TABLET, EXTENDED RELEASE ORAL at 20:55

## 2020-10-17 RX ADMIN — Medication 1 CAPSULE: at 20:55

## 2020-10-17 RX ADMIN — DIVALPROEX SODIUM 1000 MG: 500 TABLET, EXTENDED RELEASE ORAL at 20:55

## 2020-10-17 ASSESSMENT — ACTIVITIES OF DAILY LIVING (ADL)
HYGIENE/GROOMING: INDEPENDENT
DRESS: INDEPENDENT;STREET CLOTHES
HYGIENE/GROOMING: INDEPENDENT
LAUNDRY: WITH SUPERVISION
ORAL_HYGIENE: INDEPENDENT
DRESS: STREET CLOTHES;INDEPENDENT
ORAL_HYGIENE: INDEPENDENT

## 2020-10-17 NOTE — PROGRESS NOTES
Pt has been pleasant and cooperative. Pt continues to be easily distracted but less than at time of admission. Writer had spoken with her and informed that she would get her AM medications. When writer returned patient had left the lounge and was in her room. Pt admitted that she had become distracted. Pt was medication compliant.   Pt reports that she is working on letters to her  as well as the dietary department issue that she has been having. Pt reports that she has not been receiving there correct items on her trays and items have been missing.

## 2020-10-17 NOTE — PROGRESS NOTES
10/16/20 2200   Behavioral Health   Hallucinations denies / not responding to hallucinations   Thinking poor concentration   Orientation person: oriented;place: oriented   Memory baseline memory   Insight poor   Judgement impaired   Eye Contact at examiner   Affect full range affect   Mood mood is calm   Physical Appearance/Attire attire appropriate to age and situation   Hygiene well groomed   Suicidality   (pt denies)   1. Wish to be Dead (Recent) No   2. Non-Specific Active Suicidal Thoughts (Recent) No   Self Injury   (pt denies)   Elopement   (None )   Activity   (Active in the miliue )   Speech clear;coherent   Medication Sensitivity no observed side effects;no stated side effects   Psychomotor / Gait balanced;steady   Activities of Daily Living   Hygiene/Grooming independent   Oral Hygiene independent   Dress independent   Room Organization independent     Pt was active in the milieu most of the shift. Pt attended and participated in all groups this evening. Pt was out for meals and reports good appetite and sleep. Pt continues to be a little disorganized and exhibits slight poor boundaries with other peers. Pt is cooperative, redirectable and mood is calm. Pt continues to complain about dietary and her tray getting messed up a lot, therefore currently collecting evidence to file a case against dietary. Pt otherwise reports having a good day and denies all other mental health symptoms including SI/SIB.

## 2020-10-17 NOTE — PROGRESS NOTES
" 10/16/20 1300   Art Therapy   Type of Intervention structured groups   Response participates with encouragement   Hours 1   Treatment Detail    (Art Therapy) fall [facility] painting and encouragement    Art Therapy Goal-to cope, express, contribute, regulate and sublimate emotions through the creative arts process and Art Therapy directives within a group setting.     Outcome- pt  participated in group.  She was pleasant, cooperative and engaged. She reported feeling ok. Her saying she worked with for inspiration for her art was a song about  \"walking with the lord.\" He art used metaphor about how relationships are hard and complex. She thanked writer for the group and said she enjoyed it.  "

## 2020-10-18 PROCEDURE — 250N000013 HC RX MED GY IP 250 OP 250 PS 637: Performed by: NURSE PRACTITIONER

## 2020-10-18 PROCEDURE — 124N000003 HC R&B MH SENIOR/ADOLESCENT

## 2020-10-18 PROCEDURE — 250N000013 HC RX MED GY IP 250 OP 250 PS 637: Performed by: PHYSICIAN ASSISTANT

## 2020-10-18 PROCEDURE — H2032 ACTIVITY THERAPY, PER 15 MIN: HCPCS

## 2020-10-18 RX ADMIN — LITHIUM CARBONATE 450 MG: 450 TABLET, EXTENDED RELEASE ORAL at 20:59

## 2020-10-18 RX ADMIN — Medication 1 CAPSULE: at 20:59

## 2020-10-18 RX ADMIN — THERA TABS 1 TABLET: TAB at 08:48

## 2020-10-18 RX ADMIN — Medication 1 CAPSULE: at 08:48

## 2020-10-18 RX ADMIN — LITHIUM CARBONATE 450 MG: 450 TABLET, EXTENDED RELEASE ORAL at 08:48

## 2020-10-18 RX ADMIN — OLANZAPINE 15 MG: 15 TABLET, FILM COATED ORAL at 21:01

## 2020-10-18 RX ADMIN — DIVALPROEX SODIUM 1000 MG: 500 TABLET, EXTENDED RELEASE ORAL at 20:59

## 2020-10-18 RX ADMIN — MELATONIN TAB 3 MG 6 MG: 3 TAB at 20:59

## 2020-10-18 ASSESSMENT — ACTIVITIES OF DAILY LIVING (ADL)
LAUNDRY: UNABLE TO COMPLETE
ORAL_HYGIENE: INDEPENDENT
HYGIENE/GROOMING: INDEPENDENT
DRESS: INDEPENDENT;STREET CLOTHES

## 2020-10-18 NOTE — PLAN OF CARE
Problem: Adult Inpatient Plan of Care  Goal: Absence of Hospital-Acquired Illness or Injury  Intervention: Prevent Skin Injury  Recent Flowsheet Documentation  Taken 10/18/2020 1576 by Sowmya Bethea RN  Body Position: position changed independently     Problem: Manic Symptoms  Goal: Social and Therapeutic (Manic Symptoms)  Description: Signs and symptoms of listed problems will be absent or manageable.  Outcome: Improving     Problem: Manic Symptoms  Goal: Manic Symptoms  Description: Signs and symptoms of listed problems will be absent or manageable.  Outcome: No Change  Flowsheets (Taken 10/18/2020 0980)  Manic Symptoms Assessed: all  Manic Symptoms Present:   thought process   insight   affect   48 HOUR NURSING ASSESSMENT:  Pt has been pleasant and cooperative. Pt denies SI/SIB. Pt's affect and eye contact has been appropriate. Pt continues to have poor insight into her mental health and does not  feel that the money she was spending prior to admission should be an issue.  Pt becomes distracted at times but this is improving. Pt continues to have rambling speech.   Pt has been medication compliant.  Pt has been well.  Pt reports that she has been sleeping better and has had documented sleep between 4.5 and 5 hours a night. Pt reports that she has been napping during the day and evening shifts as well.

## 2020-10-18 NOTE — PROGRESS NOTES
"   10/17/20 2200   Behavioral Health   Hallucinations denies / not responding to hallucinations   Thinking distractable;poor concentration   Orientation person: oriented;place: oriented;date: oriented   Memory baseline memory   Insight denial of illness;poor   Judgement impaired   Eye Contact at examiner   Affect full range affect   Mood mood is calm   Physical Appearance/Attire appears stated age;attire appropriate to age and situation   Hygiene well groomed   Suicidality other (see comments)  (denies)   Self Injury other (see comment)  (denies)   Elopement Statements about wanting to leave   Activity other (see comment)  (active and visible in the milieu)   Speech clear;coherent   Medication Sensitivity no stated side effects;no observed side effects   Psychomotor / Gait balanced;steady   Activities of Daily Living   Hygiene/Grooming independent   Oral Hygiene independent   Dress street clothes;independent   Laundry with supervision   Room Organization independent     Patient presents with a full range affect and has been present/visbile in the milieu. Pt appears to have boundary issues and has been inviting other patients to come to her room so they can look through her window. Patient states that her room has the best view and has been inviting other patients so they can check out the view. Patient presents with manic symtoms. She is hyper verbal, tangential, distractable and continues to have poor concentration. Pt has been independent with her ADL's. Pt's judgement is poor.Pt has been attending groups. She states that she is experiencing a \"brain fog\" patient states that she is not able to think clearly at times and has been forgetful. Patient states that she is experiencing some side affects from medications. She states that he mouth dry. She also states that he has been eating more and is feeling more hungry lately. Patient denies SI/SIB/HI and other mental health symptoms. Patient appears to have poor " insight in regards to her condition and denies having any mental health issues.

## 2020-10-18 NOTE — PROGRESS NOTES
10/17/20 2200   Art Therapy   Type of Intervention structured groups   Response participates with encouragement   Hours 1   Treatment Detail    (Art Therapy) [employer] [place]/ music   Objective- Patients use art media,the creative process & resulting artwork to:explore feelings,reconcile emotions, gain self-awareness/ self esteem, manage behaviors, develop social skills, improve reality orientation, & reduce anxiety /depression.     Outcome- pt said she was feeling the best she had felt since she had arrived to the unit. She said she had a good conversation with her  today , and that things were feeling more normal. She used the group to write with a pen and said she will do the mandala project when she gets back home to her garden. She was much less hyperverbal and enjoyed singing along with the music.

## 2020-10-19 LAB
ANION GAP SERPL CALCULATED.3IONS-SCNC: 8 MMOL/L (ref 3–14)
BUN SERPL-MCNC: 17 MG/DL (ref 7–30)
CALCIUM SERPL-MCNC: 8.8 MG/DL (ref 8.5–10.1)
CHLORIDE SERPL-SCNC: 106 MMOL/L (ref 94–109)
CO2 SERPL-SCNC: 25 MMOL/L (ref 20–32)
CREAT SERPL-MCNC: 0.65 MG/DL (ref 0.52–1.04)
GFR SERPL CREATININE-BSD FRML MDRD: >90 ML/MIN/{1.73_M2}
GLUCOSE SERPL-MCNC: 101 MG/DL (ref 70–99)
LITHIUM SERPL-SCNC: 0.54 MMOL/L (ref 0.6–1.2)
POTASSIUM SERPL-SCNC: 4 MMOL/L (ref 3.4–5.3)
SODIUM SERPL-SCNC: 139 MMOL/L (ref 133–144)

## 2020-10-19 PROCEDURE — 99232 SBSQ HOSP IP/OBS MODERATE 35: CPT | Mod: GT | Performed by: PSYCHIATRY & NEUROLOGY

## 2020-10-19 PROCEDURE — 250N000013 HC RX MED GY IP 250 OP 250 PS 637: Performed by: PHYSICIAN ASSISTANT

## 2020-10-19 PROCEDURE — 250N000013 HC RX MED GY IP 250 OP 250 PS 637: Performed by: PSYCHIATRY & NEUROLOGY

## 2020-10-19 PROCEDURE — 124N000003 HC R&B MH SENIOR/ADOLESCENT

## 2020-10-19 PROCEDURE — 80048 BASIC METABOLIC PNL TOTAL CA: CPT | Performed by: NURSE PRACTITIONER

## 2020-10-19 PROCEDURE — 36415 COLL VENOUS BLD VENIPUNCTURE: CPT | Performed by: NURSE PRACTITIONER

## 2020-10-19 PROCEDURE — 250N000013 HC RX MED GY IP 250 OP 250 PS 637: Performed by: NURSE PRACTITIONER

## 2020-10-19 PROCEDURE — 80178 ASSAY OF LITHIUM: CPT | Performed by: NURSE PRACTITIONER

## 2020-10-19 PROCEDURE — H2032 ACTIVITY THERAPY, PER 15 MIN: HCPCS

## 2020-10-19 PROCEDURE — G0177 OPPS/PHP; TRAIN & EDUC SERV: HCPCS

## 2020-10-19 RX ORDER — LITHIUM CARBONATE 450 MG
450 TABLET, EXTENDED RELEASE ORAL DAILY
Status: DISCONTINUED | OUTPATIENT
Start: 2020-10-20 | End: 2020-10-26 | Stop reason: HOSPADM

## 2020-10-19 RX ORDER — LITHIUM CARBONATE 300 MG/1
600 TABLET, FILM COATED, EXTENDED RELEASE ORAL AT BEDTIME
Status: DISCONTINUED | OUTPATIENT
Start: 2020-10-19 | End: 2020-10-26 | Stop reason: HOSPADM

## 2020-10-19 RX ORDER — LANOLIN ALCOHOL/MO/W.PET/CERES
6 CREAM (GRAM) TOPICAL AT BEDTIME
Status: DISCONTINUED | OUTPATIENT
Start: 2020-10-19 | End: 2020-10-26 | Stop reason: HOSPADM

## 2020-10-19 RX ADMIN — Medication 1 CAPSULE: at 22:15

## 2020-10-19 RX ADMIN — OLANZAPINE 15 MG: 15 TABLET, FILM COATED ORAL at 22:14

## 2020-10-19 RX ADMIN — LITHIUM CARBONATE 450 MG: 450 TABLET, EXTENDED RELEASE ORAL at 08:00

## 2020-10-19 RX ADMIN — Medication 1 CAPSULE: at 08:00

## 2020-10-19 RX ADMIN — DIVALPROEX SODIUM 1000 MG: 500 TABLET, EXTENDED RELEASE ORAL at 22:15

## 2020-10-19 RX ADMIN — LITHIUM CARBONATE 600 MG: 300 TABLET, EXTENDED RELEASE ORAL at 22:14

## 2020-10-19 RX ADMIN — THERA TABS 1 TABLET: TAB at 08:00

## 2020-10-19 RX ADMIN — MELATONIN TAB 3 MG 6 MG: 3 TAB at 22:14

## 2020-10-19 ASSESSMENT — ACTIVITIES OF DAILY LIVING (ADL)
ORAL_HYGIENE: INDEPENDENT
DRESS: INDEPENDENT;STREET CLOTHES
ORAL_HYGIENE: INDEPENDENT
HYGIENE/GROOMING: INDEPENDENT
DRESS: INDEPENDENT;STREET CLOTHES
HYGIENE/GROOMING: INDEPENDENT
LAUNDRY: UNABLE TO COMPLETE

## 2020-10-19 NOTE — PLAN OF CARE
"  Problem: Manic Symptoms  Goal: Manic Symptoms  Description: Signs and symptoms of listed problems will be absent or manageable.  Outcome: No Change  Flowsheets (Taken 10/19/2020 3076)  Manic Symptoms Assessed: all  Manic Symptoms Present:   affect   insight   speech   thought process     Problem: Behavioral Health Plan of Care  Goal: Plan of Care Review  Recent Flowsheet Documentation  Taken 10/19/2020 0902 by Sowmya Bethea RN  Plan of Care Reviewed With: patient   48 HOUR NURSING ASSESSMENT:  Pt has been pleasant and cooperative. Pt continues to have poor insight into her mental health. Pt reports that she has been under a lot of stress due family/friends dying and reports that she has been stressed due to her /Carloz not following \"boundaries\". Pt reports that she is off duty at 8 PM and that he is not suppose to bother her after this time. Pt voiced being upset with Carloz violating this boundary 4 times since August. Pt speech is rambling and tangential. Pt has been appropriate with peers. Pt has been attending  programming and is social with peers.   Pt has been eating well.   Pt has been independent with ADL's. Pt has been paying attention to keeping her room and environment organized.   Pt denies SI/SIB and reports that she would like to go home soon.   Pt has been medication compliant. Pt has used prn melatonin 6 mg x 2 in the last 48 hours and this is now scheduled.  Pt  has been sleeping adequately with documented sleep between 4.5 to 6.5 hours a night.     "

## 2020-10-19 NOTE — PLAN OF CARE
Problem: OT General Care Plan  Goal: OT Goal 1  Description: Will attend OT groups improve concentration and motivation by engaging in more challenging and success oriented options while also improving insight with comments/answers made about mental health needs    Pt attended 2 out of 2 OT groups offered. Pt actively participated in occupational therapy clinic. Pt was able to ask for assistance as needed, and independently initiated a creative expression task. Pt demonstrated good focus, planning, and attention to detail. She appropriately requested feedback in planning for her task. Her ability to sit and focus for longer intervals of time continues to improve. Social with peers and writer throughout. Pleasant, cooperative, and engaged. Bright affect.

## 2020-10-19 NOTE — PLAN OF CARE
Problem: General Plan of Care (Inpatient Behavioral)  Goal: Team Discussion  Description: Team Plan:  Note: BEHAVIORAL TEAM DISCUSSION    Participants: Dr. Ciro Ley, Sowmya Bethea, RN, CAMILLE Rodrigues, Cely Sheldon, DERIAN  Progress:  Improving  Anticipated length of stay: 3-5 days  Continued Stay Criteria/Rationale: Depression, Manic Symptoms   Medical/Physical: See medical notes  Precautions:   Behavioral Orders   Procedures    Code 1 - Restrict to Unit    Routine Programming     As clinically indicated    Status 15     Every 15 minutes.     Plan: Pt will discharge home once her mental health stabilizes.    Rationale for change in precautions or plan: N/A

## 2020-10-19 NOTE — PROGRESS NOTES
"[facility], [place]   Psychiatric Progress Note        Interim History:   The patient's care was discussed with the treatment team during the daily team meeting and/or staff's chart notes were reviewed.  Staff report patient has been slowing down. Still has poor insight.     The patient reports that she is \"better than I have been since July.\" Says that \"August 17th\" she \"fell apart.\" Was not able to sleep. Sleeping better now and reports 6-8 hours a night for the past week. 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 Says that she needs to be home \"for my hot tub\" and does not want to shower here. Denies any SI or HI. Denies psychosis. Somewhat agreeable for this provider to be in contact with her  but says, \"You probably know me as well as he does.\" Says that he was not supporting her need for sleep and was texting her about the [place] protests when she was trying to \"wind down.\" Somewhat pressured and rambling.          Medications:       divalproex sodium extended-release  1,000 mg Oral QPM     lactobacillus rhamnosus (GG)  1 capsule Oral BID     [START ON 10/20/2020] lithium ER  450 mg Oral Daily     lithium ER  600 mg Oral At Bedtime     multivitamin, therapeutic  1 tablet Oral Daily     OLANZapine  15 mg Oral At Bedtime          Allergies:     Allergies   Allergen Reactions     Shellfish Allergy Anaphylaxis          Labs:     Recent Results (from the past 24 hour(s))   Lithium level    Collection Time: 10/19/20  8:14 AM   Result Value Ref Range    Lithium Level 0.54 (L) 0.60 - 1.20 mmol/L   Basic metabolic panel    Collection Time: 10/19/20  8:14 AM   Result Value Ref Range    Sodium 139 133 - 144 mmol/L    Potassium 4.0 3.4 - 5.3 mmol/L    Chloride 106 94 - 109 mmol/L    Carbon Dioxide 25 20 - 32 mmol/L    Anion Gap 8 3 - 14 mmol/L    Glucose 101 (H) 70 - 99 mg/dL    Urea Nitrogen 17 7 - 30 mg/dL    Creatinine 0.65 " 0.52 - 1.04 mg/dL    GFR Estimate >90 >60 mL/min/[1.73_m2]    GFR Estimate If Black >90 >60 mL/min/[1.73_m2]    Calcium 8.8 8.5 - 10.1 mg/dL          Psychiatric Examination:     /85   Pulse 86   Temp 97.5  F (36.4  C) (Temporal)   Resp 16   Wt 86.7 kg (191 lb 1.6 oz)   LMP 12/07/2007   SpO2 96%   BMI 29.93 kg/m    Weight is 191 lbs 1.6 oz  Body mass index is 29.93 kg/m .  Orthostatic Vitals       Most Recent      Sitting Orthostatic /86 10/19 0800    Sitting Orthostatic Pulse (bpm) 79 10/19 0800    Standing Orthostatic /85 10/19 0800    Standing Orthostatic Pulse (bpm) 87 10/19 0800            Appearance: awake, alert and adequately groomed  Attitude:  somewhat cooperative  Eye Contact:  good  Mood:  better  Affect:  intensity is heightened  Speech:  pressured speech and rambling  Psychomotor Behavior:  no evidence of tardive dyskinesia, dystonia, or tics  Thought Process:  illogical  Associations:  no loose associations  Thought Content:  no evidence of suicidal ideation or homicidal ideation, no evidence of psychotic thought and obsessions present  Insight:  partial  Judgement:  fair  Oriented to:  time, person, and place  Attention Span and Concentration:  fair  Recent and Remote Memory:  fair    Clinical Global Impressions  First:  Considering your total clinical experience with this particular patient population, how severe are the patient's symptoms at this time?: 7 (10/05/20 1134)  Compared to the patient's condition at the START of treatment, this patient's condition is: 7 (10/05/20 1134)  Most recent:  Considering your total clinical experience with this particular patient population, how severe are the patient's symptoms at this time?: 7 (10/13/20 1219)  Compared to the patient's condition at the START of treatment, this patient's condition is: 7 (10/13/20 1219)         Precautions:     Behavioral Orders   Procedures     Code 1 - Restrict to Unit     Routine Programming     As  clinically indicated     Status 15     Every 15 minutes.          Diagnoses:     Bipolar affective disorder, most recent episode christiano, severe, without psychosis.   UTI         Plan:     1) Increase Lithium to 450mg Qam and 600mg at bedtime. Level was 0.54 on does of 900mg on 10/19.   2) Continue VPA 1000mg Qday. Level was 74 on 10/13.   3) Continue Zyprexa 15mg at bedtime.   4) Will schedule melatonin.   5) Will coordinate disposition with patient's  Carloz.       Disposition Plan   Reason for ongoing admission: is unable to care for self due to severe psychosis or christiano  Discharge location: home with family  Discharge Medications: not ordered  Follow-up Appointments: not scheduled  Legal Status: voluntary    Video-Visit Details    Type of service:  Video Visit    Video Start Time (time video started): 0947    Video End Time (time video stopped): 0959    Originating Location (pt. Location): Other Station 3[facility]    Distant Location (provider location): Provider remote location    Mode of Communication:  Video Conference via [employer]    Physician has received verbal consent for a Video Visit from the patient? Yes    Entered by: Ciro Ley on 10/19/2020 at 12:32 PM

## 2020-10-19 NOTE — PROGRESS NOTES
" 10/18/20 2200   Art Therapy   Type of Intervention structured groups   Response participates with encouragement   Hours 1   Treatment Detail    (Art Therapy)  [facility]   Objective- Patients use art media,the creative process & resulting artwork to:explore feelings,reconcile emotions, gain self-awareness/ self esteem, manage behaviors, develop social skills, improve reality orientation, & reduce anxiety /depression.     Outcome-  Pt said mood is \" hopeful after watching a [employer] service this morning.  She was distractible but pleasant, she had to be reminded of social distancing in group, otherwise pleasant and cooperative.    "

## 2020-10-20 PROCEDURE — 250N000013 HC RX MED GY IP 250 OP 250 PS 637: Performed by: PHYSICIAN ASSISTANT

## 2020-10-20 PROCEDURE — 99232 SBSQ HOSP IP/OBS MODERATE 35: CPT | Mod: GT | Performed by: PSYCHIATRY & NEUROLOGY

## 2020-10-20 PROCEDURE — G0177 OPPS/PHP; TRAIN & EDUC SERV: HCPCS

## 2020-10-20 PROCEDURE — 250N000013 HC RX MED GY IP 250 OP 250 PS 637: Performed by: NURSE PRACTITIONER

## 2020-10-20 PROCEDURE — 250N000013 HC RX MED GY IP 250 OP 250 PS 637: Performed by: PSYCHIATRY & NEUROLOGY

## 2020-10-20 PROCEDURE — 124N000003 HC R&B MH SENIOR/ADOLESCENT

## 2020-10-20 PROCEDURE — H2032 ACTIVITY THERAPY, PER 15 MIN: HCPCS

## 2020-10-20 RX ADMIN — Medication 1 CAPSULE: at 08:13

## 2020-10-20 RX ADMIN — MELATONIN TAB 3 MG 6 MG: 3 TAB at 21:26

## 2020-10-20 RX ADMIN — LITHIUM CARBONATE 600 MG: 300 TABLET, EXTENDED RELEASE ORAL at 21:26

## 2020-10-20 RX ADMIN — DIVALPROEX SODIUM 1000 MG: 500 TABLET, EXTENDED RELEASE ORAL at 21:26

## 2020-10-20 RX ADMIN — OLANZAPINE 15 MG: 15 TABLET, FILM COATED ORAL at 21:25

## 2020-10-20 RX ADMIN — LITHIUM CARBONATE 450 MG: 450 TABLET, EXTENDED RELEASE ORAL at 08:13

## 2020-10-20 RX ADMIN — THERA TABS 1 TABLET: TAB at 08:13

## 2020-10-20 RX ADMIN — Medication 1 CAPSULE: at 21:26

## 2020-10-20 ASSESSMENT — ACTIVITIES OF DAILY LIVING (ADL)
HYGIENE/GROOMING: INDEPENDENT
HYGIENE/GROOMING: INDEPENDENT
LAUNDRY: UNABLE TO COMPLETE
ORAL_HYGIENE: INDEPENDENT
ORAL_HYGIENE: INDEPENDENT
DRESS: INDEPENDENT
DRESS: INDEPENDENT;STREET CLOTHES

## 2020-10-20 NOTE — PROGRESS NOTES
10/19/20 2100   Coping/Psychosocial   Verbalized Emotional State frustration   Plan of Care Reviewed With patient   Psycho Education   Type of Intervention 1:1 intervention   Response participates with cues/redirection   Behavioral Health   Hallucinations denies / not responding to hallucinations   Thinking poor concentration;distractable   Orientation person: oriented;place: oriented   Memory baseline memory   Insight poor;denial of illness   Judgement impaired   Eye Contact at examiner   Affect full range affect   Mood labile   Physical Appearance/Attire multiple layers   Hygiene well groomed   Activity refusal   Speech tangential;rambling   Psychomotor / Gait steady;balanced;fast   Activities of Daily Living   Hygiene/Grooming independent   Oral Hygiene independent   Dress independent;street clothes   Laundry unable to complete   Room Organization independent     Pt denied SI/SIB/HI. Pt continues to have poor boundaries with peers and needs to be reminded of that. Pt is tangential in conversation and remains fixated on situations that happened many days previous. Pts hygiene is good. Pts appetite is good. Pt attends groups. No concerns or issues to report otherwise.

## 2020-10-20 NOTE — PROGRESS NOTES
Behavioral Health  Note    Behavioral Health  Spirituality Group Note    UNIT [address] [place]    Name: Rosy Jean YOB: 1955   MRN: [id number] Age: 65 year old      Patient attended -led group, which included discussion of spirituality, coping with illness and building resilience.    Patient attended group for 1 hrs.    The patient actively participated in group discussion and patient demonstrated an appreciation of topic's application for their personal circumstances. Rosy shared that she uses gratitude to help impact her attitude which she says is the only thing she has control of at the moment.     Topic: Gratitude       Jessica Holguin  Chaplain Resident  Pager: [phone]

## 2020-10-20 NOTE — PLAN OF CARE
"       Problem: Manic Symptoms  Goal: Social and Therapeutic (Manic Symptoms)  Description: Signs and symptoms of listed problems will be absent or manageable.  Outcome: Improving     48 HOUR NURSING ASSESSMENT:  Pt has been pleasant and cooperative with  staff and peers. Pt denies SI/SIB and reports that she is frustrated due to continued hospitalization. Pt does have poor boundaries at times and needs reminders to keep appropriate distance from peers. Pt speech continues to be rambling and tangential at times but this is improving. Pt continues to minimize her mental health issues and continues to focus on her  and how she feels that he has not respected her need to be \"off duty\". Pt reports that she wants to be involved in her care and hopes that she will able to be on the call when Dr. Ley talks with her .   Pt has been eating well, however she does become distracted at times and will get up and leave her meal. Pt has been attending programming.   Pt has been medication compliant Pt has not used any prn  medications in the last 48 hours.   Pt reports that her sleep has improved and she has documented sleep between 5 and 6.5 hours a night.   "

## 2020-10-20 NOTE — PLAN OF CARE
Problem: OT General Care Plan  Goal: OT Goal 1  Description: Will attend OT groups improve concentration and motivation by engaging in more challenging and success oriented options while also improving insight with comments/answers made about mental health needs    Pt attended 3 out of 3 OT groups offered. Pt actively participated in occupational therapy clinic. Pt was able to ask for assistance as needed, and independently returned to a creative expression task. Pt demonstrated good focus, planning, and attention to detail. Appropriately requested feedback in planning the details of her task. Social with peers and writer throughout. Pt actively participated in a structured occupational therapy group involving a self-reflecting, group leisure task. Pt appeared comfortable sharing positive past experiences and personal information with peers, and was respectful in listening and responding to peers. Hyperverbal and tangential in responding to prompts, though she catches herself when she is rambling. She shared about a party that her family threw for her after all 6 of her kids graduated. Pleasant, cooperative, and engaged. Bright affect.

## 2020-10-20 NOTE — PROGRESS NOTES
10/19/20 1900   Music Therapy   Type of Participation Music therapy group   Response Participates independently;Participates with cues/redirection   Hours 1   Rosy effervescently participated in active music-making group this evening, playing keyboard and singing Holiday tunes (her preference).  Goals of session were self-expression and group cohesion.  Goals were met.  Rosy was redirectable when necessary and expressed great enjoyment of group.

## 2020-10-21 PROCEDURE — 99233 SBSQ HOSP IP/OBS HIGH 50: CPT | Performed by: PSYCHIATRY & NEUROLOGY

## 2020-10-21 PROCEDURE — 250N000013 HC RX MED GY IP 250 OP 250 PS 637: Performed by: PSYCHIATRY & NEUROLOGY

## 2020-10-21 PROCEDURE — 250N000013 HC RX MED GY IP 250 OP 250 PS 637: Performed by: NURSE PRACTITIONER

## 2020-10-21 PROCEDURE — 124N000003 HC R&B MH SENIOR/ADOLESCENT

## 2020-10-21 PROCEDURE — 250N000013 HC RX MED GY IP 250 OP 250 PS 637: Performed by: PHYSICIAN ASSISTANT

## 2020-10-21 PROCEDURE — G0177 OPPS/PHP; TRAIN & EDUC SERV: HCPCS

## 2020-10-21 RX ADMIN — Medication 1 CAPSULE: at 08:09

## 2020-10-21 RX ADMIN — OLANZAPINE 15 MG: 15 TABLET, FILM COATED ORAL at 20:51

## 2020-10-21 RX ADMIN — Medication 1 CAPSULE: at 20:51

## 2020-10-21 RX ADMIN — DIVALPROEX SODIUM 1000 MG: 500 TABLET, EXTENDED RELEASE ORAL at 20:50

## 2020-10-21 RX ADMIN — THERA TABS 1 TABLET: TAB at 08:09

## 2020-10-21 RX ADMIN — LITHIUM CARBONATE 450 MG: 450 TABLET, EXTENDED RELEASE ORAL at 08:08

## 2020-10-21 RX ADMIN — LITHIUM CARBONATE 600 MG: 300 TABLET, EXTENDED RELEASE ORAL at 20:50

## 2020-10-21 RX ADMIN — MELATONIN TAB 3 MG 6 MG: 3 TAB at 20:51

## 2020-10-21 ASSESSMENT — ACTIVITIES OF DAILY LIVING (ADL)
DRESS: INDEPENDENT
LAUNDRY: UNABLE TO COMPLETE
DRESS: INDEPENDENT
LAUNDRY: WITH SUPERVISION
HYGIENE/GROOMING: INDEPENDENT
ORAL_HYGIENE: INDEPENDENT
ORAL_HYGIENE: INDEPENDENT
HYGIENE/GROOMING: INDEPENDENT

## 2020-10-21 NOTE — PLAN OF CARE
Problem: OT General Care Plan  Goal: OT Goal 1  Description: Will attend OT groups improve concentration and motivation by engaging in more challenging and success oriented options while also improving insight with comments/answers made about mental health needs    Pt attended 2 out of 2 OT groups offered. Pt actively participated in occupational therapy clinic. Pt was able to ask for assistance as needed, and independently initiated a novel, detailed, goal-directed task. Pt demonstrated good attention to task and attention to detail. Social with peers and writer throughout. She shared that she is making projects to give to each of her children. She was observed laying her head on the table with her eyes closed towards the end of group, and appeared tired during groups today. Pleasant, cooperative, and engaged.

## 2020-10-21 NOTE — PROGRESS NOTES
10/20/20 [facility]   Therapeutic Recreation   Type of Intervention structured groups   Activity game   Response Participates, initiates socially appropriate   Hours 0.5     Pt attended the Therapeutic Recreation group with focus on leisure participation, communication skills, and critical thinking. Pt slightly participated in the group recreational activity via a group game. Pt chose not to take any turns and stated she did not like the guessing game. Pt interacted and contributed to the activity, adding clues for others. Pt presented as irritable and seemed disengaged when this writer was being accommodating another patient, as it delayed the process of the group.

## 2020-10-21 NOTE — PLAN OF CARE
Problem: General Plan of Care (Inpatient Behavioral)  Goal: Team Discussion  Description: Team Plan:  Outcome: No Change  Note: BEHAVIORAL TEAM DISCUSSION    Participants: Dr. Ciro Ley, Billie Erwin, RN, CAMILLE Rodrigues, Cely Sheldon, DERIAN  Progress: Some improvement   Anticipated length of stay: 10-12 days  Continued Stay Criteria/Rationale: Depression, Manic Symptoms   Medical/Physical: See medical notes  Precautions:   Behavioral Orders   Procedures    Code 1 - Restrict to Unit    Routine Programming     As clinically indicated    Status 15     Every 15 minutes.     Plan: Pt will discharge home once her mental health stabilizes  Rationale for change in precautions or plan: N/A

## 2020-10-21 NOTE — PROGRESS NOTES
Pt is somewhat less hyperverbal.  She was noted to have periods of quiet reading in the lounge.  Continues to need reminders about boundaries, but seems to be improving.  Denies depression / anxiety / SI.  No perseverative statements about past events.  Compliant with medication.

## 2020-10-21 NOTE — PROGRESS NOTES
"[facility], [place]   Psychiatric Progress Note        Interim History:   The patient's care was discussed with the treatment team during the daily team meeting and/or staff's chart notes were reviewed.  Staff report patient has been bright and awake. Still has poor boundaries and insight. Slept 6 hours.     The patient reports that she is \"better than I have been since August 17th.\" Says that at that time, she was \"going down the tubes.\" Reports good sleep and appetite. Denies SI or HI. Somewhat rambling and repetitive. Hopeful to discharge soon. Agreeable to meet with this provider and her  later via [employer].     Telepsych meeting with patient and her  Carloz. He feels she is improving. Asks about + [facility] and [facility]. Patient to follow with outpatient provider and therapist after discharge.          Medications:       divalproex sodium extended-release  1,000 mg Oral QPM     lactobacillus rhamnosus (GG)  1 capsule Oral BID     lithium ER  450 mg Oral Daily     lithium ER  600 mg Oral At Bedtime     melatonin  6 mg Oral At Bedtime     multivitamin, therapeutic  1 tablet Oral Daily     OLANZapine  15 mg Oral At Bedtime          Allergies:     Allergies   Allergen Reactions     Shellfish Allergy Anaphylaxis          Labs:     No results found for this or any previous visit (from the past 24 hour(s)).       Psychiatric Examination:     /83 (BP Location: Left arm)   Pulse 77   Temp 97.3  F (36.3  C) (Temporal)   Resp 16   Wt 86.3 kg (190 lb 3.2 oz)   LMP 12/07/2007   SpO2 97%   BMI 29.79 kg/m    Weight is 190 lbs 3.2 oz  Body mass index is 29.79 kg/m .  Orthostatic Vitals       Most Recent      Sitting Orthostatic /86 10/19 0800    Sitting Orthostatic Pulse (bpm) 79 10/19 0800    Standing Orthostatic /85 10/19 0800    Standing Orthostatic Pulse (bpm) 87 10/19 0800            Appearance: awake, alert and adequately groomed  Attitude:  " cooperative  Eye Contact:  good  Mood:  better  Affect:  mood congruent  Speech:  pressured speech and rambling but improving  Psychomotor Behavior:  no evidence of tardive dyskinesia, dystonia, or tics  Thought Process:  illogical  Associations:  no loose associations  Thought Content:  no evidence of suicidal ideation or homicidal ideation, no evidence of psychotic thought and obsessions present  Insight:  partial  Judgement:  fair  Oriented to:  time, person, and place  Attention Span and Concentration:  fair  Recent and Remote Memory:  fair    Clinical Global Impressions  First:  Considering your total clinical experience with this particular patient population, how severe are the patient's symptoms at this time?: 7 (10/05/20 1134)  Compared to the patient's condition at the START of treatment, this patient's condition is: 7 (10/05/20 1134)  Most recent:  Considering your total clinical experience with this particular patient population, how severe are the patient's symptoms at this time?: 7 (10/13/20 1219)  Compared to the patient's condition at the START of treatment, this patient's condition is: 7 (10/13/20 1219)         Precautions:     Behavioral Orders   Procedures     Code 1 - Restrict to Unit     Routine Programming     As clinically indicated     Status 15     Every 15 minutes.          Diagnoses:     Bipolar affective disorder, most recent episode christiano, severe, without psychosis.   UTI         Plan:     1) Continue Lithium 450mg Qam and 600mg at bedtime. Level was 0.54 on does of 900mg on 10/19. Will check level on Friday.   2) Continue VPA 1000mg Qday. Level was 74 on 10/13.   3) Continue Zyprexa 15mg at bedtime.   4) Scheduled melatonin.   5) Will coordinate disposition with patient's  Carloz.   6) Will refer patient to 55+ program. May refer her to [facility].   7) Possible discharge on Friday.       Disposition Plan   Reason for ongoing admission: is unable to care for self due to severe  psychosis or christiano  Discharge location: home with family  Discharge Medications: not ordered  Follow-up Appointments: not scheduled  Legal Status: voluntary    Entered by: Ciro Ley on 10/21/2020 at 1:12 PM

## 2020-10-21 NOTE — PROGRESS NOTES
Work Completed: Reviewed chart. Discussed pt in team meeting. Pt remains somewhat manic, however, is less so today. Met with pt to determine disposition. Pt will have meeting with her provider and her spouse today via [employer].    Discharge plan or goal: Discharge home                Barriers to discharge: Pt needs further mental health stabilization

## 2020-10-22 PROCEDURE — 124N000003 HC R&B MH SENIOR/ADOLESCENT

## 2020-10-22 PROCEDURE — 250N000013 HC RX MED GY IP 250 OP 250 PS 637: Performed by: PSYCHIATRY & NEUROLOGY

## 2020-10-22 PROCEDURE — 250N000013 HC RX MED GY IP 250 OP 250 PS 637: Performed by: PHYSICIAN ASSISTANT

## 2020-10-22 PROCEDURE — 250N000013 HC RX MED GY IP 250 OP 250 PS 637: Performed by: NURSE PRACTITIONER

## 2020-10-22 PROCEDURE — 99233 SBSQ HOSP IP/OBS HIGH 50: CPT | Mod: GT | Performed by: PSYCHIATRY & NEUROLOGY

## 2020-10-22 PROCEDURE — G0177 OPPS/PHP; TRAIN & EDUC SERV: HCPCS

## 2020-10-22 PROCEDURE — H2032 ACTIVITY THERAPY, PER 15 MIN: HCPCS

## 2020-10-22 RX ADMIN — MELATONIN TAB 3 MG 6 MG: 3 TAB at 21:25

## 2020-10-22 RX ADMIN — OLANZAPINE 15 MG: 15 TABLET, FILM COATED ORAL at 21:26

## 2020-10-22 RX ADMIN — Medication 1 CAPSULE: at 21:25

## 2020-10-22 RX ADMIN — LITHIUM CARBONATE 450 MG: 450 TABLET, EXTENDED RELEASE ORAL at 07:51

## 2020-10-22 RX ADMIN — Medication 1 CAPSULE: at 07:51

## 2020-10-22 RX ADMIN — DIVALPROEX SODIUM 1000 MG: 500 TABLET, EXTENDED RELEASE ORAL at 21:25

## 2020-10-22 RX ADMIN — LITHIUM CARBONATE 600 MG: 300 TABLET, EXTENDED RELEASE ORAL at 21:26

## 2020-10-22 RX ADMIN — THERA TABS 1 TABLET: TAB at 07:51

## 2020-10-22 ASSESSMENT — ACTIVITIES OF DAILY LIVING (ADL)
HYGIENE/GROOMING: INDEPENDENT
DRESS: INDEPENDENT
ORAL_HYGIENE: INDEPENDENT
ORAL_HYGIENE: INDEPENDENT
DRESS: INDEPENDENT
HYGIENE/GROOMING: INDEPENDENT
LAUNDRY: UNABLE TO COMPLETE

## 2020-10-22 NOTE — PROGRESS NOTES
"Pt was calm and pleasant today, attending groups and interacting with other patients in milieu. Of note, during check in pt was less tangential and manic presenting, showing conversational restraint and awareness. Pt showed insight, stating today I feel less \"foggy\". Pt stated feeling pretty good and has an interest in trying to discharge before the weekend. Pt stated they felt like weekends were fun but not productive. Pt denies anxiety and depression, SI, SIB, Hallucinations, and wishing to be dead.       10/21/20 2053   Psycho Education   Type of Intervention 1:1 intervention   Response participates, initiates socially appropriate   Hours 0.5   Treatment Detail check-in   Behavioral Health   Hallucinations denies / not responding to hallucinations   Thinking intact   Orientation person: oriented;place: oriented;date: oriented;time: oriented   Memory baseline memory   Insight insight appropriate to situation;insight appropriate to events   Judgement intact   Eye Contact at examiner   Affect full range affect   Mood mood is calm   Physical Appearance/Attire attire appropriate to age and situation   Hygiene well groomed   Suicidality other (see comments)  (denies)   1. Wish to be Dead (Recent) No   2. Non-Specific Active Suicidal Thoughts (Recent) No   Self Injury   (denies)   Activity other (see comment)  (active in groups and milieu)   Speech clear;coherent   Medication Sensitivity no observed side effects;no stated side effects   Psychomotor / Gait balanced;steady   Safety   Suicidality Status 15   Activities of Daily Living   Hygiene/Grooming independent   Oral Hygiene independent   Dress independent   Laundry with supervision   Room Organization independent     "

## 2020-10-22 NOTE — PLAN OF CARE
Problem: OT General Care Plan  Goal: OT Goal 1  Description: Will attend OT groups improve concentration and motivation by engaging in more challenging and success oriented options while also improving insight with comments/answers made about mental health needs    Pt attended 2 out of 2 OT groups offered. Pt actively participated in occupational therapy clinic. Pt was able to ask for assistance as needed, and independently returned to a creative expression task. Pt demonstrated good focus, planning, and attention to detail. Social with peers and writer throughout. Pt actively participated in a structured occupational therapy group with a focus on connecting song titles to life events and personal feelings. Using a list of song titles, pt identified All By Myself, I Can See Clearly Now, Can't Buy Me Love, Holdin' On To Yesterday, and [place] as song titles that relate to her life, as well as All She Wants To Do Is Dance and Time For Me To Fly as song titles that indicate something about her future. She was offered a visual scanning task, though chose to exercise/stretch while listening to selected music instead. She continues to be pleasant, cooperative, and engaged throughout all groups.

## 2020-10-22 NOTE — PLAN OF CARE
Problem: Manic Symptoms  Goal: Manic Symptoms  Description: Signs and symptoms of listed problems will be absent or manageable.  Outcome: Improving       Behavioral  Pt slept 6.25 hours overnight; Pt oriented x 4; Pt denied SI, SIB, HI, and hallucinations; Pt pleasant and cooperative upon approac ; Pt social with staff and peers;pt attended groups; pt compliant with medications and cares; Pt eating and drinking adequately; Pt affect bright; Pt minimally tangential and rambling and exhibits rapid speech at times, however is improved from admission.     Medical  Pt endorses no new medical complaints    Plan  Possible discharge 10/23/20

## 2020-10-22 NOTE — PROGRESS NOTES
"[facility], [place]   Psychiatric Progress Note        Interim History:   The patient's care was discussed with the treatment team during the daily team meeting and/or staff's chart notes were reviewed.  Staff report patient has been doing well. Less tangential. Slept 6.75 hours.     The patient reports that she is \"even better.\" Sleeping and eating well. Denies SI or HI. Would like her medications sent to [facility] in [place]. Looking forward to discharging tomorrow.          Medications:       divalproex sodium extended-release  1,000 mg Oral QPM     lactobacillus rhamnosus (GG)  1 capsule Oral BID     lithium ER  450 mg Oral Daily     lithium ER  600 mg Oral At Bedtime     melatonin  6 mg Oral At Bedtime     multivitamin, therapeutic  1 tablet Oral Daily     OLANZapine  15 mg Oral At Bedtime          Allergies:     Allergies   Allergen Reactions     Shellfish Allergy Anaphylaxis          Labs:     No results found for this or any previous visit (from the past 24 hour(s)).       Psychiatric Examination:     BP (!) 140/87   Pulse 78   Temp 97.7  F (36.5  C) (Temporal)   Resp 16   Wt 86.3 kg (190 lb 3.2 oz)   LMP 12/07/2007   SpO2 97%   BMI 29.79 kg/m    Weight is 190 lbs 3.2 oz  Body mass index is 29.79 kg/m .  Orthostatic Vitals       Most Recent      Sitting Orthostatic /86 10/19 0800    Sitting Orthostatic Pulse (bpm) 79 10/19 0800    Standing Orthostatic /85 10/19 0800    Standing Orthostatic Pulse (bpm) 87 10/19 0800            Appearance: awake, alert and adequately groomed  Attitude:  cooperative  Eye Contact:  good  Mood:  better  Affect:  mood congruent  Speech:  clear, coherent   Psychomotor Behavior:  no evidence of tardive dyskinesia, dystonia, or tics  Thought Process:  illogical  Associations:  no loose associations  Thought Content:  no evidence of suicidal ideation or homicidal ideation and no evidence of psychotic thought  Insight:  fair  Judgement:  " fair  Oriented to:  time, person, and place  Attention Span and Concentration:  intact  Recent and Remote Memory:  fair    Clinical Global Impressions  First:  Considering your total clinical experience with this particular patient population, how severe are the patient's symptoms at this time?: 7 (10/05/20 1134)  Compared to the patient's condition at the START of treatment, this patient's condition is: 7 (10/05/20 1134)  Most recent:  Considering your total clinical experience with this particular patient population, how severe are the patient's symptoms at this time?: 7 (10/13/20 1219)  Compared to the patient's condition at the START of treatment, this patient's condition is: 7 (10/13/20 1219)         Precautions:     Behavioral Orders   Procedures     Code 1 - Restrict to Unit     Routine Programming     As clinically indicated     Status 15     Every 15 minutes.          Diagnoses:     Bipolar affective disorder, most recent episode christiano, severe, without psychosis.   UTI         Plan:     1) Continue Lithium 450mg Qam and 600mg at bedtime. Level was 0.54 on does of 900mg on 10/19. Will check level on Friday.   2) Continue VPA 1000mg Qday. Level was 74 on 10/13.   3) Continue Zyprexa 15mg at bedtime.   4) Scheduled melatonin.   5) Will coordinate disposition with patient's  Carloz.   6) Will refer patient to 55+ program.   7) Possible discharge on Friday.     Addendum: Spoke to patient's  who says that patient called him and does not want anything to do with him for two weeks and is arranging another ride home. Discussed that patient would likely be best served by remaining in the hospital over the weekend for continued stabilization.       Disposition Plan   Reason for ongoing admission: is unable to care for self due to severe psychosis or christiano  Discharge location: home with family  Discharge Medications: not ordered  Follow-up Appointments: not scheduled  Legal Status: voluntary        Video-Visit Details    Type of service:  Video Visit    Video Start Time (time video started): 0922    Video End Time (time video stopped): 0927    Originating Location (pt. Location): Other Station 3B    Distant Location (provider location): Provider remote location    Mode of Communication:  Video Conference via [employer]    Physician has received verbal consent for a Video Visit from the patient? Yes      Entered by: Ciro Ley on 10/22/2020 at 12:03 PM

## 2020-10-22 NOTE — PLAN OF CARE
"48 hour nursing assessment:    Pt consumed 75% of her dinner, No complaints of discomfort. Pt described her mood as \"Feeling great. I need to go home, I'm a little apprehensive about what's gonna happen when I leave here. Sad because I made some friends here and I'll leave them behind\". Pt denied SI,SIB,HI and hallucinations. Rated her her depression and anxiety as 1/10.   Pt expressed that she has worked out a living arrangements with her . Stated, \"I'll live upstairs and he'll live downstairs just for a while. He just doesn't understand how he makes me nervous. We just need a little space for now. I have mental illness and I take of it\". Pt reported that she and her  have been  for 37 years and it'll be 38 years on November 20th.   Pt's goal is to go back home and figure things out on her own. \"I have to figure things out so I don't end up here again\". Pt indicated that her girlfriend who is also a neighbor will pick her up tomorrow at 1300 at the ED [place] area. Pt also wants her prescribed medications to be sent to [facility]  at [address] in [place]. Pt plans to  her medications via the drive-thru area. Pt mentioned that she is a functional adult and she is able to manage her own medications by herself.   Pt is pleasant and cooperative with instructions. Will continue to monitor pt's status and promote healthy coping skills.                           "

## 2020-10-22 NOTE — PROGRESS NOTES
Work Completed: Reviewed chart. Met with team to discuss pt progress. Pt will discharge tomorrow. Completed AVS. Met with pt to discuss disposition.    Discharge plan or goal: Discharge home                Barriers to discharge: Further stabilization is needed. Likely discharge tomorrow.

## 2020-10-23 LAB — LITHIUM SERPL-SCNC: 0.7 MMOL/L (ref 0.6–1.2)

## 2020-10-23 PROCEDURE — 250N000013 HC RX MED GY IP 250 OP 250 PS 637: Performed by: NURSE PRACTITIONER

## 2020-10-23 PROCEDURE — 36415 COLL VENOUS BLD VENIPUNCTURE: CPT | Performed by: PSYCHIATRY & NEUROLOGY

## 2020-10-23 PROCEDURE — H2032 ACTIVITY THERAPY, PER 15 MIN: HCPCS

## 2020-10-23 PROCEDURE — 124N000003 HC R&B MH SENIOR/ADOLESCENT

## 2020-10-23 PROCEDURE — 250N000013 HC RX MED GY IP 250 OP 250 PS 637: Performed by: PHYSICIAN ASSISTANT

## 2020-10-23 PROCEDURE — 250N000013 HC RX MED GY IP 250 OP 250 PS 637: Performed by: PSYCHIATRY & NEUROLOGY

## 2020-10-23 PROCEDURE — 99232 SBSQ HOSP IP/OBS MODERATE 35: CPT | Mod: GT | Performed by: PSYCHIATRY & NEUROLOGY

## 2020-10-23 PROCEDURE — 80178 ASSAY OF LITHIUM: CPT | Performed by: PSYCHIATRY & NEUROLOGY

## 2020-10-23 PROCEDURE — G0177 OPPS/PHP; TRAIN & EDUC SERV: HCPCS

## 2020-10-23 RX ADMIN — LITHIUM CARBONATE 450 MG: 450 TABLET, EXTENDED RELEASE ORAL at 09:24

## 2020-10-23 RX ADMIN — LITHIUM CARBONATE 600 MG: 300 TABLET, EXTENDED RELEASE ORAL at 21:13

## 2020-10-23 RX ADMIN — OLANZAPINE 15 MG: 15 TABLET, FILM COATED ORAL at 21:13

## 2020-10-23 RX ADMIN — Medication 1 CAPSULE: at 09:23

## 2020-10-23 RX ADMIN — THERA TABS 1 TABLET: TAB at 09:24

## 2020-10-23 RX ADMIN — MELATONIN TAB 3 MG 6 MG: 3 TAB at 21:12

## 2020-10-23 RX ADMIN — DIVALPROEX SODIUM 1000 MG: 500 TABLET, EXTENDED RELEASE ORAL at 21:13

## 2020-10-23 RX ADMIN — Medication 1 CAPSULE: at 21:13

## 2020-10-23 ASSESSMENT — ACTIVITIES OF DAILY LIVING (ADL)
ORAL_HYGIENE: INDEPENDENT
HYGIENE/GROOMING: INDEPENDENT
DRESS: STREET CLOTHES

## 2020-10-23 NOTE — PROGRESS NOTES
10/22/20 2200   General Information   Art Directive other (see comments)   AT directive is to draw an image of self as landscape using art media of pts choice. Goals of directive: to create a personal self narrative, to identify positive strengths and goals, emotional expression, to assess motivation for change.     Pt was an active participant, focused on task for the full duration of group. Pt shared an image of a mountain and river and shared that the mountain represents her  and the river represent herself. Pt shared that her and her  took several trips to [place] and enjoyed these natural features represented in her landscape drawing. Pt also took inspiration from a bible verse which references mountains and the ocean.  Pt was talkative at times and tangential but not distracting to group and able to return focus to artwork independently.  Pts mood was calm.

## 2020-10-23 NOTE — PLAN OF CARE
Problem: OT General Care Plan  Goal: OT Goal 1  Description: Will attend OT groups improve concentration and motivation by engaging in more challenging and success oriented options while also improving insight with comments/answers made about mental health needs    Pt attended 2 out of 2 OT groups offered. Pt actively participated in occupational therapy clinic. Pt was able to ask for assistance as needed, and independently returned to a creative expression task. Pt demonstrated good focus, planning, and attention to detail. Social with peers and writer throughout, and politely complimented peers on their projects. She continues to be pleasant, cooperative, and engaged throughout all groups.

## 2020-10-23 NOTE — PROGRESS NOTES
"[facility], [place]   Psychiatric Progress Note        Interim History:   The patient's care was discussed with the treatment team during the daily team meeting and/or staff's chart notes were reviewed.  Staff report patient has been doing better. Still hyperverbal but less anxious.     The patient reports that she is \"peachy.\" Says that she didn't sleep that well after she woke up early this morning because the bed isn't comfortable but feels rested. Eating well. Denies SI or HI. Discussed that she had arranged a ride with her girlfriend. Says that her  would need to bring her son along and this way it is only one person. Discussed the conversation with her  yesterday and that team feels she should further stabilize over the weekend. Possible discharge on Monday. Patient not pleased with this decision but agreeable. Asked if she could use her cell phone to text friends and relatives.          Medications:       divalproex sodium extended-release  1,000 mg Oral QPM     lactobacillus rhamnosus (GG)  1 capsule Oral BID     lithium ER  450 mg Oral Daily     lithium ER  600 mg Oral At Bedtime     melatonin  6 mg Oral At Bedtime     multivitamin, therapeutic  1 tablet Oral Daily     OLANZapine  15 mg Oral At Bedtime          Allergies:     Allergies   Allergen Reactions     Shellfish Allergy Anaphylaxis          Labs:     Recent Results (from the past 24 hour(s))   Lithium level    Collection Time: 10/23/20  6:51 AM   Result Value Ref Range    Lithium Level 0.70 0.60 - 1.20 mmol/L          Psychiatric Examination:     /86   Pulse 81   Temp 97.3  F (36.3  C) (Temporal)   Resp 16   Wt 86.3 kg (190 lb 3.2 oz)   [facility] 12/07/2007   SpO2 95%   BMI 29.79 kg/m    Weight is 190 lbs 3.2 oz  Body mass index is 29.79 kg/m .  Orthostatic Vitals       Most Recent      Sitting Orthostatic /86 10/19 0800    Sitting Orthostatic Pulse (bpm) 79 10/19 0800    Standing Orthostatic " /85 10/19 0800    Standing Orthostatic Pulse (bpm) 87 10/19 0800            Appearance: awake, alert and adequately groomed  Attitude:  cooperative  Eye Contact:  good  Mood:  better  Affect:  mood congruent  Speech:  clear, coherent   Psychomotor Behavior:  no evidence of tardive dyskinesia, dystonia, or tics  Thought Process:  illogical  Associations:  no loose associations  Thought Content:  no evidence of suicidal ideation or homicidal ideation and no evidence of psychotic thought  Insight:  fair  Judgement:  fair  Oriented to:  time, person, and place  Attention Span and Concentration:  intact  Recent and Remote Memory:  fair    Clinical Global Impressions  First:  Considering your total clinical experience with this particular patient population, how severe are the patient's symptoms at this time?: 7 (10/05/20 1134)  Compared to the patient's condition at the START of treatment, this patient's condition is: 7 (10/05/20 1134)  Most recent:  Considering your total clinical experience with this particular patient population, how severe are the patient's symptoms at this time?: 7 (10/13/20 1219)  Compared to the patient's condition at the START of treatment, this patient's condition is: 7 (10/13/20 1219)         Precautions:     Behavioral Orders   Procedures     Code 1 - Restrict to Unit     Routine Programming     As clinically indicated     Status 15     Every 15 minutes.          Diagnoses:     Bipolar affective disorder, most recent episode christiano, severe, without psychosis.   UTI         Plan:     1) Continue Lithium 450mg Qam and 600mg at bedtime. Level was 0.7 on this dose.   2) Continue VPA 1000mg Qday. Level was 74 on 10/13.   3) Continue Zyprexa 15mg at bedtime.   4) Scheduled melatonin.   5) Will coordinate disposition with patient's  Carloz.   6) Will refer patient to 55+ program.   7) Okay for patient to use cell phone once daily at discretion of charge nurse.   8) Possible discharge on  Monday.         Disposition Plan   Reason for ongoing admission: is unable to care for self due to severe psychosis or christiano  Discharge location: home with family  Discharge Medications: not ordered  Follow-up Appointments: not scheduled  Legal Status: voluntary       Video-Visit Details    Type of service:  Video Visit    Video Start Time (time video started): 0825    Video End Time (time video stopped): 0835    Originating Location (pt. Location): Other Station 3[facility]    Distant Location (provider location): Provider remote location    Mode of Communication:  Video Conference via [employer]    Physician has received verbal consent for a Video Visit from the patient? Yes      Entered by: Ciro Ley on 10/23/2020 at 8:56 AM

## 2020-10-23 NOTE — PLAN OF CARE
Nursing Assessment: Patient seemed to be making some progress. She was less hyperverbal, restless and anxious. Patient denied thoughts of hurting self or others. She presented as grandiose and with pressured speech. She asked whether she could get hold of the management to talk about getting patients the right food. She was upset  early in the shift that she didn't get to go home. She repeatedly questioned why her discharge was put on hold and what she did wrong to deserve it. Patient made some comments about her  not wanting her to go home. Patient was easily redirectable from negative thoughts. She took her medications with no issue. Staff will continue to monitor.

## 2020-10-23 NOTE — PROGRESS NOTES
Work Completed: Reviewed chart. Discussed pt during team. Plan was for pt to discharge today, however, pt's spouse and provider do not believe she is ready today. Pt may possibly discharge on Monday. Met with pt to discuss her feelings about not discharging today. Pt is upset, however, accepting of the news.    Discharge plan or goal: Discharge home                Barriers to discharge: Pt needs further stabiization

## 2020-10-24 PROCEDURE — 250N000013 HC RX MED GY IP 250 OP 250 PS 637: Performed by: NURSE PRACTITIONER

## 2020-10-24 PROCEDURE — 250N000013 HC RX MED GY IP 250 OP 250 PS 637: Performed by: PSYCHIATRY & NEUROLOGY

## 2020-10-24 PROCEDURE — 250N000013 HC RX MED GY IP 250 OP 250 PS 637: Performed by: PHYSICIAN ASSISTANT

## 2020-10-24 PROCEDURE — H2032 ACTIVITY THERAPY, PER 15 MIN: HCPCS

## 2020-10-24 PROCEDURE — 124N000003 HC R&B MH SENIOR/ADOLESCENT

## 2020-10-24 RX ADMIN — LITHIUM CARBONATE 450 MG: 450 TABLET, EXTENDED RELEASE ORAL at 08:49

## 2020-10-24 RX ADMIN — MELATONIN TAB 3 MG 6 MG: 3 TAB at 21:37

## 2020-10-24 RX ADMIN — THERA TABS 1 TABLET: TAB at 08:50

## 2020-10-24 RX ADMIN — LITHIUM CARBONATE 600 MG: 300 TABLET, EXTENDED RELEASE ORAL at 21:37

## 2020-10-24 RX ADMIN — DIVALPROEX SODIUM 1000 MG: 500 TABLET, EXTENDED RELEASE ORAL at 21:37

## 2020-10-24 RX ADMIN — OLANZAPINE 15 MG: 15 TABLET, FILM COATED ORAL at 21:37

## 2020-10-24 RX ADMIN — Medication 1 CAPSULE: at 21:37

## 2020-10-24 RX ADMIN — Medication 1 CAPSULE: at 08:49

## 2020-10-24 ASSESSMENT — ACTIVITIES OF DAILY LIVING (ADL)
HYGIENE/GROOMING: INDEPENDENT
ORAL_HYGIENE: INDEPENDENT
LAUNDRY: WITH SUPERVISION
DRESS: INDEPENDENT

## 2020-10-24 NOTE — PROGRESS NOTES
"   10/23/20 2100   Music Therapy   Type of Participation Music therapy group   Response Participates independently   Hours 1   Rosy participated in Music Therapy group this evening. She was \"bouncy\" at times, and more reflective at other moments, depending on what kind of music was being employed. She played rhythm shakers to the more upbeat songs.  Goals of session were: mood regulation, self-expression, socialization and relaxation.  Plan to offer group again tomorrow.    "

## 2020-10-24 NOTE — PROGRESS NOTES
Pt attended evening music therapy group as well as community meeting. Pt hyper verbal however was able to be redirected. Pt denied depression, anxiety and SI/SIB, and said she is doing well overall. Pt was upset early in the day that she was not able to discharge, however Pt said she was no longer upset. No other questions or concerns. Besides the hyperactivity, Pt did appear anxious at times, however was overall in good spirits.           10/23/20 2000   Behavioral Health   Hallucinations denies / not responding to hallucinations   Thinking poor concentration   Orientation time: oriented;date: oriented;place: oriented;person: oriented   Memory baseline memory   Insight poor   Judgement impaired   Affect full range affect   Mood mood is calm   Physical Appearance/Attire attire appropriate to age and situation   Hygiene   (adequate)   1. Wish to be Dead (Recent) No   2. Non-Specific Active Suicidal Thoughts (Recent) No   Elopement Statements about wanting to leave   Activity   (social)   Speech clear;coherent   Medication Sensitivity no stated side effects   Psychomotor / Gait balanced;steady   Activities of Daily Living   Hygiene/Grooming independent   Oral Hygiene independent   Dress street clothes   Room Organization independent

## 2020-10-25 PROCEDURE — 124N000003 HC R&B MH SENIOR/ADOLESCENT

## 2020-10-25 PROCEDURE — H2032 ACTIVITY THERAPY, PER 15 MIN: HCPCS

## 2020-10-25 PROCEDURE — 250N000013 HC RX MED GY IP 250 OP 250 PS 637: Performed by: PSYCHIATRY & NEUROLOGY

## 2020-10-25 PROCEDURE — 250N000013 HC RX MED GY IP 250 OP 250 PS 637: Performed by: PHYSICIAN ASSISTANT

## 2020-10-25 PROCEDURE — 250N000013 HC RX MED GY IP 250 OP 250 PS 637: Performed by: NURSE PRACTITIONER

## 2020-10-25 RX ADMIN — LITHIUM CARBONATE 600 MG: 300 TABLET, EXTENDED RELEASE ORAL at 21:20

## 2020-10-25 RX ADMIN — OLANZAPINE 15 MG: 15 TABLET, FILM COATED ORAL at 21:20

## 2020-10-25 RX ADMIN — LITHIUM CARBONATE 450 MG: 450 TABLET, EXTENDED RELEASE ORAL at 08:20

## 2020-10-25 RX ADMIN — DIVALPROEX SODIUM 1000 MG: 500 TABLET, EXTENDED RELEASE ORAL at 21:20

## 2020-10-25 RX ADMIN — MELATONIN TAB 3 MG 6 MG: 3 TAB at 21:20

## 2020-10-25 RX ADMIN — Medication 1 CAPSULE: at 21:20

## 2020-10-25 RX ADMIN — Medication 1 CAPSULE: at 08:20

## 2020-10-25 RX ADMIN — THERA TABS 1 TABLET: TAB at 08:20

## 2020-10-25 NOTE — PROGRESS NOTES
10/24/20 2000   Music Therapy   Type of Participation Music therapy group   Response Participates independently   Hours 1   Rosy participated in MT group tonight with goals of socialization and mood regulation.  She danced and stretched to the music (self-initiated), was bright and social with peers, calm and engaged.  She expressed gratitude for the group.

## 2020-10-25 NOTE — PROGRESS NOTES
10/24/20 2100   Behavioral Health   Hallucinations denies / not responding to hallucinations   Thinking distractable;poor concentration   Orientation time: oriented;date: oriented;place: oriented;person: oriented   Memory baseline memory   Insight poor   Judgement impaired   Eye Contact at examiner   Affect full range affect   Mood mood is calm   Physical Appearance/Attire attire appropriate to age and situation   Hygiene well groomed   Suicidality other (see comments)  (denies)   1. Wish to be Dead (Recent) No   2. Non-Specific Active Suicidal Thoughts (Recent) No   3. Active Sucidal Ideation with any Methods (Not Plan) Without Intent to Act (Recent) No   Self Injury other (see comment)  (denies)   Elopement   (none stated or observed)   Activity restless   Speech rambling   Medication Sensitivity no stated side effects;no observed side effects   Psychomotor / Gait balanced;steady   Psycho Education   Type of Intervention 1:1 intervention   Response participates, initiates socially appropriate   Hours 0.5   Treatment Detail check in   Activities of Daily Living   Hygiene/Grooming independent   Oral Hygiene independent   Dress independent   Laundry with supervision   Room Organization independent       Patient had a calm shift.    Patient did not require seclusion/restraints to manage behavior.    Rosy Jean did participate in groups and was visible in the milieu.      Pt ate all meals and snacks provided. Pt did not shower this shift. Pt was seen approprietly interaction with peers in the milieu, in addition to attended all group. Pt denied SI, SIB, and HI. Pt explains that the commotion early this morning caused her anxiety to flare, placing it at a 4.5/10 (0 min, 10 max) She placed her depression at a 3/10 (0 min, 10 max)

## 2020-10-26 VITALS
WEIGHT: 198.4 LBS | OXYGEN SATURATION: 98 % | RESPIRATION RATE: 16 BRPM | TEMPERATURE: 97.5 F | HEART RATE: 88 BPM | SYSTOLIC BLOOD PRESSURE: 139 MMHG | DIASTOLIC BLOOD PRESSURE: 82 MMHG | BODY MASS INDEX: 31.07 KG/M2

## 2020-10-26 PROCEDURE — 250N000013 HC RX MED GY IP 250 OP 250 PS 637: Performed by: PSYCHIATRY & NEUROLOGY

## 2020-10-26 PROCEDURE — 250N000013 HC RX MED GY IP 250 OP 250 PS 637: Performed by: PHYSICIAN ASSISTANT

## 2020-10-26 PROCEDURE — G0177 OPPS/PHP; TRAIN & EDUC SERV: HCPCS

## 2020-10-26 PROCEDURE — 250N000013 HC RX MED GY IP 250 OP 250 PS 637: Performed by: NURSE PRACTITIONER

## 2020-10-26 PROCEDURE — 99239 HOSP IP/OBS DSCHRG MGMT >30: CPT | Mod: GT | Performed by: PSYCHIATRY & NEUROLOGY

## 2020-10-26 RX ORDER — DIVALPROEX SODIUM 500 MG/1
1000 TABLET, EXTENDED RELEASE ORAL EVERY EVENING
Qty: 60 TABLET | Refills: 1 | Status: SHIPPED | OUTPATIENT
Start: 2020-10-26 | End: 2021-07-01 | Stop reason: ALTCHOICE

## 2020-10-26 RX ORDER — LITHIUM CARBONATE 450 MG
450 TABLET, EXTENDED RELEASE ORAL DAILY
Qty: 30 TABLET | Refills: 1 | Status: SHIPPED | OUTPATIENT
Start: 2020-10-26 | End: 2021-07-01 | Stop reason: ALTCHOICE

## 2020-10-26 RX ORDER — OLANZAPINE 15 MG/1
15 TABLET ORAL AT BEDTIME
Qty: 30 TABLET | Refills: 1 | Status: SHIPPED | OUTPATIENT
Start: 2020-10-26 | End: 2021-07-01 | Stop reason: ALTCHOICE

## 2020-10-26 RX ORDER — LITHIUM CARBONATE 300 MG/1
600 TABLET, FILM COATED, EXTENDED RELEASE ORAL AT BEDTIME
Qty: 60 TABLET | Refills: 1 | Status: SHIPPED | OUTPATIENT
Start: 2020-10-26 | End: 2021-07-01 | Stop reason: ALTCHOICE

## 2020-10-26 RX ORDER — TRAZODONE HYDROCHLORIDE 50 MG/1
50-100 TABLET, FILM COATED ORAL
Qty: 60 TABLET | Refills: 1 | Status: SHIPPED | OUTPATIENT
Start: 2020-10-26 | End: 2021-02-08

## 2020-10-26 RX ORDER — LACTOBACILLUS RHAMNOSUS GG 10B CELL
1 CAPSULE ORAL 2 TIMES DAILY
Qty: 60 CAPSULE | Refills: 0 | Status: SHIPPED | OUTPATIENT
Start: 2020-10-26 | End: 2020-10-26

## 2020-10-26 RX ORDER — LANOLIN ALCOHOL/MO/W.PET/CERES
6 CREAM (GRAM) TOPICAL AT BEDTIME
Qty: 60 TABLET | Refills: 1 | Status: SHIPPED | OUTPATIENT
Start: 2020-10-26

## 2020-10-26 RX ADMIN — Medication 1 CAPSULE: at 08:10

## 2020-10-26 RX ADMIN — LITHIUM CARBONATE 450 MG: 450 TABLET, EXTENDED RELEASE ORAL at 08:10

## 2020-10-26 RX ADMIN — THERA TABS 1 TABLET: TAB at 08:10

## 2020-10-26 ASSESSMENT — ACTIVITIES OF DAILY LIVING (ADL)
ORAL_HYGIENE: INDEPENDENT
HYGIENE/GROOMING: INDEPENDENT
DRESS: INDEPENDENT;STREET CLOTHES

## 2020-10-26 NOTE — PLAN OF CARE
"Pt is calm and cooperative.  She is less intrusive with peers.  Her speech continues to be rambling, but is less so.  Reports feeling hopeful that she will discharge tomorrow.  Stats she feels good about staying the weekend  because the \"trauma tree\" group she attended on Friday afternoon was \"the best mental health group I have ever attended.\"   "

## 2020-10-26 NOTE — PROGRESS NOTES
10/25/20 2000   Therapeutic Recreation   Type of Intervention structured groups   Activity game   Response Participates, initiates socially appropriate   Hours 1     Pt attended the structured Therapeutic Recreation group, participating in a group activity. Pt participated in group discussion, leisure participation, and social engagement to gain self-esteem, manage behaviors, improve social skills, decrease isolation, and reduce anxiety/depression.   Pt was cooperative in the group activity, though she often seemed distracted by others and was disengaged in the game. Pt was looking for music to be played during the group, even though it was explained in prior TR groups that this writer did not have any equipment for music to be played, and then requested the use of staff computer or cell phone to play music for the remainder of the activity. Pt stated she enjoyed the MT group recently and seems to only be interested in that outlet while in a TR group.

## 2020-10-26 NOTE — DISCHARGE INSTRUCTIONS
Behavioral Discharge Planning and Instructions      Summary:  You were admitted on 10/2/2020  due to Bipolar I Disorder, Most Recent Episode mixed.  You were treated by BETTIE Brambila DNP and discharged on 10/26/2020 from Unit 3B to Home      Principal Diagnosis:  Bipolar affective disorder, most recent episode christiano, severe, without psychosis.       Health Care Follow-up Appointments:   PCP:   Yina Ham  [facility]   [address] [place] [address] [place],   [place] [address]  Phone: [phone]      Medication management:   Kayode Bennett NP - Thursday, November 5th at 10:30am. This is a virtual visit.  [address],  [place] [address]  Phone: [phone]    Therapist:   Rachel Norton -  Friday, October 30th at 1pm. This is a telehealth visit.  [facility]  [address],   [place] [address]  Phone: [phone]    You will receive a call from a pharmacist on  Friday, October 30th at 3pm to discuss your medications    Attend all scheduled appointments with your outpatient providers. Call at least 24 hours in advance if you need to reschedule an appointment to ensure continued access to your outpatient providers.   Lifestyle Adjustment:   1. Adjust your lifestyle to get enough sleep, relaxation, exercise and good nutrition.  Continue to develop healthy coping skills to decrease stress and promote a healthy  lifestyle.  2. Abstain from all substances of abuse.  3. Take medications as prescribed.  Please work with your doctor to discuss any concerns you have with your medications or side effects you may be experiencing.  4. Follow up with appointments as scheduled.      General Medication Instructions:   1. See your medication sheet(s) for instructions.   2. Take all medicines as directed.  Make no changes unless your doctor suggests them.   3. Go to all your doctor visits.  4. Be sure to have all your required lab tests. This way, your medicines can be refilled on time.  5.  Do not use any drugs not prescribed by your doctor.  Major Treatments, Procedures and Findings:  You were provided with: a psychiatric assessment, assessed for medical stability, medication evaluation and/or management, milieu management and medical interventions    Symptoms to Report: feeling more aggressive, increased confusion, losing more sleep, mood getting worse or thoughts of suicide    Early warning signs can include: increased depression or anxiety sleep disturbances increased thoughts or behaviors of suicide or self-harm  increased unusual thinking, such as paranoia or hearing voices    Safety and Wellness:  Take all medicines as directed.  Make no changes unless your doctor suggests them.      Follow treatment recommendations.  Refrain from alcohol and non-prescribed drugs.  Ask your support system to help you reduce your access to items that could harm yourself or others. If there is a concern for safety, call 911.    Resources:   Crisis Intervention: 399.558.9667 or 340-088-6852 (TTY: 652.786.6178).  Call anytime for help.  National [place] on Mental Illness (www.[place].os[place]do.org): 360.933.2657 or 216-571-8514.  Suicide Awareness Voices of Education (SAVE) (www.save.org): 507-288-HHEC (7302)  National Suicide Prevention Line (www.mentalhealthmn.org): 713-844-MIWS (0356)  Mental Health Consumer/Survivor Network of [place] (www.mhcsn.net): 628.245.7300 or 518-541-8343  Mental Health Association of [place] (www.mentalhealth.org): 201.700.8585 or 772-443-4142  Self- Management and Recovery Training., SMART-- Toll free: [phone]  [email or web address]  [facility] Crisis (COPE) Response - Adult [phone]      The treatment team has appreciated the opportunity to work with you.  If you have questions, you can call the unit at [phone].  Rosy,  please take care and make your recovery a daily recovery. If you would like to obtain any specific documentation regarding your hospitalization after your discharge,  contact [facility] of Information/Medical Records at: [phone].

## 2020-10-26 NOTE — PROGRESS NOTES
Work Completed:Received voice message from pt's spouse, Carloz. Returned call and left voice message.  Update: Spoke with pt's spouse to inform him that pt will discharge today at 1pm with a friend,. He was agreeable with the plan.    Discharge plan or goal: Discharge home                Barriers to discharge: Plan is for pt to discharge home today.
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
## 2020-10-30 ENCOUNTER — TELEPHONE (OUTPATIENT)
Dept: PHARMACY | Facility: CLINIC | Age: 65
End: 2020-10-30

## 2020-10-30 NOTE — TELEPHONE ENCOUNTER
Called patient x2 for schedule MTM transitions of care phone visit. No answer x2. Unable to leave voicemail as mailbox was full.  Unable to leave message with alternative number.    Casper Falk, IrinaD, BannerCP  Medication Therapy Management Pharmacist  Pager: [phone]

## 2020-11-05 ENCOUNTER — HOSPITAL ENCOUNTER (OUTPATIENT)
Dept: BEHAVIORAL HEALTH | Facility: CLINIC | Age: 65
End: 2020-11-05
Attending: FAMILY MEDICINE
Payer: COMMERCIAL

## 2020-11-05 PROCEDURE — 999N000216 HC STATISTIC ADULT CD FACE TO FACE-NO CHRG: Mod: TEL | Performed by: SOCIAL WORKER

## 2020-11-05 NOTE — PROGRESS NOTES
Patient checked in for her appointment today. Patient was not aware of time commitment for the program. She reports she has too many things going on in her life right now.  She reports she would rather start in January after the holidays.  She reports she has been in day treatment two times in the past and does not feel she needs this but her  and children would like her to attend.  She asked if she could complete the assessment today but start in January.  Writer explained she would likely need an update to the DA if she waits to attend.  Patient decided to reschedule for a later date.  Writer provided her with the number to call Intake to reschedule if she decides to attend at a later date.       April Cardenas, [facility]  [facility]  Phone: [phone]

## 2021-01-04 ENCOUNTER — OFFICE VISIT (OUTPATIENT)
Dept: FAMILY MEDICINE | Facility: CLINIC | Age: 66
End: 2021-01-04
Payer: COMMERCIAL

## 2021-01-04 VITALS
HEART RATE: 102 BPM | WEIGHT: 201 LBS | DIASTOLIC BLOOD PRESSURE: 79 MMHG | BODY MASS INDEX: 33.49 KG/M2 | SYSTOLIC BLOOD PRESSURE: 134 MMHG | TEMPERATURE: 98.8 F | OXYGEN SATURATION: 98 % | HEIGHT: 65 IN

## 2021-01-04 DIAGNOSIS — F31.63 BIPOLAR 1 DISORDER, MIXED, SEVERE (H): Primary | ICD-10-CM

## 2021-01-04 DIAGNOSIS — R63.5 WEIGHT GAIN: ICD-10-CM

## 2021-01-04 DIAGNOSIS — R21 RASH: ICD-10-CM

## 2021-01-04 LAB
ALBUMIN SERPL-MCNC: 4.1 G/DL (ref 3.4–5)
ALP SERPL-CCNC: 87 U/L (ref 40–150)
ALT SERPL W P-5'-P-CCNC: 40 U/L (ref 0–50)
ANION GAP SERPL CALCULATED.3IONS-SCNC: 6 MMOL/L (ref 3–14)
AST SERPL W P-5'-P-CCNC: 20 U/L (ref 0–45)
BILIRUB SERPL-MCNC: 0.2 MG/DL (ref 0.2–1.3)
BUN SERPL-MCNC: 23 MG/DL (ref 7–30)
CALCIUM SERPL-MCNC: 9.6 MG/DL (ref 8.5–10.1)
CHLORIDE SERPL-SCNC: 107 MMOL/L (ref 94–109)
CO2 SERPL-SCNC: 26 MMOL/L (ref 20–32)
CREAT SERPL-MCNC: 0.71 MG/DL (ref 0.52–1.04)
GFR SERPL CREATININE-BSD FRML MDRD: >90 ML/MIN/{1.73_M2}
GLUCOSE SERPL-MCNC: 98 MG/DL (ref 70–99)
LITHIUM SERPL-SCNC: <0.2 MMOL/L (ref 0.6–1.2)
POTASSIUM SERPL-SCNC: 3.8 MMOL/L (ref 3.4–5.3)
PROT SERPL-MCNC: 7.9 G/DL (ref 6.8–8.8)
SODIUM SERPL-SCNC: 139 MMOL/L (ref 133–144)
TSH SERPL DL<=0.005 MIU/L-ACNC: 1.7 MU/L (ref 0.4–4)
VALPROATE SERPL-MCNC: <3 MG/L (ref 50–100)

## 2021-01-04 PROCEDURE — 99203 OFFICE O/P NEW LOW 30 MIN: CPT | Performed by: FAMILY MEDICINE

## 2021-01-04 PROCEDURE — 80164 ASSAY DIPROPYLACETIC ACD TOT: CPT | Performed by: FAMILY MEDICINE

## 2021-01-04 PROCEDURE — 80178 ASSAY OF LITHIUM: CPT | Performed by: FAMILY MEDICINE

## 2021-01-04 PROCEDURE — 80053 COMPREHEN METABOLIC PANEL: CPT | Performed by: FAMILY MEDICINE

## 2021-01-04 PROCEDURE — 84443 ASSAY THYROID STIM HORMONE: CPT | Performed by: FAMILY MEDICINE

## 2021-01-04 PROCEDURE — 36415 COLL VENOUS BLD VENIPUNCTURE: CPT | Performed by: FAMILY MEDICINE

## 2021-01-04 ASSESSMENT — MIFFLIN-ST. JEOR: SCORE: 1467.61

## 2021-01-04 NOTE — PROGRESS NOTES
"  Assessment & Plan     Bipolar 1 disorder, mixed, severe (H)  Low levels of valproic acid and lithium.  Management with psych planned.  Encouraged her to continue her medication.   - Valproic acid  - Lithium level  - Comprehensive metabolic panel (BMP + Alb, Alk Phos, ALT, AST, Total. Bili, TP)  - TSH with free T4 reflex    Rash  No cause for rash on testing  Oral antihistamine for itching recommended.      Weight gain  ?related to change of activities and isolation related to recent hospitalization and manic episode.      Review of external notes as documented above           42 minutes spent on the date of the encounter doing chart review, history and exam, documentation and further activities as noted above         BMI:   Estimated body mass index is 33.45 kg/m  as calculated from the following:    Height as of this encounter: 1.651 m (5' 5\").    Weight as of this encounter: 91.2 kg (201 lb).   Weight management plan: Discussed healthy diet and exercise guidelines      Patient Instructions   You can use zyrtec 10 mg daily for the rash/itching symptoms.    Continue the exercises at home.      I will send you the kit for colon cancer screening when available.    Mammogram due now.  Follow up at [facility] as planned.    Labs today.        Return in about 6 months (around 7/4/2021) for Physical Exam.    Yina Ham MD  [facility]     Rosy Jean is a 65 year old female who presents to clinic today for the following health issues     HPI     Patient presents to discuss what she believes is a reaction to her medications.     Concern - Mental Concerns   Onset: 10 weeks   Description: Patient having the following symptoms, headaches, went to chiropractor for acupuncture treatment with relief - 70% better.     Patient has been having diarrhea- resolved, shivering and sweating while she's going to the bathroom. Rash broken out on hands and all over body. Using " "coconut oil for itching twice a day to three times a day.    Intensity: mild to moderate  Progression of Symptoms:  intermittent  Therapies tried and outcome: Also stopped taking Lithium 450mg       Hospital Follow-up Visit:    Hospital/Nursing Home/IP Rehab Facility: [facility]  Date of Admission: 10/2/2020  Date of Discharge: 10/26/2020  Reason(s) for Admission: Bipolar affective disorder, most recent episode christiano, severe, without psychosis.          Was your hospitalization related to COVID-19? No   Problems taking medications regularly:  Does not always take medication as directed.  Medication changes since discharge: stopped the Lithium 450 mg dose (continues on the 600 mg at night )  Problems adhering to non-medication therapy:  Does not want to continue current medications.    Summary of hospitalization:  [facility] discharge summary reviewed  Diagnostic Tests/Treatments reviewed.  Follow up needed: hepatic panel.  Other Healthcare Providers Involved in Patient s Care:         Specialist appointment - Psychiatrist, Psychology  Update since discharge: fluctuating course.       Post Discharge Medication Reconciliation: discharge medications reconciled and changed, per note/orders.  Plan of care communicated with patient                Review of Systems   Constitutional, HEENT, cardiovascular, pulmonary, gi and gu systems are negative, except as otherwise noted.      Objective    /79 (BP Location: Left arm, Patient Position: Chair, Cuff Size: Adult Large)   Pulse 102   Temp 98.8  F (37.1  C) (Oral)   Ht 1.651 m (5' 5\")   Wt 91.2 kg (201 lb)   LMP 12/07/2007   SpO2 98%   BMI 33.45 kg/m    Body mass index is 33.45 kg/m .  Physical Exam   GENERAL: alert, no distress and over weight  NECK: no adenopathy, no asymmetry, masses, or scars and thyroid normal to palpation  RESP: lungs clear to auscultation - no rales, rhonchi or wheezes  CV: regular rate and rhythm, normal S1 S2, " no S3 or S4, no murmur, click or rub, no peripheral edema and peripheral pulses strong  ABDOMEN: soft, nontender, no hepatosplenomegaly, no masses and bowel sounds normal  MS: no gross musculoskeletal defects noted, no edema  SKIN: scattered rash to anterior chest and back.  Mild excoriations, no secondary infection noted.   NEURO: Normal strength and tone, mentation intact and speech normal  BACK: no CVA tenderness, no paralumbar tenderness  PSYCH: mentation appears normal, speech pressured, appearance well groomed and mild flight of ideas  LYMPH: no cervical, supraclavicular, axillary, or inguinal adenopathy    Results for orders placed or performed in visit on 01/04/21   Valproic acid     Status: Abnormal   Result Value Ref Range    Valproic Acid Level <3 (L) 50 - 100 mg/L   Lithium level     Status: Abnormal   Result Value Ref Range    Lithium Level <0.20 (L) 0.60 - 1.20 mmol/L   Comprehensive metabolic panel (BMP + Alb, Alk Phos, ALT, AST, Total. Bili, TP)     Status: None   Result Value Ref Range    Sodium 139 133 - 144 mmol/L    Potassium 3.8 3.4 - 5.3 mmol/L    Chloride 107 94 - 109 mmol/L    Carbon Dioxide 26 20 - 32 mmol/L    Anion Gap 6 3 - 14 mmol/L    Glucose 98 70 - 99 mg/dL    Urea Nitrogen 23 7 - 30 mg/dL    Creatinine 0.71 0.52 - 1.04 mg/dL    GFR Estimate >90 >60 mL/min/[1.73_m2]    GFR Estimate If Black >90 >60 mL/min/[1.73_m2]    Calcium 9.6 8.5 - 10.1 mg/dL    Bilirubin Total 0.2 0.2 - 1.3 mg/dL    Albumin 4.1 3.4 - 5.0 g/dL    Protein Total 7.9 6.8 - 8.8 g/dL    Alkaline Phosphatase 87 40 - 150 U/L    ALT 40 0 - 50 U/L    AST 20 0 - 45 U/L   TSH with free T4 reflex     Status: None   Result Value Ref Range    TSH 1.70 0.40 - 4.00 mU/L

## 2021-01-04 NOTE — PATIENT INSTRUCTIONS
You can use zyrtec 10 mg daily for the rash/itching symptoms.    Continue the exercises at home.      I will send you the kit for colon cancer screening when available.    Mammogram due now.  Follow up at [facility] as planned.    Labs today.

## 2021-01-05 ASSESSMENT — ANXIETY QUESTIONNAIRES
2. NOT BEING ABLE TO STOP OR CONTROL WORRYING: SEVERAL DAYS
GAD7 TOTAL SCORE: 5
3. WORRYING TOO MUCH ABOUT DIFFERENT THINGS: SEVERAL DAYS
IF YOU CHECKED OFF ANY PROBLEMS ON THIS QUESTIONNAIRE, HOW DIFFICULT HAVE THESE PROBLEMS MADE IT FOR YOU TO DO YOUR WORK, TAKE CARE OF THINGS AT HOME, OR GET ALONG WITH OTHER PEOPLE: SOMEWHAT DIFFICULT
6. BECOMING EASILY ANNOYED OR IRRITABLE: SEVERAL DAYS
5. BEING SO RESTLESS THAT IT IS HARD TO SIT STILL: NOT AT ALL
7. FEELING AFRAID AS IF SOMETHING AWFUL MIGHT HAPPEN: SEVERAL DAYS
1. FEELING NERVOUS, ANXIOUS, OR ON EDGE: SEVERAL DAYS

## 2021-01-05 ASSESSMENT — PATIENT HEALTH QUESTIONNAIRE - PHQ9
5. POOR APPETITE OR OVEREATING: NOT AT ALL
SUM OF ALL RESPONSES TO PHQ QUESTIONS 1-9: 4

## 2021-01-06 ASSESSMENT — ANXIETY QUESTIONNAIRES: GAD7 TOTAL SCORE: 5

## 2021-01-06 NOTE — RESULT ENCOUNTER NOTE
Please print letter and attach results.  PSK      Attached you will find a copy of your recent laboratory report.  Your Lithium level is low - I do not think this is giving you any side effects.  Your Depakote (valproic acid) level is low.  I don't think this is causing side effects of the itching either.  Your thyroid testing, blood sugar, liver and kidney testing are normal.  Please call or MyChart message me if you have any questions.    Sincerely,         Yina Ham MD

## 2021-02-08 ENCOUNTER — TELEPHONE (OUTPATIENT)
Dept: FAMILY MEDICINE | Facility: CLINIC | Age: 66
End: 2021-02-08

## 2021-02-08 ENCOUNTER — VIRTUAL VISIT (OUTPATIENT)
Dept: FAMILY MEDICINE | Facility: CLINIC | Age: 66
End: 2021-02-08
Payer: COMMERCIAL

## 2021-02-08 DIAGNOSIS — R26.89 BALANCE PROBLEM: ICD-10-CM

## 2021-02-08 DIAGNOSIS — R60.9 SWELLING: Primary | ICD-10-CM

## 2021-02-08 DIAGNOSIS — R63.5 WEIGHT GAIN: ICD-10-CM

## 2021-02-08 DIAGNOSIS — F31.63 BIPOLAR 1 DISORDER, MIXED, SEVERE (H): ICD-10-CM

## 2021-02-08 DIAGNOSIS — L29.9 ITCHING: ICD-10-CM

## 2021-02-08 PROCEDURE — 99215 OFFICE O/P EST HI 40 MIN: CPT | Mod: GT | Performed by: FAMILY MEDICINE

## 2021-02-08 NOTE — PATIENT INSTRUCTIONS
Call and schedule lab appointment.    Follow-up with your psychiatric nurse practitioner as planned.

## 2021-02-08 NOTE — PROGRESS NOTES
Rosy is a 65 year old who is being evaluated via a billable video visit.      How would you like to obtain your AVS? Mail a copy  If the video visit is dropped, the invitation should be resent by: Text to cell phone: x  [phone]   Will anyone else be joining your video visit? No    Video Start Time: changed to telephone visit - unable to complete video portion due to lack of technology on patient part.  Assessment & Plan     Jayna reports a variety of symptoms that are fairly nonspecific for her.  Reviewed laboratory testing done recently and will not repeat the majority of that testing.  With recent description of swelling labs are ordered as noted.    She is encouraged to follow-up with her psychiatric nurse practitioner for medication evaluation as she is tending toward manic symptoms during our conversation.    Swelling  Lab testing as ordered  - CBC with platelets and differential; Future  - BNP-N terminal pro; Future  - *UA reflex to Microscopic and Culture ([place] [facility] (except [place] and [place]); Future  - Heavy metals urine; Future    Itching  As above  - CBC with platelets and differential; Future  - Heavy metals urine; Future    Weight gain  As above  - **Comprehensive metabolic panel FUTURE anytime; Future  - **A1C FUTURE anytime; Future    Balance problem  As above  - Heavy metals urine; Future    Bipolar 1 disorder, mixed, severe (H)  Follow-up with nurse practitioner recommended she reports that she will call her when you are off the phone.        43 minutes spent on the date of the encounter doing chart review, review of test results, patient visit and documentation        Patient Instructions   Call and schedule lab appointment.    Follow-up with your psychiatric nurse practitioner as planned.      Return in about 1 week (around 2/15/2021) for as needed for persistent symptoms.    Yina Ham MD  [facility]     Rosy is a 65 year  old who presents to clinic today for the following health issues     HPI   Sicker and sicker by day - feet swollen, eating well.  Finger swelling.    No rash noted.  Continues to itch - but attributes to hard water.    Talked to poison control - ill feeling.  Fallen down, up stairs.  Coughing sensation.    Jan 25th exposed to some fragrances that have contributed to some symptoms she thinks.   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 Did not have headache after removal from home.    New stove from [employer].  Stove and  - made of nickel and she is allergic.  She thinks she has been exposed to some metal that is causing symptoms.  Requesting lab testing for evaluation.     Grief and anxiety symptoms.  Loss of multiple acquaintances/family members in last 1-2 years.    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      Bipolar Disorder I -  Ongoing care with psych NP - planning to scheduled follow up with her today.            Review of Systems   Constitutional, HEENT, cardiovascular, pulmonary, gi and gu systems are negative, except as otherwise noted.      Objective           Vitals:  No vitals were obtained today due to virtual visit.    Physical Exam   GENERAL: Healthy, alert and no distress  RESP: No audible wheeze, cough, or visible cyanosis.    PSYCH: mentation appears normal and speech pressured  Unable to do remainder of exam due to  Telephone visit.  Office Visit on 01/04/2021   Component Date Value Ref Range Status     Valproic Acid Level 01/04/2021 <3* 50 - 100 mg/L Final     Lithium Level 01/04/2021 <0.20* 0.60 - 1.20 mmol/L Final     Sodium 01/04/2021 139  133 - 144 mmol/L Final     Potassium 01/04/2021 3.8  3.4 - 5.3 mmol/L Final     Chloride 01/04/2021 107  94 - 109 mmol/L Final     Carbon Dioxide 01/04/2021 26  20 - 32 mmol/L Final     Anion Gap 01/04/2021 6  3 - 14 mmol/L Final     Glucose 01/04/2021 98  70 - 99 mg/dL Final     Non Fasting     Urea Nitrogen 01/04/2021 23  7 - 30 mg/dL Final     Creatinine 01/04/2021 0.71  0.52 - 1.04 mg/dL Final     GFR Estimate 01/04/2021 >90  >60 mL/min/[1.73_m2] Final    Comment: Non  GFR Calc  Starting 12/18/2018, serum creatinine based estimated GFR (eGFR) will be   calculated using the Chronic Kidney Disease Epidemiology Collaboration   (CKD-EPI) equation.       GFR Estimate If Black 01/04/2021 >90  >60 mL/min/[1.73_m2] Final    Comment:  GFR Calc  Starting 12/18/2018, serum creatinine based estimated GFR (eGFR) will be   calculated using the Chronic Kidney Disease Epidemiology Collaboration   (CKD-EPI) equation.       Calcium 01/04/2021 9.6  8.5 - 10.1 mg/dL Final     Bilirubin Total 01/04/2021 0.2  0.2 - 1.3 mg/dL Final     Albumin 01/04/2021 4.1  3.4 - 5.0 g/dL Final     Protein Total 01/04/2021 7.9  6.8 - 8.8 g/dL Final     Alkaline Phosphatase 01/04/2021 87  40 - 150 U/L Final     ALT 01/04/2021 40  0 - 50 U/L Final     AST 01/04/2021 20  0 - 45 U/L Final     TSH 01/04/2021 1.70  0.40 - 4.00 mU/L Final               Video-Visit Details    Type of service: telephone    Duration of telephone visit:  31 min.

## 2021-02-09 ENCOUNTER — MEDICAL CORRESPONDENCE (OUTPATIENT)
Dept: HEALTH INFORMATION MANAGEMENT | Facility: CLINIC | Age: 66
End: 2021-02-09

## 2021-02-09 DIAGNOSIS — L29.9 ITCHING: ICD-10-CM

## 2021-02-09 DIAGNOSIS — R63.5 WEIGHT GAIN: ICD-10-CM

## 2021-02-09 DIAGNOSIS — R60.9 SWELLING: ICD-10-CM

## 2021-02-09 LAB
ALBUMIN SERPL-MCNC: 3.6 G/DL (ref 3.4–5)
ALBUMIN UR-MCNC: NEGATIVE MG/DL
ALP SERPL-CCNC: 86 U/L (ref 40–150)
ALT SERPL W P-5'-P-CCNC: 71 U/L (ref 0–50)
AMORPH CRY #/AREA URNS HPF: ABNORMAL /HPF
ANION GAP SERPL CALCULATED.3IONS-SCNC: 2 MMOL/L (ref 3–14)
APPEARANCE UR: ABNORMAL
AST SERPL W P-5'-P-CCNC: 30 U/L (ref 0–45)
BACTERIA #/AREA URNS HPF: ABNORMAL /HPF
BASOPHILS # BLD AUTO: 0 10E9/L (ref 0–0.2)
BASOPHILS NFR BLD AUTO: 0.4 %
BILIRUB SERPL-MCNC: 0.2 MG/DL (ref 0.2–1.3)
BILIRUB UR QL STRIP: NEGATIVE
BUN SERPL-MCNC: 11 MG/DL (ref 7–30)
CALCIUM SERPL-MCNC: 9.5 MG/DL (ref 8.5–10.1)
CHLORIDE SERPL-SCNC: 109 MMOL/L (ref 94–109)
CO2 SERPL-SCNC: 30 MMOL/L (ref 20–32)
COLOR UR AUTO: YELLOW
CREAT SERPL-MCNC: 0.66 MG/DL (ref 0.52–1.04)
DIFFERENTIAL METHOD BLD: ABNORMAL
EOSINOPHIL # BLD AUTO: 0.1 10E9/L (ref 0–0.7)
EOSINOPHIL NFR BLD AUTO: 2 %
ERYTHROCYTE [DISTWIDTH] IN BLOOD BY AUTOMATED COUNT: 13 % (ref 10–15)
GFR SERPL CREATININE-BSD FRML MDRD: >90 ML/MIN/{1.73_M2}
GLUCOSE SERPL-MCNC: 125 MG/DL (ref 70–99)
GLUCOSE UR STRIP-MCNC: NEGATIVE MG/DL
HBA1C MFR BLD: 5.4 % (ref 0–5.6)
HCT VFR BLD AUTO: 37.5 % (ref 35–47)
HGB BLD-MCNC: 12 G/DL (ref 11.7–15.7)
HGB UR QL STRIP: NEGATIVE
KETONES UR STRIP-MCNC: NEGATIVE MG/DL
LEUKOCYTE ESTERASE UR QL STRIP: ABNORMAL
LYMPHOCYTES # BLD AUTO: 1.1 10E9/L (ref 0.8–5.3)
LYMPHOCYTES NFR BLD AUTO: 22.2 %
MCH RBC QN AUTO: 33 PG (ref 26.5–33)
MCHC RBC AUTO-ENTMCNC: 32 G/DL (ref 31.5–36.5)
MCV RBC AUTO: 103 FL (ref 78–100)
MONOCYTES # BLD AUTO: 0.5 10E9/L (ref 0–1.3)
MONOCYTES NFR BLD AUTO: 10.6 %
NEUTROPHILS # BLD AUTO: 3.2 10E9/L (ref 1.6–8.3)
NEUTROPHILS NFR BLD AUTO: 64.8 %
NITRATE UR QL: NEGATIVE
NON-SQ EPI CELLS #/AREA URNS LPF: ABNORMAL /LPF
NT-PROBNP SERPL-MCNC: 42 PG/ML (ref 0–125)
PH UR STRIP: 7.5 PH (ref 5–7)
PLATELET # BLD AUTO: 297 10E9/L (ref 150–450)
POTASSIUM SERPL-SCNC: 3.9 MMOL/L (ref 3.4–5.3)
PROT SERPL-MCNC: 7.4 G/DL (ref 6.8–8.8)
RBC # BLD AUTO: 3.64 10E12/L (ref 3.8–5.2)
RBC #/AREA URNS AUTO: ABNORMAL /HPF
SODIUM SERPL-SCNC: 141 MMOL/L (ref 133–144)
SOURCE: ABNORMAL
SP GR UR STRIP: 1.02 (ref 1–1.03)
UROBILINOGEN UR STRIP-ACNC: 1 EU/DL (ref 0.2–1)
WBC # BLD AUTO: 5 10E9/L (ref 4–11)
WBC #/AREA URNS AUTO: ABNORMAL /HPF

## 2021-02-09 PROCEDURE — 80053 COMPREHEN METABOLIC PANEL: CPT | Performed by: FAMILY MEDICINE

## 2021-02-09 PROCEDURE — 83036 HEMOGLOBIN GLYCOSYLATED A1C: CPT | Performed by: FAMILY MEDICINE

## 2021-02-09 PROCEDURE — 83880 ASSAY OF NATRIURETIC PEPTIDE: CPT | Performed by: FAMILY MEDICINE

## 2021-02-09 PROCEDURE — 36415 COLL VENOUS BLD VENIPUNCTURE: CPT | Performed by: FAMILY MEDICINE

## 2021-02-09 PROCEDURE — 85025 COMPLETE CBC W/AUTO DIFF WBC: CPT | Performed by: FAMILY MEDICINE

## 2021-02-09 PROCEDURE — 81001 URINALYSIS AUTO W/SCOPE: CPT | Performed by: FAMILY MEDICINE

## 2021-02-11 NOTE — RESULT ENCOUNTER NOTE
Please print letter and attach results.  PSK      Attached you will find a copy of your recent laboratory report.  Your urine testing does not show any infection or abnormal protein levels.  There is no indication of heart failure as a cause for the swelling/fluid retention.  Your kidney function is normal.  Your blood sugar is normal considering you were not fasting for the appointment and the long term blood sugar testing (A1C) is normal for you.  Your liver testing shows one borderline abnormal level.  This has been intermittently present in the past (October 2020) and normal in January.  We will continue to monitor this intermittently for you.  Your blood cell counts are normal although the cell size of the red blood cells are slightly enlarged.  This can be related to a lack of B12 - your B12 level was normal in October.  Be sure you are eating adequate protein in your diet.  Please call or MyChart message me if you have any questions.    Sincerely,         Yina Ham MD

## 2021-02-12 ENCOUNTER — TELEPHONE (OUTPATIENT)
Dept: FAMILY MEDICINE | Facility: CLINIC | Age: 66
End: 2021-02-12

## 2021-02-12 ENCOUNTER — TRANSFERRED RECORDS (OUTPATIENT)
Dept: HEALTH INFORMATION MANAGEMENT | Facility: CLINIC | Age: 66
End: 2021-02-12

## 2021-02-12 NOTE — TELEPHONE ENCOUNTER
"Please call patient back today, patient stated \"they will lock her up?\"  Juan Carlos Lomas,  For [facility]  "

## 2021-02-12 NOTE — TELEPHONE ENCOUNTER
This writer attempted to contact pt on 02/12/21      Reason for call gather more information and unable to leave message - no answer.      If patient calls back:   [facility] (MA/TC) called. Inform patient that someone from the team will contact them, document that pt called and route to care team.         Evon José

## 2021-02-19 NOTE — TELEPHONE ENCOUNTER
This writer attempted to contact patient on 02/19/21      Reason for call gather more information and unable to leave message. Mailbox is full.      If patient calls back:   [facility] (MA/TC) called. Inform patient that someone from the team will contact them, document that pt called and route to care team.         Richard [facility] MA

## 2021-06-09 NOTE — PROGRESS NOTES
Pt attended a mental health process group focusing on positive thinking and self talk.  Pt participated activity but had to be reminded to stay on topic and let others talk as she was hyper verbal and tangential at times.  She was receptive to feedback.    show

## 2021-06-22 NOTE — LETTER
"[address]  [address]  [phone]    June 14, 2015      Rosy Jean  [address] [address] [place] [id number]              Dear Rosy,    Attached you will find a copy of your recent laboratory report.   Your cholesterol is elevated but not in a level that would warrant cholesterol lowering medication.  I would recommend dietary measures to lower cholesterol and exercise.   Your blood sugar is borderline elevated.  This is in the \"prediabetic\" range.  Exercise and limiting carbohydrate and sugars in diet can be helpful at preventing progression to diabetes.  The long term blood sugar testing is normal indicating you do not have diabetes.   Your hemoglobin is normal.   Please call or MyChart message me if you have any questions.       Sincerely,    Yina Ham M.D.    " Bilateral Ectropion LL -Ectropion (the lower eyelid rolling outward causing lashes to rub against the eye) was explained to the patient. .-This can result in excessive tearing irritation infection scarring and loss of vision.-Treatment options include observation or surgical correction.-Risks and benefits of ectropion repair were discussed and pt elects to proceed. Will schedule at patient's convenience.-Will have Carlotta call patient to schedule task sent.  Will repair both ectopion same day -Patient advised to stop his ASA x 3 weeks prior to sx. -Call home [phone] or cell [phone] to schedule and can leave message per patients wife if no answer. -No mention of valium rx needed was discussed today.; 's Notes: MR deferred 4/7/2021DFE 4/7/2021

## 2021-07-01 ENCOUNTER — VIRTUAL VISIT (OUTPATIENT)
Dept: FAMILY MEDICINE | Facility: CLINIC | Age: 66
End: 2021-07-01
Payer: COMMERCIAL

## 2021-07-01 DIAGNOSIS — F31.63 BIPOLAR 1 DISORDER, MIXED, SEVERE (H): Primary | ICD-10-CM

## 2021-07-01 PROCEDURE — 99214 OFFICE O/P EST MOD 30 MIN: CPT | Mod: TEL | Performed by: FAMILY MEDICINE

## 2021-07-01 RX ORDER — OLANZAPINE 5 MG/1
5 TABLET ORAL AT BEDTIME
Qty: 30 TABLET | Refills: 0 | Status: SHIPPED | OUTPATIENT
Start: 2021-07-01 | End: 2023-01-30

## 2021-07-01 RX ORDER — ARIPIPRAZOLE 30 MG/1
30 TABLET ORAL AT BEDTIME
Qty: 30 TABLET | Refills: 0 | Status: SHIPPED | OUTPATIENT
Start: 2021-07-01 | End: 2021-12-16 | Stop reason: DRUGHIGH

## 2021-07-01 RX ORDER — LAMOTRIGINE 200 MG/1
200 TABLET ORAL EVERY MORNING
Qty: 30 TABLET | Refills: 0 | Status: SHIPPED | OUTPATIENT
Start: 2021-07-01

## 2021-07-01 NOTE — PATIENT INSTRUCTIONS
I have sent prescriptions for the three medications you were discharged from the hospital with.  These include Lamictal 200 mg in the morning, Abilify 30 mg at bedtime, and olanzapine 5 mg at bedtime.  Please continue these until your appointment with the psychiatrist.    Continue the telehealth day treatment program that you are currently involved in.

## 2021-07-01 NOTE — PROGRESS NOTES
"Rosy is a 66 year old who is being evaluated via a billable telephone visit.      What phone number would you like to be contacted at? [phone]    How would you like to obtain your AVS? Mail a copy    Assessment & Plan     Bipolar 1 disorder, mixed, severe (H)  Patient is in need of bridging prescription to get to her psychiatrist appointment.  I have reviewed her discharge summary and she is to be on the Lamictal 200 mg currently as well as the Abilify and olanzapine as she reports.  Amount of prescription is sent for her.  She believes her follow-up appointment is in the next three weeks so this should be an adequate amount of medication to get her to that appointment.  Continuing with her ongoing day treatment program via telehealth is encouraged.  - lamoTRIgine (LAMICTAL) 200 MG tablet; Take 1 tablet (200 mg) by mouth every morning  - ARIPiprazole (ABILIFY) 30 MG tablet; Take 1 tablet (30 mg) by mouth At Bedtime  - OLANZapine (ZYPREXA) 5 MG tablet; Take 1 tablet (5 mg) by mouth At Bedtime             BMI:   Estimated body mass index is 33.45 kg/m  as calculated from the following:    Height as of 1/4/21: 1.651 m (5' 5\").    Weight as of 1/4/21: 91.2 kg (201 lb).   Weight management plan: Discussed healthy diet and exercise guidelines    Patient Instructions   I have sent prescriptions for the three medications you were discharged from the hospital with.  These include Lamictal 200 mg in the morning, Abilify 30 mg at bedtime, and olanzapine 5 mg at bedtime.  Please continue these until your appointment with the psychiatrist.    Continue the telehealth day treatment program that you are currently involved in.      Return in about 4 weeks (around 7/29/2021) for As needed prior to psychiatry appointment..    Yina Ham MD  [facility]    Subjective   Rosy is a 66 year old who presents for the following health issues     HPI   Patient recently discharged from [facility] after 4 week " psych stay for decompensated bipolar disorder.   Medication adjustments were made and she was discharged with the following treatment.   Lamictal with increasing doses to 200 mg once daily - AM -has just reached this dosage.  She is also taking Abilify 30 mg olanzapine 5 mg at night.  She is gotten an appointment for later this month with [facility] - on 7/21/  Needs meds to get to that appointment.    Since they have not seen her before they are unable to prescribe the medications and she is calling to get a bridging prescription to get to that intake and initial appointment with a psychiatrist.  She is doing day treatment program through [facility] via telehealth.  Reports that she is struggling with her family members but does feel like the group is a good fit for her.    Side effects to other medications.  Reports that they stopped the lithium and Depakote due to side effects while she was hospitalized.    Review of Systems   Constitutional, HEENT, cardiovascular, pulmonary, gi and gu systems are negative, except as otherwise noted.      Objective           Vitals:  No vitals were obtained today due to virtual visit.    Physical Exam   healthy, alert and no distress  PSYCH: Alert and oriented times 3; coherent speech, normal   rate and volume, able to articulate logical thoughts, able   to abstract reason, no tangential thoughts, no hallucinations   or delusions  Her affect is tearful  RESP: No cough, no audible wheezing, able to talk in full sentences  Remainder of exam unable to be completed due to telephone visits                Phone call duration: 8 minutes

## 2021-07-21 ENCOUNTER — TRANSFERRED RECORDS (OUTPATIENT)
Dept: HEALTH INFORMATION MANAGEMENT | Facility: CLINIC | Age: 66
End: 2021-07-21

## 2021-08-20 NOTE — PROGRESS NOTES
Assessment & Plan     Chronic midline thoracic back pain  My initial review of her x-ray indicated significant compression to vertebrae at the area of kyphosis.  This was confirmed with radiology evaluation.  We have discussed the potential for MRI to attempt to ascertain the acuity of the compressions to determine if she would benefit from vertebroplasty or other intervention.  May elect to follow-up with chiropractor that she has seen previously as well.  If that is the case she will call to get a disc with today's x-rays for their evaluation.  - XR Thoracic Spine 3 Views; Future  - [facility] PT and Hand Referral; Future    Compression fracture of T7 vertebra, initial encounter (H)  MRI ordered patient is contacted after her visit with information regarding scheduling.  - MR Thoracic Spine w/o Contrast; Future             Patient Instructions   We will call regarding final xray results and recommendations    Referral to PHYSICAL THERAPY.    If you need a copy of the xrays for chiropractor or other outside record review, notify the office.          Return in about 3 weeks (around 9/13/2021) for as needed for persistent symptoms.    Yina Ham MD  [facility]    Tarah Gallegos is a 66 year old who presents for the following health issues     History of Present Illness       Back Pain:  She presents for follow up of back pain. Patient's back pain is a new problem.    Original cause of back pain: not sure  First noticed back pain: more than 1 month ago  Patient feels back pain: constantlyLocation of back pain:  Right middle of back and left middle of back  Description of back pain: dull ache  Back pain spreads: nowhere    Since patient first noticed back pain, pain is: always present, but gets better and worse  Does back pain interfere with her job:  Yes  On a scale of 1-10 (10 being the worst), patient describes pain as:  6  What makes back pain worse: certain positions, standing and  stress  Acupuncture: not tried  Acetaminophen: not helpful  Activity or exercise: not helpful  Chiropractor:  Not tried  Cold: helpful  Heat: helpful  Massage: not tried  Muscle relaxants: not tried  NSAIDS: not helpful  Opioids: not tried  Physical Therapy: helpful  Rest: helpful  Steroid Injection: not tried  Stretching: helpful  Surgery: not tried  TENS unit: not tried  Topical pain relievers: not tried  Other healthcare providers patient is seeing for back pain: Physical Therapist    She eats 2-3 servings of fruits and vegetables daily.She consumes 1 sweetened beverage(s) daily.She exercises with enough effort to increase her heart rate 10 to 19 minutes per day.  She exercises with enough effort to increase her heart rate 4 days per week. She is missing 1 dose(s) of medications per week.     When painful - dull - saw PHYSICAL THERAPY in hospital.     No xrays done previously.    Accompanying Signs & Symptoms:  Risk of Fracture: None  Risk of Cauda Equina: None  Risk of Infection: None  Risk of Cancer: None  Risk of Ankylosing Spondylitis: Onset at age <35, male, AND morning back stiffness  no             Review of Systems   Constitutional, HEENT, cardiovascular, pulmonary, gi and gu systems are negative, except as otherwise noted.      Objective    /85   Pulse 74   Temp 98  F (36.7  C) (Oral)   Wt 86.5 kg (190 lb 9.6 oz)   LMP 12/07/2007   SpO2 96%   BMI 31.72 kg/m    Body mass index is 31.72 kg/m .  Physical Exam   GENERAL: healthy, alert and no distress  NECK: no adenopathy, no asymmetry, masses, or scars and thyroid normal to palpation  RESP: lungs clear to auscultation - no rales, rhonchi or wheezes  CV: regular rate and rhythm, normal S1 S2, no S3 or S4, no murmur, click or rub, no peripheral edema and peripheral pulses strong  MS: Kyphosis present with the thoracic spine paravertebral muscles in this area.  SKIN: no suspicious lesions or rashes  NEURO: Normal strength and tone, mentation intact  and speech normal  PSYCH: mentation appears normal, affect normal/bright and no signs of christiano noted currently.        Results for orders placed or performed in visit on 08/23/21   XR Thoracic Spine 3 Views     Status: None    Narrative    XR THORACIC SPINE 3 VIEWS 8/23/2021 10:52 AM    INDICATION: Chronic midline thoracic back pain  COMPARISON: None      Impression    IMPRESSION:   Marked thoracic kyphosis. There are multilevel compression deformities  within the thoracic spine which contribute to thoracic kyphosis which  are age-indeterminate. Suspect the compression fractures are involving  the T7 and T8 vertebral bodies with greater than 75% vertebral body  height loss. Suspect an inferior endplate T11 compression fracture  with 25% vertebral height loss which is also age indeterminate. MRI  could be used to evaluate for acuity and also better localize to  determine which thoracic vertebrae are fractured. The partially  visualized lungs are unremarkable. Soft tissues unremarkable.    VIOLET TAN MD         SYSTEM ID:  [id number]               This chart was documented by provider using a voice activated software called Dragon in addition to manual typing. There may be vocabulary errors or other grammatical errors due to this.

## 2021-08-23 ENCOUNTER — OFFICE VISIT (OUTPATIENT)
Dept: FAMILY MEDICINE | Facility: CLINIC | Age: 66
End: 2021-08-23
Payer: COMMERCIAL

## 2021-08-23 ENCOUNTER — TELEPHONE (OUTPATIENT)
Dept: FAMILY MEDICINE | Facility: CLINIC | Age: 66
End: 2021-08-23

## 2021-08-23 ENCOUNTER — ANCILLARY PROCEDURE (OUTPATIENT)
Dept: GENERAL RADIOLOGY | Facility: CLINIC | Age: 66
End: 2021-08-23
Attending: FAMILY MEDICINE
Payer: COMMERCIAL

## 2021-08-23 VITALS
DIASTOLIC BLOOD PRESSURE: 85 MMHG | HEART RATE: 74 BPM | BODY MASS INDEX: 31.72 KG/M2 | TEMPERATURE: 98 F | SYSTOLIC BLOOD PRESSURE: 129 MMHG | WEIGHT: 190.6 LBS | OXYGEN SATURATION: 96 %

## 2021-08-23 DIAGNOSIS — S22.060A COMPRESSION FRACTURE OF T7 VERTEBRA, INITIAL ENCOUNTER (H): ICD-10-CM

## 2021-08-23 DIAGNOSIS — M54.6 CHRONIC MIDLINE THORACIC BACK PAIN: Primary | ICD-10-CM

## 2021-08-23 DIAGNOSIS — M54.6 CHRONIC MIDLINE THORACIC BACK PAIN: ICD-10-CM

## 2021-08-23 DIAGNOSIS — G89.29 CHRONIC MIDLINE THORACIC BACK PAIN: ICD-10-CM

## 2021-08-23 DIAGNOSIS — G89.29 CHRONIC MIDLINE THORACIC BACK PAIN: Primary | ICD-10-CM

## 2021-08-23 PROCEDURE — 72072 X-RAY EXAM THORAC SPINE 3VWS: CPT | Mod: FY | Performed by: RADIOLOGY

## 2021-08-23 PROCEDURE — 99214 OFFICE O/P EST MOD 30 MIN: CPT | Performed by: FAMILY MEDICINE

## 2021-08-23 NOTE — LETTER
August 24, 2021      Rosy Jean  [address] [address] [place] [id number]        Dear ,    Attached you will find a copy of your recent xray report.   The radiologist is noting the 2 vertebrae (t7 and T8)  that we saw in the office as well as a lower vertebrae (T11) with compression fractures as we discussed.     They agreed that MRI evaluation of the area is recommended to attempt to determine timing of the fractures.   You can call [phone] to schedule this at the [facility] in [place]    We will be contacting you to recommend setting up the appointment.   Please call or [employer] message me if you have any questions.       Sincerely,         Yina Ham MD       Resulted Orders   XR Thoracic Spine 3 Views    Narrative    XR THORACIC SPINE 3 VIEWS 8/23/2021 10:52 AM    INDICATION: Chronic midline thoracic back pain  COMPARISON: None      Impression    IMPRESSION:   Marked thoracic kyphosis. There are multilevel compression deformities  within the thoracic spine which contribute to thoracic kyphosis which  are age-indeterminate. Suspect the compression fractures are involving  the T7 and T8 vertebral bodies with greater than 75% vertebral body  height loss. Suspect an inferior endplate T11 compression fracture  with 25% vertebral height loss which is also age indeterminate. MRI  could be used to evaluate for acuity and also better localize to  determine which thoracic vertebrae are fractured. The partially  visualized lungs are unremarkable. Soft tissues unremarkable.    VIOLET TAN MD         SYSTEM ID:  [id number]

## 2021-08-23 NOTE — RESULT ENCOUNTER NOTE
Please print letter and attach results.  PSK      Attached you will find a copy of your recent xray report.  The radiologist is noting the 2 vertebrae (t7 and T8)  that we saw in the office as well as a lower vertebrae (T11) with compression fractures as we discussed.    They agreed that MRI evaluation of the area is recommended to attempt to determine timing of the fractures.   You can call [phone] to schedule this at the [facility] in [place]   We will be contacting you to recommend setting up the appointment.  Please call or MyChart message me if you have any questions.      Sincerely,         Yina Ham MD

## 2021-08-23 NOTE — TELEPHONE ENCOUNTER
This writer attempted to contact patient on 08/23/21      Reason for call results and left message for patient to return call to [facility] [phone]      If patient calls back:   Relay message from provider below, (read verbatim), document that pt called and close encounter        Amy Harris RN

## 2021-08-23 NOTE — TELEPHONE ENCOUNTER
Please call patient re:  Xray results.   MRI recommended to evaluate the timing of compression fractures of the back.  Order is in place - see letter written and copied below.    PSK              Attached you will find a copy of your recent xray report.  The radiologist is noting the 2 vertebrae (t7 and T8)  that we saw in the office as well as a lower vertebrae (T11) with compression fractures as we discussed.    They agreed that MRI evaluation of the area is recommended to attempt to determine timing of the fractures.   You can call [phone] to schedule this at the [facility] building in [place]   We will be contacting you to recommend setting up the appointment.  Please call or MyChart message me if you have any questions.      Sincerely,         Yina Ham MD

## 2021-08-23 NOTE — PATIENT INSTRUCTIONS
We will call regarding final xray results and recommendations    Referral to PHYSICAL THERAPY.    If you need a copy of the xrays for chiropractor or other outside record review, notify the office.

## 2021-08-26 ENCOUNTER — THERAPY VISIT (OUTPATIENT)
Dept: PHYSICAL THERAPY | Facility: CLINIC | Age: 66
End: 2021-08-26
Attending: FAMILY MEDICINE
Payer: COMMERCIAL

## 2021-08-26 DIAGNOSIS — M54.6 BILATERAL THORACIC BACK PAIN: ICD-10-CM

## 2021-08-26 PROCEDURE — 97161 PT EVAL LOW COMPLEX 20 MIN: CPT | Mod: GP | Performed by: PHYSICAL THERAPIST

## 2021-08-26 PROCEDURE — 97110 THERAPEUTIC EXERCISES: CPT | Mod: GP | Performed by: PHYSICAL THERAPIST

## 2021-08-26 NOTE — PROGRESS NOTES
Physical Therapy Initial Evaluation  Subjective:  The history is provided by the patient. No  was used.   Patient Health History  Rosy Jean being seen for mid back pain.     Date of Onset: 8/23/21 MD order for PT.   Problem occurred: reports sudden onset of back pain in 4/2021 with continued pain since then. She went to MD on 8/23, x-rays showed fractures T 7,T 8,T11 and will have a MRI on 9/15.      Pain is reported as 3/10 on pain scale.  General health as reported by patient is good.  Pertinent medical history includes: depression, mental illness, migraines/headaches and overweight.   Red flags:  None as reported by patient.  Medical allergies: none.   Surgeries include:  Orthopedic surgery and other (B knees 2009 2005).    Current medications:  Anti-depressants and anti-inflammatory.    Current occupation is retired.                     Therapist Generated HPI Evaluation         Type of problem:  Thoracic spine.    This is a chronic condition.  Condition occurred with:  Insidious onset.  Where condition occurred: for unknown reasons.  Patient reports pain:  Mid thoracic.  Pain is described as aching and is intermittent.  Pain is worse in the P.M..  Since onset symptoms are gradually improving.  Symptoms are exacerbated by sitting, other, lifting and carrying (standing, reaching overhead)  and relieved by ice and NSAID's.  Special tests included:  X-ray.  Previous treatment includes chiropractic. There was mild improvement following previous treatment.  Barriers include:  None as reported by patient.                        Objective:  Standing Alignment:    Cervical/Thoracic:  Forward head, cervical lordosis increased and thoracic kyphosis increased  Shoulder/UE:  Rounded shoulders, protracted scapula L and protracted scapula R                  Flexibility/Screens:     Upper Extremity:    Decreased left upper extremity flexibility at:  Pectoralis Major and Pectoralis  Minor    Decreased right upper extremity flexibility present at:  Pectoralis Major and Pectoralis Minor    Spine:  Decreased left spine flexibility:  Upper Trap and Levator    Decreased right spine flexibility:  Upper Trap and Levator             Lumbar/SI Evaluation  ROM:    AROM Lumbar:   Flexion:        Hands ot ankles pain free  Ext:                    Mod loss L/T back pain   Side Bend:        Left:     Right:   Rotation:           Left:     Right:   Side Glide:        Left:     Right:           Lumbar Myotomes:  normal                                 Cervical/Thoracic Evaluation  Cervical AROM: normal   Thoracic AROM: normal    Strength: slouched sittingposture    Cervical Myotomes:  normal                        Cervical Palpation:    Tenderness present at Left:    Rhomboids and Erector Spinae  Tenderness present at Right:    Rhomboids and Erector Spinae                                                  General     ROS    Assessment/Plan:    Patient is a 66 year old female with thoracic complaints.    Patient has the following significant findings with corresponding treatment plan.                Diagnosis 1:  Thoracic back pain  Pain -  hot/cold therapy, manual therapy, self management, education, directional preference exercise and home program  Decreased ROM/flexibility - manual therapy, therapeutic exercise and home program  Impaired muscle performance - neuro re-education and home program  Decreased function - therapeutic activities and home program  Impaired posture - neuro re-education and home program    Therapy Evaluation Codes:   1) History comprised of:   Personal factors that impact the plan of care:      None.    Comorbidity factors that impact the plan of care are:      None.     Medications impacting care: None.  2) Examination of Body Systems comprised of:   Body structures and functions that impact the plan of care:      Thoracic Spine.   Activity limitations that impact the plan of care are:       Bending, Lifting, Sitting and Standing.  3) Clinical presentation characteristics are:   Stable/Uncomplicated.  4) Decision-Making    Low complexity using standardized patient assessment instrument and/or measureable assessment of functional outcome.  Cumulative Therapy Evaluation is: Low complexity.    Previous and current functional limitations:  (See Goal Flow Sheet for this information)    Short term and Long term goals: (See Goal Flow Sheet for this information)     Communication ability:  Patient appears to be able to clearly communicate and understand verbal and written communication and follow directions correctly.  Treatment Explanation - The following has been discussed with the patient:   RX ordered/plan of care  Anticipated outcomes  Possible risks and side effects  This patient would benefit from PT intervention to resume normal activities.   Rehab potential is good.    Frequency:  1 X week, once daily  Duration:  for 6 weeks  Discharge Plan:  Achieve all LTG.  Independent in home treatment program.  Reach maximal therapeutic benefit.    Please refer to the daily flowsheet for treatment today, total treatment time and time spent performing 1:1 timed codes.

## 2021-09-15 ENCOUNTER — ANCILLARY PROCEDURE (OUTPATIENT)
Dept: MRI IMAGING | Facility: CLINIC | Age: 66
End: 2021-09-15
Attending: FAMILY MEDICINE
Payer: COMMERCIAL

## 2021-09-15 DIAGNOSIS — S22.060A COMPRESSION FRACTURE OF T7 VERTEBRA, INITIAL ENCOUNTER (H): ICD-10-CM

## 2021-09-15 PROCEDURE — 72146 MRI CHEST SPINE W/O DYE: CPT | Mod: GC | Performed by: RADIOLOGY

## 2021-09-16 ENCOUNTER — VIRTUAL VISIT (OUTPATIENT)
Dept: FAMILY MEDICINE | Facility: CLINIC | Age: 66
End: 2021-09-16
Payer: COMMERCIAL

## 2021-09-16 DIAGNOSIS — M85.88 OSTEOPENIA OF LUMBAR SPINE: ICD-10-CM

## 2021-09-16 DIAGNOSIS — M48.54XA COMPRESSION FRACTURE OF THORACIC SPINE, NON-TRAUMATIC, INITIAL ENCOUNTER (H): Primary | ICD-10-CM

## 2021-09-16 PROCEDURE — 99213 OFFICE O/P EST LOW 20 MIN: CPT | Mod: TEL | Performed by: FAMILY MEDICINE

## 2021-09-16 NOTE — PROGRESS NOTES
Rosy is a 66 year old who is being evaluated via a billable telephone visit.      What phone number would you like to be contacted at? [phone]  How would you like to obtain your AVS? MyChart    Assessment & Plan     Compression fracture of thoracic spine, non-traumatic, initial encounter (H)  Multilevel compression fractures noted.  Edematous changes at T9 may indicate inflammation versus subacute fracture.  Referral Ortho was given for their evaluation.  - Orthopedic  Referral; Future    Osteopenia of lumbar spine  History of osteopenia and now with multiple compression fractures.  Would obtain a DEXA and orders placed for this.  Treatment for osteoporosis should be instituted after these results are obtained.  Current instruction given for calcium and vitamin D use and weightbearing exercise.  - DX Hip/Pelvis/Spine; Future    Ordering of each unique test  Prescription drug management         Patient Instructions   Referral ortho for compression fractures spine.     I would recommend DEXA -  You can call [phone] to schedule this at the [facility] in [place].  I will make recommendations regarding medication to build bones when results return.      CALCIUM SUPPLEMENT  Recommendations:  Postmenopausal women not on estrogen: 1500 mg/day    Dietary sources: These also contain vitamin D  Milk                            8 oz            300 mg  Yogurt                          1 cup           400 mg  Hard cheese                     1.5 oz          300 mg  Cottage cheese                  2 cup           300 mg  Orange juice with Calcium       8 oz            300 mg  Low fat dairy sources are recommended    Supplements:  Tums EX                         300 mg  Tums Ultra                      400 mg  Caltrate 600                    600 mg  Oscal                           500 mg  Oscal/D                         500 mg plus vitamin D  Women's Formula Multivitamin    450 mg      Vitamin D -  recommended amount  - 1000 international unit(s) daily.          Return in about 4 weeks (around 10/14/2021) for as needed for persistent symptoms.    Yina Ham MD  [facility] REUBEN Gallegos is a 66 year old who presents for the following health issues     HPI     Chief Complaint   Patient presents with     Results     go over MRI   See results copied below    walking is tolerated for short periods of time.  Does notice symptoms if standing too long 30-60 min.    Family gathering and she had to sit down in order to relieve back pain symptoms.  Denies any radiation of the pain around her body or up or down the spine.  Pain is midline or a little to the right in the mid upper back area.  No particular known injury.    Review of Systems   Constitutional, HEENT, cardiovascular, pulmonary, gi and gu systems are negative, except as otherwise noted.      Objective           Vitals:  No vitals were obtained today due to virtual visit.    Physical Exam   healthy, alert and no distress  PSYCH: Alert and oriented times 3; coherent speech, normal   rate and volume, able to articulate logical thoughts, able   to abstract reason, no tangential thoughts, no hallucinations   or delusions  Her affect is normal  RESP: No cough, no audible wheezing, able to talk in full sentences  Remainder of exam unable to be completed due to telephone visits    MR Thoracic Spine w/o Contrast    Result Date: 9/15/2021  MR THORACIC SPINE W/O CONTRAST 9/15/2021 10:14 AM Provided History: Compression fracture, T-spine; Compression fracture of T7 vertebra, initial encounter (H) ICD-10: Compression fracture of T7 vertebra, initial encounter (H) Comparison: E ray thoracic spine 8/23/2020 Technique: Sagittal T1- and T2-weighted images and STIR-weighted images through the thoracic spine and axial T2-weighted images were obtained without intravenous contrast. Findings: The external marker is at T7. There are multilevel  compression deformities of the thoracic spine including T7 with 35% height loss, T7 with 40% height loss, T9 with 60% height loss, and T12 with 40% height loss anteriorly. There is increased T2 signal about the T9 vertebral body compression fracture which may indicate subacute nature versus continued inflammatory changes. Spinal alignment is relatively preserved with exaggerated thoracic kyphotic curvature secondary to multilevel compression fractures.  There is no significant disc height narrowing. There is a posterior disc extrusion at T9-T10 causing mild spinal canal and mild bilateral neural foraminal narrowing. No other significant canal or neural foraminal narrowing is noted. Likely hemangioma within the T5 vertebral body. There is normal signal within and normal contour of the thoracic spinal cord.     Impression: 1. Multilevel compression deformities of thoracic spine as above with continued edematous changes at T9 which may indicate ongoing inflammation versus subacute nature. 2. Posterior disc extrusion at T9-T10 with mild spinal canal and mild bilateral foraminal narrowing. I have personally reviewed the examination and initial interpretation and I agree with the findings. GINO PUCKETT MD   SYSTEM ID:  [id number]            Phone call duration: 12 minutes      This chart was documented by provider using a voice activated software called Dragon in addition to manual typing. There may be vocabulary errors or other grammatical errors due to this.

## 2021-09-16 NOTE — PATIENT INSTRUCTIONS
Referral ortho for compression fractures spine.     I would recommend DEXA -  You can call [phone] to schedule this at the [facility] in [place].  I will make recommendations regarding medication to build bones when results return.      CALCIUM SUPPLEMENT  Recommendations:  Postmenopausal women not on estrogen: 1500 mg/day    Dietary sources: These also contain vitamin D  Milk                            8 oz            300 mg  Yogurt                          1 cup           400 mg  Hard cheese                     1.5 oz          300 mg  Cottage cheese                  2 cup           300 mg  Orange juice with Calcium       8 oz            300 mg  Low fat dairy sources are recommended    Supplements:  Tums EX                         300 mg  Tums Ultra                      400 mg  Caltrate 600                    600 mg  Oscal                           500 mg  Oscal/D                         500 mg plus vitamin D  Women's Formula Multivitamin    450 mg      Vitamin D - recommended amount  - 1000 international unit(s) daily.

## 2021-09-16 NOTE — RESULT ENCOUNTER NOTE
Your MRI shows multiple compression fractures as well as a bulging/herniated disc at T9-10.  One of the bones looks like it may be a more recent compression with ongoing inflammation there.  We will discuss this in detail at your appointment later today.  Bone density testing with treatment for osteoporosis contributing to the compression fractures can be discussed as well.  Please call or MyChart message me if you have any questions.      ERUM

## 2021-09-21 ENCOUNTER — THERAPY VISIT (OUTPATIENT)
Dept: PHYSICAL THERAPY | Facility: CLINIC | Age: 66
End: 2021-09-21
Payer: COMMERCIAL

## 2021-09-21 DIAGNOSIS — M54.6 BILATERAL THORACIC BACK PAIN: ICD-10-CM

## 2021-09-21 PROCEDURE — 97112 NEUROMUSCULAR REEDUCATION: CPT | Mod: GP | Performed by: PHYSICAL THERAPIST

## 2021-09-21 PROCEDURE — 97110 THERAPEUTIC EXERCISES: CPT | Mod: GP | Performed by: PHYSICAL THERAPIST

## 2021-09-21 PROCEDURE — 97140 MANUAL THERAPY 1/> REGIONS: CPT | Mod: GP | Performed by: PHYSICAL THERAPIST

## 2021-09-22 ENCOUNTER — TELEPHONE (OUTPATIENT)
Dept: FAMILY MEDICINE | Facility: CLINIC | Age: 66
End: 2021-09-22

## 2021-09-22 NOTE — TELEPHONE ENCOUNTER
Patient Quality Outreach Summary      Summary:    Patient is due/failing the following:   Colonoscopy    Type of outreach:    Sent eXelatet message.    Questions for provider review:    None                                                                                                                    AG Michaud.

## 2021-09-25 ENCOUNTER — HEALTH MAINTENANCE LETTER (OUTPATIENT)
Age: 66
End: 2021-09-25

## 2021-10-01 ENCOUNTER — OFFICE VISIT (OUTPATIENT)
Dept: NEUROSURGERY | Facility: CLINIC | Age: 66
End: 2021-10-01
Attending: FAMILY MEDICINE
Payer: COMMERCIAL

## 2021-10-01 VITALS — RESPIRATION RATE: 12 BRPM | HEART RATE: 81 BPM | DIASTOLIC BLOOD PRESSURE: 81 MMHG | SYSTOLIC BLOOD PRESSURE: 119 MMHG

## 2021-10-01 DIAGNOSIS — M48.54XA COMPRESSION FRACTURE OF THORACIC SPINE, NON-TRAUMATIC, INITIAL ENCOUNTER (H): ICD-10-CM

## 2021-10-01 PROCEDURE — 99203 OFFICE O/P NEW LOW 30 MIN: CPT | Performed by: PHYSICIAN ASSISTANT

## 2021-10-01 NOTE — LETTER
10/1/2021         RE: Rosy Jean  [address] [address] [place] [phone]        Dear Colleague,    Thank you for referring your patient, Rosy Jean, to the [facility] [place]. Please see a copy of my visit note below.    Neurosurgery Consult    HPI    Ms. Jean is a 66-year-old female who presents to clinic for evaluation of multiple thoracic compression fractures.  She states she has had pain for several months perhaps even going back to December 2020.  She denies any specific injury.  She recently had a thoracic MRI and is here to review.  She denies any radicular symptoms.  She reports pain primarily in her midline lower thoracic spine.  She has tried some physical therapy, however the symptoms have been persistent for many months.    Medical history  Possible  Migraine  Bipolar disorder      Social history        B/P: 119/81, T: Data Unavailable, P: 81, R: 12       Exam    Alert oriented no acute distress  Bilateral lower extremities with appropriate strength  Reflexes 2+ patella  Thoracic spine is tender at approximately the T9 level.  No other areas of tenderness, no tenderness to percussion    Imaging    Thoracic MRI demonstrates multiple compression fractures, T12, T8 and T7 appear chronic, T9 demonstrates continued subacute edema.    Assessment    Chronic T7, T8, T12 compression fractures  Subacute T9 compression fracture    Plan:      I have discussed with the patient 3 different options for moving forward.  I offered her to consider a brace, TLSO off-the-shelf, and also offered her to consider kyphoplasty at T9.  The patient would like to think about which of these she would like to pursue and will contact us once she has made the decision if she would like to proceed with kyphoplasty or give it more time and consider wearing a brace.    Total time of 30 minutes spent with the patient today in counseling and coordination of  care.      Again, thank you for allowing me to participate in the care of your patient.        Sincerely,        Marvin Griffith PA-C

## 2021-10-01 NOTE — PROGRESS NOTES
Neurosurgery Consult    HPI    Ms. Jean is a 66-year-old female who presents to clinic for evaluation of multiple thoracic compression fractures.  She states she has had pain for several months perhaps even going back to December 2020.  She denies any specific injury.  She recently had a thoracic MRI and is here to review.  She denies any radicular symptoms.  She reports pain primarily in her midline lower thoracic spine.  She has tried some physical therapy, however the symptoms have been persistent for many months.    Medical history  Possible  Migraine  Bipolar disorder      Social history        B/P: 119/81, T: Data Unavailable, P: 81, R: 12       Exam    Alert oriented no acute distress  Bilateral lower extremities with appropriate strength  Reflexes 2+ patella  Thoracic spine is tender at approximately the T9 level.  No other areas of tenderness, no tenderness to percussion    Imaging    Thoracic MRI demonstrates multiple compression fractures, T12, T8 and T7 appear chronic, T9 demonstrates continued subacute edema.    Assessment    Chronic T7, T8, T12 compression fractures  Subacute T9 compression fracture    Plan:      I have discussed with the patient 3 different options for moving forward.  I offered her to consider a brace, TLSO off-the-shelf, and also offered her to consider kyphoplasty at T9.  The patient would like to think about which of these she would like to pursue and will contact us once she has made the decision if she would like to proceed with kyphoplasty or give it more time and consider wearing a brace.    Total time of 30 minutes spent with the patient today in counseling and coordination of care.

## 2021-10-04 ENCOUNTER — TELEPHONE (OUTPATIENT)
Dept: NEUROSURGERY | Facility: CLINIC | Age: 66
End: 2021-10-04

## 2021-10-04 NOTE — TELEPHONE ENCOUNTER
Have the patient return to clinic on my schedule when  Dr. Cooley is in clinic, or on his schedule. She can be seen either at [facility] or [facility], whichever she prefers.

## 2021-10-04 NOTE — TELEPHONE ENCOUNTER
[facility] Call Center    Phone Message    May a detailed message be left on voicemail: no     Reason for Call: Other: Pt called. Had appt 10.1.21 with Marvin Griffith.  Does want to pursue what they talked about.  Please call back.      Action Taken: Message routed to:  Adult Clinics: Neurology p [phone]    Travel Screening: Not Applicable

## 2021-10-04 NOTE — TELEPHONE ENCOUNTER
Attempted to reach out to patient to schedule an appointment, no answer. Left voice message for patient to call clinic back to further discuss.

## 2021-10-04 NOTE — TELEPHONE ENCOUNTER
The pt would like to proceed with the Kyphoplasty at T9. Encounter routed to Marvin to review and advise on the next steps.    Sonam Cano RN   Neurology Care Coordinator

## 2021-10-05 ENCOUNTER — ANCILLARY PROCEDURE (OUTPATIENT)
Dept: BONE DENSITY | Facility: CLINIC | Age: 66
End: 2021-10-05
Attending: FAMILY MEDICINE
Payer: COMMERCIAL

## 2021-10-05 DIAGNOSIS — M89.9 DISORDER OF BONE: ICD-10-CM

## 2021-10-05 DIAGNOSIS — M85.88 OSTEOPENIA OF LUMBAR SPINE: ICD-10-CM

## 2021-10-05 PROCEDURE — 77081 DXA BONE DENSITY APPENDICULR: CPT | Mod: 59 | Performed by: RADIOLOGY

## 2021-10-05 PROCEDURE — 77080 DXA BONE DENSITY AXIAL: CPT | Performed by: RADIOLOGY

## 2021-10-07 NOTE — RESULT ENCOUNTER NOTE
The result of your recent DEXA scan is abnormal.  The density of your bones is thinner than expected. This is called osteopenia when there is mild to moderate thinning and osteoporosis when there  is moderate to  thinning. This may put you at risk for a fracture.    You have osteopenia of your hip.  You have osteoporosis of your spine.  You have osteoporosis of you wrist.    If you had a previous DEXA scan, these results are worse: for the spine and hips that were tested previously.    To help prevent further loss of bone, the following is recommended:    Take in adequate amounts of Calcium and Vitamin D. These are the building blocks for bones. Most women will need 1500mg of Calcium and 800 international units of Vitamin D daily.    Exercise daily to keep your bones strong. Thirty minutes of moderate walking or other aerobic activity is recommended.    If you are a smoker, make an appointment to discuss smoking cessation.      Since you are not currently on any medication to preserve bone density (Fosamax, Miacalcin, Estrogen (for women who have had a hysterectomy) or Evista), please consider making an appointment to discuss possible treatment options.      If you have any questions or concerns, please call myself or my nurse at [phone].    Sincerely,      Yina Ham M.D.

## 2021-10-08 ENCOUNTER — OFFICE VISIT (OUTPATIENT)
Dept: NEUROSURGERY | Facility: CLINIC | Age: 66
End: 2021-10-08
Payer: COMMERCIAL

## 2021-10-08 ENCOUNTER — ANCILLARY PROCEDURE (OUTPATIENT)
Dept: GENERAL RADIOLOGY | Facility: CLINIC | Age: 66
End: 2021-10-08
Attending: NEUROLOGICAL SURGERY
Payer: COMMERCIAL

## 2021-10-08 VITALS
DIASTOLIC BLOOD PRESSURE: 82 MMHG | WEIGHT: 190 LBS | SYSTOLIC BLOOD PRESSURE: 120 MMHG | HEART RATE: 76 BPM | BODY MASS INDEX: 31.62 KG/M2

## 2021-10-08 DIAGNOSIS — S22.070G COMPRESSION FRACTURE OF T9 VERTEBRA WITH DELAYED HEALING, SUBSEQUENT ENCOUNTER: Primary | ICD-10-CM

## 2021-10-08 DIAGNOSIS — Z01.818 PRE-OP TESTING: ICD-10-CM

## 2021-10-08 DIAGNOSIS — S22.070G COMPRESSION FRACTURE OF T9 VERTEBRA WITH DELAYED HEALING, SUBSEQUENT ENCOUNTER: ICD-10-CM

## 2021-10-08 PROCEDURE — 99214 OFFICE O/P EST MOD 30 MIN: CPT | Performed by: NEUROLOGICAL SURGERY

## 2021-10-08 PROCEDURE — 72020 X-RAY EXAM OF SPINE 1 VIEW: CPT | Performed by: STUDENT IN AN ORGANIZED HEALTH CARE EDUCATION/TRAINING PROGRAM

## 2021-10-08 RX ORDER — CEFAZOLIN SODIUM 2 G/100ML
2 INJECTION, SOLUTION INTRAVENOUS
Status: CANCELLED | OUTPATIENT
Start: 2021-10-08

## 2021-10-08 RX ORDER — CEFAZOLIN SODIUM 2 G/100ML
2 INJECTION, SOLUTION INTRAVENOUS SEE ADMIN INSTRUCTIONS
Status: CANCELLED | OUTPATIENT
Start: 2021-10-08

## 2021-10-08 ASSESSMENT — PAIN SCALES - GENERAL: PAINLEVEL: MILD PAIN (2)

## 2021-10-08 NOTE — NURSING NOTE
Per Dr Cooley, the XRAY completed today showed no changes and he would like to proceed with the Kyphoplasty as discussed in the office visit today. The pt was informed and had no further questions.   Sonam Cano RN   Neurology Care Coordinator

## 2021-10-08 NOTE — LETTER
10/8/2021         RE: Rosy Jean  [address] [address] [place] [phone]        Dear Colleague,    Thank you for referring your patient, Rosy Jean, to the [facility] [place]. Please see a copy of my visit note below.    Ms. Jean is a 66-year-old woman seen today for evaluation of possible kyphoplasty for a nonhealed T9 compression fracture.  History is reviewed today in detail with patient and her  who is an emergency room physician in [place].  She continues to experience lifestyle limiting midthoracic pain which has been present to some degree since December 2020.  She has no neurologic symptoms related to this problem.    On examination, she is able to arise from a seated to standing position with minimal pain behavior.  She exhibits tenderness with midline palpation at a level consistent with a T9 fracture, just to the lower portion of her obvious surface kyphotic deformity.  There is no evidence of thoracic myelopathy and gait appears to be normal.    Her recent thoracic MRI scan is reviewed which shows evidence of healed thoracic compression fractures at T7 and T8.  She is noted to have significant kyphotic deformity on the basis of these 3 contiguous compression fractures.  There is no evidence of significant spinal canal stenosis or cord impingement.    Assessment: Symptomatic nonhealed T9 compression fracture at the apex of a kyphotic deformity in association with contiguous fractures.  I have reviewed the clinical and radiographic findings in substantial detail with the patient and her  and discussed management alternatives including both surgical and nonsurgical means of management.  Given the circumstances I have recommended proceeding with a T9 kyphoplasty to attempt to restore some height of the T9 vertebral body and perhaps at least slightly reduce her thoracic kyphotic deformity.  I spoke with her at length regarding the  details of this procedure as well as potential risks and benefits.  I told her that the risks include failure to improve her symptoms, extravasation of cement into the spinal canal to include permanent paraplegia, extravasation into the systemic circulation with emboli to the heart or lungs with potential fatal consequences, infection, bleeding, adjacent level fracture, etc.  She appeared to have a good understanding at the conclusion of our discussion and wished to proceed if possible.    I asked her if the conclusion of the visit to undergo lateral thoracic radiograph to ensure that T9 has not collapsed to the vertebral plana.  I reviewed the study after she left the office and I believe that the thoracic kyphoplasty is still a viable possibility.  We will schedule surgery for the first practical opportunity.    Approximately 30 minutes in total was spent with the patient the majority reviewing the clinical and radiographic findings, management alternatives, risks and benefits.      Again, thank you for allowing me to participate in the care of your patient.        Sincerely,        Willian Cooley MD

## 2021-10-08 NOTE — PATIENT INSTRUCTIONS
Patient Instructions    Surgery scheduled at [facility] for Thoracic 9 kyphoplasty with Dr. Cooley  Pre-Operative    Surgical risks: blood clots in the leg or lung, problems urinating, nerve damage, drainage from the incision, infection,stiffness    Pre-operative physical with primary care physician within 30 days of surgical date.     Likely same day procedure with discharge home day of surgery, may stay for 23 hour observation hospitalization for monitoring.     Shower procedure  o Please shower with antimicrobial soap the night before surgery and morning of surgery. Please refer to showering instruction sheet in folder.    Eating/Drinking  o Stop all solid foods 8 hours before surgery.  o Keep drinking clear liquids until 4 hours before surgery  - Clear liquids include water, clear juice, black coffee, or clear tea without milk, Gatorade, clear soda.     Medications  o Discontinue Aspirin, NSAIDs (Advil/Ibuprofen, Indocin, Naproxen,Nuprin,Relafen/Nabumetone, Diclofenac,Meloxicam, Aleve, Celebrex) x 7 days prior to surgical date  o You can take Tylenol (Acetaminophen) for pain, 1000 mg  - Do not exceed 3,000 mg per day   o Any other medications prescribed, please discuss with your primary care provider at your pre-operative physical     As part of preparation for your upcoming procedure you are required to have a test for the novel Coronavirus, COVID-19  o Please call the drive-up testing number to schedule your appointment. The test needs to be completed within 4 days (96) hours of surgery.   o Scheduling Number: [phone]   o You may NOT receive the COVID-19 vaccine 72 hours before or after surgery.    Pain Management    Dealing with pain  o As your body heals, you might feel a stabbing, burning, or aching pain. You may also have some numbness.  o Everyone feels pain differently, we may ask you to rate your pain using a pain scale. This will let us know how much pain you feel.    o Keep in mind that medicine won't take away all of your pain. It helps to try other ways to relax and ease pain.     Things to help with pain  o After surgery, we will give you medicine for your pain. These medications work well, but they can make you drowsy, itchy, or sick to your stomach. If we give you narcotics for pain, try to take the pills with food.   o For mild to moderate pain, you can take medication such as Tylenol. These can be used with narcotics, but make sure that your narcotic does not contain Tylenol.   - Do NOT drive while taking narcotic pain medication  - Do NOT drink alcohol while using any pain medication  o You can utilize ice as needed (no longer than 20 minutes at one time)    You may resume taking NSAIDs (ex. Ibuprofen, aleve, naproxen) 14 days post op     Incision Care    No submerging incision in water such as pools, hot tubs, baths for at least 8 weeks or until incision is healed    It is okay to shower, just pat the incision dry     Remove dressing as instructed upon discharge    Watch for signs of infection  o Redness, swelling, warmth, drainage, and fever of 101 degrees or higher  o Notify clinic [phone].  Activity Restrictions    For the first 6-8 weeks, no lifting > 10 pounds, limited bending, twisting, or overhead reaching.    Take stairs in moderation     Ok to walk as tolerated, take short frequent walks. You may gradually increase the distance as tolerated.     Avoid bed rest and prolonged sitting for longer than 30 minutes (change positions frequently while awake)    No contact sports until after follow up visit    No high impact activities such as; running/jogging, snowmobile or 4 b[place] riding or any other recreational vehicles.   Post-Op Follow Up Appointments    2 week incision check with RN    6 week post op follow up visit with Physician Assistant or Nurse Practitioner     Please call to schedule follow up appointment at [phone]  Resources    If you are  currently employed, you will need to be off work for 2-4 weeks for post op recovery and healing.  Please fax any FMLA/short term disability paperwork to [phone]. You may call our clinic when you'd like to return to work and we can provide a work letter.

## 2021-10-08 NOTE — NURSING NOTE
Reviewed pre- and post-operative instructions as outlined in the After Visit Summary/Patient Instructions with patient.   Surgery folder was given to patient and spouse.     Patient Education Topic: pre-operative education     Learner(s): Patient    Knowledge Level: Advanced     Readiness to Learn: Ready    Method:  Verbal explanation and Written material     Outcome: Able to verbalize instructions    Barriers to Learning: No barrier    Covid Testing: patient educated they will need to have a test for the novel Coronavirus, COVID-19.The test needs to be completed within 4 days (96) hours of surgery. Order Placed. Pt will call to schedule once surgery appt has been made.     Scheduling Number: [phone]    Patient had the opportunity for questions to be answered. Advised Patient and Significant other/Spouse  to call clinic with any questions/concerns. Gave patient antibacterial soap for pre-surgery skin preparation.     Pt also sent to  imaging to have thoracic Xray done today.    Paloma Bojorquez, RNCC  Neurology

## 2021-10-08 NOTE — PROGRESS NOTES
Ms. Jean is a 66-year-old woman seen today for evaluation of possible kyphoplasty for a nonhealed T9 compression fracture.  History is reviewed today in detail with patient and her  who is an emergency room physician in [place].  She continues to experience lifestyle limiting midthoracic pain which has been present to some degree since December 2020.  She has no neurologic symptoms related to this problem.    On examination, she is able to arise from a seated to standing position with minimal pain behavior.  She exhibits tenderness with midline palpation at a level consistent with a T9 fracture, just to the lower portion of her obvious surface kyphotic deformity.  There is no evidence of thoracic myelopathy and gait appears to be normal.    Her recent thoracic MRI scan is reviewed which shows evidence of healed thoracic compression fractures at T7 and T8.  She is noted to have significant kyphotic deformity on the basis of these 3 contiguous compression fractures.  There is no evidence of significant spinal canal stenosis or cord impingement.    Assessment: Symptomatic nonhealed T9 compression fracture at the apex of a kyphotic deformity in association with contiguous fractures.  I have reviewed the clinical and radiographic findings in substantial detail with the patient and her  and discussed management alternatives including both surgical and nonsurgical means of management.  Given the circumstances I have recommended proceeding with a T9 kyphoplasty to attempt to restore some height of the T9 vertebral body and perhaps at least slightly reduce her thoracic kyphotic deformity.  I spoke with her at length regarding the details of this procedure as well as potential risks and benefits.  I told her that the risks include failure to improve her symptoms, extravasation of cement into the spinal canal to include permanent paraplegia, extravasation into the systemic circulation with emboli to the  heart or lungs with potential fatal consequences, infection, bleeding, adjacent level fracture, etc.  She appeared to have a good understanding at the conclusion of our discussion and wished to proceed if possible.    I asked her if the conclusion of the visit to undergo lateral thoracic radiograph to ensure that T9 has not collapsed to the vertebral plana.  I reviewed the study after she left the office and I believe that the thoracic kyphoplasty is still a viable possibility.  We will schedule surgery for the first practical opportunity.    Approximately 30 minutes in total was spent with the patient the majority reviewing the clinical and radiographic findings, management alternatives, risks and benefits.

## 2021-11-04 DIAGNOSIS — Z11.59 ENCOUNTER FOR SCREENING FOR OTHER VIRAL DISEASES: ICD-10-CM

## 2021-11-09 NOTE — H&P (VIEW-ONLY)
[address] [address]  Phone: [phone]  Primary Provider: Yina Ham  Pre-op Performing Provider: MAX GARAY      PREOPERATIVE EVALUATION:  Today's date: 11/16/2021    Rosy Jean is a 66 year old female who presents for a preoperative evaluation.    Surgical Information:  Surgery/Procedure: Thoracic 9 kyphoplasty  Surgery Location: [facility]  Surgeon: Willian Cooley MD  Surgery Date: 11/18/2021  Time of Surgery: 10:15 am  Where patient plans to recover: At home with family  Fax number for surgical facility: Note does not need to be faxed, will be available electronically in Epic.    Type of Anesthesia Anticipated: General    Assessment & Plan     The proposed surgical procedure is considered INTERMEDIATE risk.    Problem List Items Addressed This Visit        Digestive    Obesity       Musculoskeletal and Integumentary    Compression fracture of thoracic spine, non-traumatic, initial encounter (H)       Behavioral    Bipolar disorder (H)      Other Visit Diagnoses     Preop general physical exam    -  Primary          Risks and Recommendations:  The patient has the following additional risks and recommendations for perioperative complications:   - No identified additional risk factors other than previously addressed    Medication Instructions:  Hold all medications on the morning of the surgery, take after the surgery  NPO for 8 hours before the surgery    RECOMMENDATION:  APPROVAL GIVEN to proceed with proposed procedure, without further diagnostic evaluation.    Review of external notes as documented above       20 minutes spent on the date of the encounter doing chart review, history and exam, documentation and further activities per the note      Subjective     HPI related to upcoming procedure: thoracic spine compression fracture       Preop Questions 11/16/2021   1. Have  you ever had a heart attack or stroke? No   2. Have you ever had surgery on your heart or blood vessels, such as a stent placement, a coronary artery bypass, or surgery on an artery in your head, neck, heart, or legs? No   3. Do you have chest pain with activity? No   4. Do you have a history of  heart failure? No   5. Do you currently have a cold, bronchitis or symptoms of other infection? No   6. Do you have a cough, shortness of breath, or wheezing? No   7. Do you or anyone in your family have previous history of blood clots? No   8. Do you or does anyone in your family have a serious bleeding problem such as prolonged bleeding following surgeries or cuts? No   9. Have you ever had problems with anemia or been told to take iron pills? No   10. Have you had any abnormal blood loss such as black, tarry or bloody stools, or abnormal vaginal bleeding? No   11. Have you ever had a blood transfusion? No   12. Are you willing to have a blood transfusion if it is medically needed before, during, or after your surgery? YES   13. Have you or any of your relatives ever had problems with anesthesia? No   14. Do you have sleep apnea, excessive snoring or daytime drowsiness? No   15. Do you have any artifical heart valves or other implanted medical devices like a pacemaker, defibrillator, or continuous glucose monitor? No   16. Do you have artificial joints? No   17. Are you allergic to latex? No     Health Care Directive:  Patient does not have a Health Care Directive or Living Will: Discussed advance care planning with patient; however, patient declined at this time. FULL CODE    Preoperative Review of :   reviewed - no record of controlled substances prescribed.      Status of Chronic Conditions:  See problem list for active medical problems.  Problems all longstanding and stable, except as noted/documented.  See ROS for pertinent symptoms related to these conditions.      Review of Systems  Constitutional, neuro,  ENT, endocrine, pulmonary, cardiac, gastrointestinal, genitourinary, musculoskeletal, integument and psychiatric systems are negative, except as otherwise noted.    Patient Active Problem List    Diagnosis Date Noted     Compression fracture of thoracic spine, non-traumatic, initial encounter (H) 09/16/2021     Priority: Medium     Bilateral thoracic back pain 08/26/2021     Priority: Medium     Acute cystitis without hematuria 10/02/2020     Priority: Medium     Bipolar 1 disorder, mixed, severe (H) 10/02/2020     Priority: Medium     Bipolar affective (H) 01/10/2017     Priority: Medium     Disorganized thought process 12/08/2016     Priority: Medium     Obesity 03/24/2014     Priority: Medium     Advanced directives, counseling/discussion 12/17/2012     Priority: Medium     Discussed advance care planning with patient; information given to patient to review. 12/17/2012          CARDIOVASCULAR SCREENING; LDL GOAL LESS THAN 160 10/31/2010     Priority: Medium     [place] 10-year CHD Risk Score: 2% (13 Total Points)   Values used to calculate score:     Age: 58 years -- Points: 8     Total Cholesterol: 218 mg/dL -- Points: 4     HDL Cholesterol: 46 mg/dL -- Points: 1     Systolic BP (untreated): 110 mmHg -- Points: 0     The patient is not a smoker. -- Points: 0     The patient has not been diagnosed with diabetes. -- Points: 0     The patient does not have a family history of CHD. -- Points:0        Tear meniscus knee 06/04/2009     Priority: Medium     Migraine headache 06/04/2009     Priority: Medium     Triggered by menopause and weather  (Problem list name updated by automated process. Provider to review and confirm.)       Heart murmur 06/04/2009     Priority: Medium     Bipolar disorder (H) 04/16/2008     Priority: Medium     Psychiatrist: [facility]  Problem list name updated by automated process. Provider to review       Major depressive disorder, recurrent episode, moderate  (H)      Priority: Medium     Absence of menstruation      Priority: Medium     NONSPECIFIC MEDICAL HISTORY      Priority: Medium     Articulatory Defect due to conductive hearing loss       Migraine      Priority: Medium     Problem list name updated by automated process. Provider to review        Past Medical History:   Diagnosis Date     Absence of menstruation      Bipolar I disorder, most recent episode (or current) manic, moderate (H)      Dermatophytosis of nail      FRACTURE RIB NOS-CLOSED 5/7/2008    May 2008     Major depressive disorder, recurrent episode, moderate (H)      Migraine, unspecified, without mention of intractable migraine without mention of status migrainosus      NONSPECIFIC MEDICAL HISTORY     Articulatory Defect due to conductive hearing loss     Other malaise and fatigue      Pain in joint, lower leg      Plantar fascial fibromatosis      PLANTAR FIBROMATOSIS     2005     Past Surgical History:   Procedure Laterality Date     HC KNEE SCOPE,ABRASN ARTHROPLASTY  10/05     LIGATN/STRIP LONG OR SHORT SAPHEN       Current Outpatient Medications   Medication Sig Dispense Refill     ARIPiprazole (ABILIFY) 30 MG tablet Take 1 tablet (30 mg) by mouth At Bedtime 30 tablet 0     lamoTRIgine (LAMICTAL) 200 MG tablet Take 1 tablet (200 mg) by mouth every morning 30 tablet 0     melatonin 3 MG tablet Take 2 tablets (6 mg) by mouth At Bedtime 60 tablet 1     Multiple Vitamin (DAILY MULTIVITAMIN PO) Take 1 tablet by mouth daily.       OLANZapine (ZYPREXA) 5 MG tablet Take 1 tablet (5 mg) by mouth At Bedtime 30 tablet 0     Omega-3 Fatty Acids (FISH OIL PO) Take 1 capsule by mouth daily.         Allergies   Allergen Reactions     Shellfish Allergy Anaphylaxis        Social History     Tobacco Use     Smoking status: Former Smoker     Smokeless tobacco: Never Used     Tobacco comment: 1/2 cigarette once in a while for headaches   Substance Use Topics     Alcohol use: Yes     Comment: very rare     Family  "History   Problem Relation Age of Onset     Asthma Son      Asthma Daughter      Osteoporosis Mother      Diabetes Mother      Breast Cancer Mother         mastectomy     Cancer Father         renal     Psychotic Disorder Sister         adhd     Lipids Sister      Diabetes Other         uncle     C.A.D. No family hx of      Hypertension No family hx of      Cancer - colorectal No family hx of      Thyroid Disease No family hx of      Anesthesia Reaction No family hx of      History   Drug Use No         Objective     /87 (BP Location: Left arm, Patient Position: Chair, Cuff Size: Adult Regular)   Pulse 76   Temp 97.2  F (36.2  C) (Tympanic)   Ht 1.651 m (5' 5\")   Wt 91.6 kg (202 lb)   LMP 12/07/2007   SpO2 96%   Breastfeeding No   BMI 33.61 kg/m      Physical Exam    GENERAL APPEARANCE: healthy, alert and no distress     EYES: EOMI, PERRL     HENT: ear canals and TM's normal and nose and mouth without ulcers or lesions     NECK: no adenopathy, no asymmetry, masses, or scars and thyroid normal to palpation     RESP: lungs clear to auscultation - no rales, rhonchi or wheezes     CV: regular rates and rhythm, normal S1 S2, no S3 or S4 and no murmur, click or rub     ABDOMEN:  soft, nontender, no HSM or masses and bowel sounds normal     MS: extremities normal- no gross deformities noted, no evidence of inflammation in joints, FROM in all extremities.     SKIN: no suspicious lesions or rashes     NEURO: Normal strength and tone, sensory exam grossly normal, mentation intact and speech normal     PSYCH: mentation appears normal. and affect normal/bright     LYMPHATICS: No cervical adenopathy    Recent Labs   Lab Test 02/09/21  1316 01/04/21  1545 10/19/20  0814 10/13/20  0739 10/02/20  1856   HGB 12.0  --   --   --  13.6     --   --  237 229    139   < >  --  140   POTASSIUM 3.9 3.8   < >  --  3.5   CR 0.66 0.71   < >  --  0.69   A1C 5.4  --   --   --   --     < > = values in this interval not " displayed.          Diagnostics:  No labs were ordered during this visit.   CBC 11/2/2021- within normal limits   EKG: appears normal, NSR, normal axis, normal intervals, no acute ST/T changes c/w ischemia, no LVH by voltage criteria, there are no prior tracings available 5/17/2021   Ref Range & Units 6 mo ago   Interpretation  Normal sinus rhythm   Normal ECG   No previous ECGs available    Ventricular Rate BPM 99    Atrial Rate BPM 99    P-R Interval ms 166    QRS Duration ms 94    QT ms 352    QTc ms 451    P Axis degrees 51    R Axis degrees 35    T Axis degrees 36    Specimen Collected: --   Date: 05/17/21     Revised Cardiac Risk Index (RCRI):  The patient has the following serious cardiovascular risks for perioperative complications:   - No serious cardiac risks = 0 points     RCRI Interpretation: 0 points: Class I (very low risk - 0.4% complication rate)           Signed Electronically by: Neva Broderick PA-C  Copy of this evaluation report is provided to requesting physician.

## 2021-11-09 NOTE — PATIENT INSTRUCTIONS
Preparing for Your Surgery  Getting started  A nurse will call you to review your health history and instructions. They will give you an arrival time based on your scheduled surgery time.  Please be ready to share the following:    Your doctor's clinic name and phone number    Your medical, surgical and anesthesia history    A list of allergies and sensitivities    A list of medicines, including herbal treatments and over-the-counter drugs    Whether the patient has a legal guardian (ask how to send us the papers in advance)  If you have a child who's having surgery, please ask for a copy of Preparing for Your Child's Surgery.    Preparing for surgery    Within 30 days of surgery: Have a pre-op exam (sometimes called an H&P, or History and Physical). This can be done at a clinic or pre-operative center.  ? If you're having a , you may not need this exam. Talk to your care team    At your pre-op exam, talk to your care team about all medicines you take. If you need to stop any medicines before surgery, ask when to start taking them again.  ? We do this for your safety. Many medicines can make you bleed too much during surgery. Some change how well surgery (anesthesia) drugs work.    Call your insurance company to let them know you're having surgery. (If you don't have insurance, call [phone].)    Call your clinic if there's any change in your health. This includes signs of a cold or flu (sore throat, runny nose, cough, rash, fever). It also includes a scrape or scratch near the surgery site.    If you have questions on the day of surgery, call your hospital or surgery center.  Eating and drinking guidelines  For your safety: Unless your surgeon tells you otherwise, follow the guidelines below.    Eat and drink as usual until 8 hours before surgery. After that, no food or milk.    Drink clear liquids until 2 hours before surgery. These are liquids you can see through, like water, Gatorade and Propel  Water. You may also have black coffee and tea (no cream or milk).    Nothing by mouth within 2 hours of surgery. This includes gum, candy and breath mints.    If you drink, stop drinking alcohol the night before surgery.    If your care team tells you to take medicine on the morning of surgery, it's okay to take it with a sip of water.  Preventing infection    Shower or bathe the night before and morning of your surgery. Follow the instructions your clinic gave you. (If no instructions, use regular soap.)    Don't shave or clip hair near your surgery site. We'll remove the hair if needed.    Don't smoke or vape the morning of surgery. You may chew nicotine gum up to 2 hours before surgery. A nicotine patch is okay.  ? Note: Some surgeries require you to completely quit smoking and nicotine. Check with your surgeon.    Your care team will make every effort to keep you safe from infection. We will:  ? Clean our hands often with soap and water (or an alcohol-based hand rub).  ? Clean the skin at your surgery site with a special soap that kills germs.  ? Give you a special gown to keep you warm. (Cold raises the risk of infection.)  ? Wear special hair covers, masks, gowns and gloves during surgery.  ? Give antibiotic medicine, if prescribed. Not all surgeries need antibiotics.  What to bring on the day of surgery    Photo ID and insurance card    Copy of your health care directive, if you have one    Glasses and hearing aides (bring cases)  ? You can't wear contacts during surgery    Inhaler and eye drops, if you use them (tell us about these when you arrive)    CPAP machine or breathing device, if you use them    A few personal items, if spending the night    If you have . . .  ? A pacemaker or ICD (cardiac defibrillator): Bring the ID card.  ? An implanted stimulator: Bring the remote control.  ? A legal guardian: Bring a copy of the certified (court-stamped) guardianship papers.  Please remove any jewelry, including  body piercings. Leave jewelry and other valuables at home.  If you're going home the day of surgery  Important: If you don't follow the rules below, we must cancel your surgery.     Arrange for someone to drive you home after surgery. You may not drive, take a taxi or take public transportation by yourself (unless you'll have local anesthesia only).    Arrange for a responsible adult to stay with you overnight. If you don't, we may keep you in the hospital overnight, and you may need to pay the costs yourself.  Questions?   If you have any questions for your care team, list them here: _________________________________________________________________________________________________________________________________________________________________________________________________________________________________________________________________________________________________________________________  For informational purposes only. Not to replace the advice of your health care provider. Copyright   2003, 2019 [facility] [employer]. All rights reserved. Clinically reviewed by Salena Ramos MD. [employer] 031071 - REV 4/20.  At [facility], we strive to deliver an exceptional experience to you, every time we see you. If you receive a survey, please complete it as we do value your feedback.  If you have MyChart, you can expect to receive results automatically within 24 hours of their completion.  Your provider will send a note interpreting your results as well.   If you do not have MyChart, you should receive your results in about a week by mail.    Your care team:                            Family Medicine Internal Medicine   MD Bernardino Mir MD Shantel Branch-Fleming, MD Srinivasa Vaka, MD Katya Belousova, PALisaC  Neeta Chou, APRN MAIDA Ponce MD Pediatrics   Darvin Wang, PAJACK Garza, MD Dariana Taylor APRN CNP    MD Fya Salcedo MD Deborah Mielke, MD Kim Thein, APRN [facility]      Clinic hours: Monday - Thursday 7 am-6 pm; Fridays 7 am-5 pm.   Urgent care: Monday - Friday 10 am- 8 pm; Saturday and Sunday 9 am-5 pm.    Clinic: [phone]       [facility]: Monday - Thursday 8 am - 7 pm; Friday 8 am - 6 pm  [facility]: [phone]     Use [email or web address] for 24/7 diagnosis and treatment of dozens of conditions.

## 2021-11-09 NOTE — PROGRESS NOTES
[address] [address]  Phone: [phone]  Primary Provider: Yina Ham  Pre-op Performing Provider: MAX GARAY      PREOPERATIVE EVALUATION:  Today's date: 11/16/2021    Rosy Jean is a 66 year old female who presents for a preoperative evaluation.    Surgical Information:  Surgery/Procedure: Thoracic 9 kyphoplasty  Surgery Location: [facility]  Surgeon: Willian Cooley MD  Surgery Date: 11/18/2021  Time of Surgery: 10:15 am  Where patient plans to recover: At home with family  Fax number for surgical facility: Note does not need to be faxed, will be available electronically in Epic.    Type of Anesthesia Anticipated: General    Assessment & Plan     The proposed surgical procedure is considered INTERMEDIATE risk.    Problem List Items Addressed This Visit        Digestive    Obesity       Musculoskeletal and Integumentary    Compression fracture of thoracic spine, non-traumatic, initial encounter (H)       Behavioral    Bipolar disorder (H)      Other Visit Diagnoses     Preop general physical exam    -  Primary          Risks and Recommendations:  The patient has the following additional risks and recommendations for perioperative complications:   - No identified additional risk factors other than previously addressed    Medication Instructions:  Hold all medications on the morning of the surgery, take after the surgery  NPO for 8 hours before the surgery    RECOMMENDATION:  APPROVAL GIVEN to proceed with proposed procedure, without further diagnostic evaluation.    Review of external notes as documented above       20 minutes spent on the date of the encounter doing chart review, history and exam, documentation and further activities per the note      Subjective     HPI related to upcoming procedure: thoracic spine compression fracture       Preop Questions 11/16/2021   1. Have  you ever had a heart attack or stroke? No   2. Have you ever had surgery on your heart or blood vessels, such as a stent placement, a coronary artery bypass, or surgery on an artery in your head, neck, heart, or legs? No   3. Do you have chest pain with activity? No   4. Do you have a history of  heart failure? No   5. Do you currently have a cold, bronchitis or symptoms of other infection? No   6. Do you have a cough, shortness of breath, or wheezing? No   7. Do you or anyone in your family have previous history of blood clots? No   8. Do you or does anyone in your family have a serious bleeding problem such as prolonged bleeding following surgeries or cuts? No   9. Have you ever had problems with anemia or been told to take iron pills? No   10. Have you had any abnormal blood loss such as black, tarry or bloody stools, or abnormal vaginal bleeding? No   11. Have you ever had a blood transfusion? No   12. Are you willing to have a blood transfusion if it is medically needed before, during, or after your surgery? YES   13. Have you or any of your relatives ever had problems with anesthesia? No   14. Do you have sleep apnea, excessive snoring or daytime drowsiness? No   15. Do you have any artifical heart valves or other implanted medical devices like a pacemaker, defibrillator, or continuous glucose monitor? No   16. Do you have artificial joints? No   17. Are you allergic to latex? No     Health Care Directive:  Patient does not have a Health Care Directive or Living Will: Discussed advance care planning with patient; however, patient declined at this time. FULL CODE    Preoperative Review of :   reviewed - no record of controlled substances prescribed.      Status of Chronic Conditions:  See problem list for active medical problems.  Problems all longstanding and stable, except as noted/documented.  See ROS for pertinent symptoms related to these conditions.      Review of Systems  Constitutional, neuro,  ENT, endocrine, pulmonary, cardiac, gastrointestinal, genitourinary, musculoskeletal, integument and psychiatric systems are negative, except as otherwise noted.    Patient Active Problem List    Diagnosis Date Noted     Compression fracture of thoracic spine, non-traumatic, initial encounter (H) 09/16/2021     Priority: Medium     Bilateral thoracic back pain 08/26/2021     Priority: Medium     Acute cystitis without hematuria 10/02/2020     Priority: Medium     Bipolar 1 disorder, mixed, severe (H) 10/02/2020     Priority: Medium     Bipolar affective (H) 01/10/2017     Priority: Medium     Disorganized thought process 12/08/2016     Priority: Medium     Obesity 03/24/2014     Priority: Medium     Advanced directives, counseling/discussion 12/17/2012     Priority: Medium     Discussed advance care planning with patient; information given to patient to review. 12/17/2012          CARDIOVASCULAR SCREENING; LDL GOAL LESS THAN 160 10/31/2010     Priority: Medium     [place] 10-year CHD Risk Score: 2% (13 Total Points)   Values used to calculate score:     Age: 58 years -- Points: 8     Total Cholesterol: 218 mg/dL -- Points: 4     HDL Cholesterol: 46 mg/dL -- Points: 1     Systolic BP (untreated): 110 mmHg -- Points: 0     The patient is not a smoker. -- Points: 0     The patient has not been diagnosed with diabetes. -- Points: 0     The patient does not have a family history of CHD. -- Points:0        Tear meniscus knee 06/04/2009     Priority: Medium     Migraine headache 06/04/2009     Priority: Medium     Triggered by menopause and weather  (Problem list name updated by automated process. Provider to review and confirm.)       Heart murmur 06/04/2009     Priority: Medium     Bipolar disorder (H) 04/16/2008     Priority: Medium     Psychiatrist: [facility]  Problem list name updated by automated process. Provider to review       Major depressive disorder, recurrent episode, moderate  (H)      Priority: Medium     Absence of menstruation      Priority: Medium     NONSPECIFIC MEDICAL HISTORY      Priority: Medium     Articulatory Defect due to conductive hearing loss       Migraine      Priority: Medium     Problem list name updated by automated process. Provider to review        Past Medical History:   Diagnosis Date     Absence of menstruation      Bipolar I disorder, most recent episode (or current) manic, moderate (H)      Dermatophytosis of nail      FRACTURE RIB NOS-CLOSED 5/7/2008    May 2008     Major depressive disorder, recurrent episode, moderate (H)      Migraine, unspecified, without mention of intractable migraine without mention of status migrainosus      NONSPECIFIC MEDICAL HISTORY     Articulatory Defect due to conductive hearing loss     Other malaise and fatigue      Pain in joint, lower leg      Plantar fascial fibromatosis      PLANTAR FIBROMATOSIS     2005     Past Surgical History:   Procedure Laterality Date     HC KNEE SCOPE,ABRASN ARTHROPLASTY  10/05     LIGATN/STRIP LONG OR SHORT SAPHEN       Current Outpatient Medications   Medication Sig Dispense Refill     ARIPiprazole (ABILIFY) 30 MG tablet Take 1 tablet (30 mg) by mouth At Bedtime 30 tablet 0     lamoTRIgine (LAMICTAL) 200 MG tablet Take 1 tablet (200 mg) by mouth every morning 30 tablet 0     melatonin 3 MG tablet Take 2 tablets (6 mg) by mouth At Bedtime 60 tablet 1     Multiple Vitamin (DAILY MULTIVITAMIN PO) Take 1 tablet by mouth daily.       OLANZapine (ZYPREXA) 5 MG tablet Take 1 tablet (5 mg) by mouth At Bedtime 30 tablet 0     Omega-3 Fatty Acids (FISH OIL PO) Take 1 capsule by mouth daily.         Allergies   Allergen Reactions     Shellfish Allergy Anaphylaxis        Social History     Tobacco Use     Smoking status: Former Smoker     Smokeless tobacco: Never Used     Tobacco comment: 1/2 cigarette once in a while for headaches   Substance Use Topics     Alcohol use: Yes     Comment: very rare     Family  "History   Problem Relation Age of Onset     Asthma Son      Asthma Daughter      Osteoporosis Mother      Diabetes Mother      Breast Cancer Mother         mastectomy     Cancer Father         renal     Psychotic Disorder Sister         adhd     Lipids Sister      Diabetes Other         uncle     C.A.D. No family hx of      Hypertension No family hx of      Cancer - colorectal No family hx of      Thyroid Disease No family hx of      Anesthesia Reaction No family hx of      History   Drug Use No         Objective     /87 (BP Location: Left arm, Patient Position: Chair, Cuff Size: Adult Regular)   Pulse 76   Temp 97.2  F (36.2  C) (Tympanic)   Ht 1.651 m (5' 5\")   Wt 91.6 kg (202 lb)   LMP 12/07/2007   SpO2 96%   Breastfeeding No   BMI 33.61 kg/m      Physical Exam    GENERAL APPEARANCE: healthy, alert and no distress     EYES: EOMI, PERRL     HENT: ear canals and TM's normal and nose and mouth without ulcers or lesions     NECK: no adenopathy, no asymmetry, masses, or scars and thyroid normal to palpation     RESP: lungs clear to auscultation - no rales, rhonchi or wheezes     CV: regular rates and rhythm, normal S1 S2, no S3 or S4 and no murmur, click or rub     ABDOMEN:  soft, nontender, no HSM or masses and bowel sounds normal     MS: extremities normal- no gross deformities noted, no evidence of inflammation in joints, FROM in all extremities.     SKIN: no suspicious lesions or rashes     NEURO: Normal strength and tone, sensory exam grossly normal, mentation intact and speech normal     PSYCH: mentation appears normal. and affect normal/bright     LYMPHATICS: No cervical adenopathy    Recent Labs   Lab Test 02/09/21  1316 01/04/21  1545 10/19/20  0814 10/13/20  0739 10/02/20  1856   HGB 12.0  --   --   --  13.6     --   --  237 229    139   < >  --  140   POTASSIUM 3.9 3.8   < >  --  3.5   CR 0.66 0.71   < >  --  0.69   A1C 5.4  --   --   --   --     < > = values in this interval not " displayed.          Diagnostics:  No labs were ordered during this visit.   CBC 11/2/2021- within normal limits   EKG: appears normal, NSR, normal axis, normal intervals, no acute ST/T changes c/w ischemia, no LVH by voltage criteria, there are no prior tracings available 5/17/2021   Ref Range & Units 6 mo ago   Interpretation  Normal sinus rhythm   Normal ECG   No previous ECGs available    Ventricular Rate BPM 99    Atrial Rate BPM 99    P-R Interval ms 166    QRS Duration ms 94    QT ms 352    QTc ms 451    P Axis degrees 51    R Axis degrees 35    T Axis degrees 36    Specimen Collected: --   Date: 05/17/21     Revised Cardiac Risk Index (RCRI):  The patient has the following serious cardiovascular risks for perioperative complications:   - No serious cardiac risks = 0 points     RCRI Interpretation: 0 points: Class I (very low risk - 0.4% complication rate)           Signed Electronically by: Neva Broderick PA-C  Copy of this evaluation report is provided to requesting physician.       121

## 2021-11-15 ENCOUNTER — LAB (OUTPATIENT)
Dept: URGENT CARE | Facility: URGENT CARE | Age: 66
End: 2021-11-15
Attending: NEUROLOGICAL SURGERY
Payer: COMMERCIAL

## 2021-11-15 DIAGNOSIS — Z11.59 ENCOUNTER FOR SCREENING FOR OTHER VIRAL DISEASES: ICD-10-CM

## 2021-11-15 PROCEDURE — U0005 INFEC AGEN DETEC AMPLI PROBE: HCPCS

## 2021-11-15 PROCEDURE — U0003 INFECTIOUS AGENT DETECTION BY NUCLEIC ACID (DNA OR RNA); SEVERE ACUTE RESPIRATORY SYNDROME CORONAVIRUS 2 (SARS-COV-2) (CORONAVIRUS DISEASE [COVID-19]), AMPLIFIED PROBE TECHNIQUE, MAKING USE OF HIGH THROUGHPUT TECHNOLOGIES AS DESCRIBED BY CMS-2020-01-R: HCPCS

## 2021-11-16 ENCOUNTER — OFFICE VISIT (OUTPATIENT)
Dept: FAMILY MEDICINE | Facility: CLINIC | Age: 66
End: 2021-11-16
Payer: COMMERCIAL

## 2021-11-16 VITALS
BODY MASS INDEX: 33.66 KG/M2 | HEIGHT: 65 IN | TEMPERATURE: 97.2 F | WEIGHT: 202 LBS | DIASTOLIC BLOOD PRESSURE: 87 MMHG | SYSTOLIC BLOOD PRESSURE: 128 MMHG | HEART RATE: 76 BPM | OXYGEN SATURATION: 96 %

## 2021-11-16 DIAGNOSIS — M48.54XA COMPRESSION FRACTURE OF THORACIC SPINE, NON-TRAUMATIC, INITIAL ENCOUNTER (H): ICD-10-CM

## 2021-11-16 DIAGNOSIS — Z01.818 PREOP GENERAL PHYSICAL EXAM: Primary | ICD-10-CM

## 2021-11-16 DIAGNOSIS — E66.09 CLASS 1 OBESITY DUE TO EXCESS CALORIES WITHOUT SERIOUS COMORBIDITY WITH BODY MASS INDEX (BMI) OF 33.0 TO 33.9 IN ADULT: ICD-10-CM

## 2021-11-16 DIAGNOSIS — E66.811 CLASS 1 OBESITY DUE TO EXCESS CALORIES WITHOUT SERIOUS COMORBIDITY WITH BODY MASS INDEX (BMI) OF 33.0 TO 33.9 IN ADULT: ICD-10-CM

## 2021-11-16 DIAGNOSIS — F31.73 BIPOLAR DISORDER, IN PARTIAL REMISSION, MOST RECENT EPISODE MANIC (H): ICD-10-CM

## 2021-11-16 LAB — SARS-COV-2 RNA RESP QL NAA+PROBE: NEGATIVE

## 2021-11-16 PROCEDURE — 99214 OFFICE O/P EST MOD 30 MIN: CPT | Performed by: PHYSICIAN ASSISTANT

## 2021-11-16 ASSESSMENT — MIFFLIN-ST. JEOR: SCORE: 1467.15

## 2021-11-16 ASSESSMENT — PAIN SCALES - GENERAL: PAINLEVEL: MILD PAIN (2)

## 2021-11-17 ENCOUNTER — ANESTHESIA EVENT (OUTPATIENT)
Dept: SURGERY | Facility: CLINIC | Age: 66
End: 2021-11-17
Payer: COMMERCIAL

## 2021-11-17 NOTE — PROGRESS NOTES
PTA medications updated by Medication Scribe prior to surgery via phone call with patient (last doses completed by Nurse)     Medication history sources: Patient, Surescripts and H&P  In the past week, patient estimated taking medication this percent of the time: Greater than 90%  Adherence assessment: N/A Not Observed    Significant changes made to the medication list:  None      Additional medication history information:   None    Medication reconciliation completed by provider prior to medication history? No    Time spent in this activity: 20 minutes    The information provided in this note is only as accurate as the sources available at the time of update(s)      Prior to Admission medications    Medication Sig Last Dose Taking? Auth Provider   ARIPiprazole (ABILIFY) 30 MG tablet Take 1 tablet (30 mg) by mouth At Bedtime 11/17/2021 at PM Yes Yina Ham MD   lamoTRIgine (LAMICTAL) 200 MG tablet Take 1 tablet (200 mg) by mouth every morning 11/17/2021 at AM Yes Yina Ham MD   melatonin 3 MG tablet Take 2 tablets (6 mg) by mouth At Bedtime  Patient taking differently: Take 6 mg by mouth nightly as needed for sleep   at PRN Yes Ciro Ley MD   Multiple Vitamin (DAILY MULTIVITAMIN PO) Take 1 tablet by mouth daily. 11/17/2021 at AM Yes Reported, Patient   OLANZapine (ZYPREXA) 5 MG tablet Take 1 tablet (5 mg) by mouth At Bedtime 11/17/2021 at PM Yes Yina Ham MD   Omega-3 Fatty Acids (FISH OIL PO) Take 1 capsule by mouth daily. 11/17/2021 at AM Yes Reported, Patient       Medication history completed by:    Martin Parra CPhT  Medication Scribe  [facility]

## 2021-11-18 ENCOUNTER — HOSPITAL ENCOUNTER (OUTPATIENT)
Facility: CLINIC | Age: 66
Discharge: HOME | End: 2021-11-18
Attending: NEUROLOGICAL SURGERY | Admitting: NEUROLOGICAL SURGERY
Payer: COMMERCIAL

## 2021-11-18 ENCOUNTER — ANESTHESIA (OUTPATIENT)
Dept: SURGERY | Facility: CLINIC | Age: 66
End: 2021-11-18
Payer: COMMERCIAL

## 2021-11-18 VITALS
HEIGHT: 65 IN | WEIGHT: 200 LBS | BODY MASS INDEX: 33.32 KG/M2 | TEMPERATURE: 99.1 F | OXYGEN SATURATION: 97 % | HEART RATE: 83 BPM

## 2021-11-18 DIAGNOSIS — S22.070G COMPRESSION FRACTURE OF T9 VERTEBRA WITH DELAYED HEALING, SUBSEQUENT ENCOUNTER: ICD-10-CM

## 2021-11-18 PROCEDURE — 258N000003 HC RX IP 258 OP 636: Performed by: ANESTHESIOLOGY

## 2021-11-18 PROCEDURE — 999N000141 HC STATISTIC PRE-PROCEDURE NURSING ASSESSMENT: Performed by: NEUROLOGICAL SURGERY

## 2021-11-18 PROCEDURE — 999N000001 HC CANCELLED SURGERY UP TO 15 MINS

## 2021-11-18 RX ORDER — SODIUM CHLORIDE, SODIUM LACTATE, POTASSIUM CHLORIDE, CALCIUM CHLORIDE 600; 310; 30; 20 MG/100ML; MG/100ML; MG/100ML; MG/100ML
INJECTION, SOLUTION INTRAVENOUS CONTINUOUS
Status: CANCELLED | OUTPATIENT
Start: 2021-11-18

## 2021-11-18 RX ORDER — ONDANSETRON 4 MG/1
4 TABLET, ORALLY DISINTEGRATING ORAL EVERY 30 MIN PRN
Status: CANCELLED | OUTPATIENT
Start: 2021-11-18

## 2021-11-18 RX ORDER — HYDROMORPHONE HCL IN WATER/PF 6 MG/30 ML
0.2 PATIENT CONTROLLED ANALGESIA SYRINGE INTRAVENOUS EVERY 5 MIN PRN
Status: CANCELLED | OUTPATIENT
Start: 2021-11-18

## 2021-11-18 RX ORDER — FENTANYL CITRATE 0.05 MG/ML
25 INJECTION, SOLUTION INTRAMUSCULAR; INTRAVENOUS EVERY 5 MIN PRN
Status: CANCELLED | OUTPATIENT
Start: 2021-11-18

## 2021-11-18 RX ORDER — SODIUM CHLORIDE, SODIUM LACTATE, POTASSIUM CHLORIDE, CALCIUM CHLORIDE 600; 310; 30; 20 MG/100ML; MG/100ML; MG/100ML; MG/100ML
INJECTION, SOLUTION INTRAVENOUS CONTINUOUS
Status: DISCONTINUED | OUTPATIENT
Start: 2021-11-18 | End: 2021-11-18 | Stop reason: HOSPADM

## 2021-11-18 RX ORDER — CEFAZOLIN SODIUM 2 G/100ML
2 INJECTION, SOLUTION INTRAVENOUS
Status: DISCONTINUED | OUTPATIENT
Start: 2021-11-18 | End: 2021-11-18 | Stop reason: HOSPADM

## 2021-11-18 RX ORDER — CEFAZOLIN SODIUM 2 G/100ML
2 INJECTION, SOLUTION INTRAVENOUS SEE ADMIN INSTRUCTIONS
Status: DISCONTINUED | OUTPATIENT
Start: 2021-11-18 | End: 2021-11-18 | Stop reason: HOSPADM

## 2021-11-18 RX ORDER — ONDANSETRON 2 MG/ML
4 INJECTION INTRAMUSCULAR; INTRAVENOUS EVERY 30 MIN PRN
Status: CANCELLED | OUTPATIENT
Start: 2021-11-18

## 2021-11-18 RX ORDER — OXYCODONE HYDROCHLORIDE 5 MG/1
5 TABLET ORAL EVERY 4 HOURS PRN
Status: CANCELLED | OUTPATIENT
Start: 2021-11-18

## 2021-11-18 RX ORDER — FENTANYL CITRATE 0.05 MG/ML
25 INJECTION, SOLUTION INTRAMUSCULAR; INTRAVENOUS
Status: CANCELLED | OUTPATIENT
Start: 2021-11-18

## 2021-11-18 RX ORDER — MEPERIDINE HYDROCHLORIDE 25 MG/ML
12.5 INJECTION INTRAMUSCULAR; INTRAVENOUS; SUBCUTANEOUS
Status: CANCELLED | OUTPATIENT
Start: 2021-11-18

## 2021-11-18 RX ADMIN — SODIUM CHLORIDE, POTASSIUM CHLORIDE, SODIUM LACTATE AND CALCIUM CHLORIDE: 600; 310; 30; 20 INJECTION, SOLUTION INTRAVENOUS at 11:49

## 2021-11-18 ASSESSMENT — ENCOUNTER SYMPTOMS: ORTHOPNEA: 0

## 2021-11-18 ASSESSMENT — MIFFLIN-ST. JEOR: SCORE: 1458.07

## 2021-11-18 ASSESSMENT — LIFESTYLE VARIABLES: TOBACCO_USE: 0

## 2021-11-18 NOTE — ANESTHESIA PREPROCEDURE EVALUATION
Anesthesia Pre-Procedure Evaluation    Patient: Rosy Jean   MRN: [id number] : 1955        Preoperative Diagnosis: Compression fracture of T9 vertebra with delayed healing, subsequent encounter [S22.070G]    Procedure : Procedure(s):  Thoracic 9 kyphoplasty          Past Medical History:   Diagnosis Date     Absence of menstruation      Bipolar I disorder, most recent episode (or current) manic, moderate (H)      Dermatophytosis of nail      FRACTURE RIB NOS-CLOSED 2008    May 2008     Major depressive disorder, recurrent episode, moderate (H)      Migraine, unspecified, without mention of intractable migraine without mention of status migrainosus      NONSPECIFIC MEDICAL HISTORY     Articulatory Defect due to conductive hearing loss     Other malaise and fatigue      Pain in joint, lower leg      Plantar fascial fibromatosis      PLANTAR FIBROMATOSIS           Past Surgical History:   Procedure Laterality Date     HC KNEE SCOPE,ABRASN ARTHROPLASTY  10/05     LIGATN/STRIP LONG OR SHORT SAPHEN        Allergies   Allergen Reactions     Shellfish Allergy Anaphylaxis      Social History     Tobacco Use     Smoking status: Former Smoker     Smokeless tobacco: Never Used     Tobacco comment: 1/2 cigarette once in a while for headaches   Substance Use Topics     Alcohol use: Yes     Comment: very rare      Wt Readings from Last 1 Encounters:   21 91.6 kg (202 lb)        Anesthesia Evaluation   Pt has had prior anesthetic.     No history of anesthetic complications       ROS/MED HX  ENT/Pulmonary:    (-) tobacco use, asthma and sleep apnea   Neurologic:     (+) migraines,     Cardiovascular: Comment: Nl echo 2009   (-) GAMEZ, orthopnea/PND and syncope   METS/Exercise Tolerance:     Hematologic:    (-) history of blood clots   Musculoskeletal: Comment: Compression fx      GI/Hepatic:    (-) GERD   Renal/Genitourinary:    (-) renal disease   Endo:     (+) Obesity,  (-) Type II DM    Psychiatric/Substance Use:     (+) psychiatric history depression and bipolar     Infectious Disease:    (-) Recent Fever   Malignancy:       Other:            Physical Exam    Airway        Mallampati: II   TM distance: > 3 FB   Neck ROM: full   Mouth opening: > 3 cm    Respiratory Devices and Support         Dental       (+) caps      Cardiovascular          Rhythm and rate: regular     Pulmonary           breath sounds clear to auscultation           OUTSIDE LABS:  CBC:   Lab Results   Component Value Date    WBC 5.0 02/09/2021    WBC 5.8 10/02/2020    HGB 12.0 02/09/2021    HGB 13.6 10/02/2020    HCT 37.5 02/09/2021    HCT 41.5 10/02/2020     02/09/2021     10/13/2020     BMP:   Lab Results   Component Value Date     02/09/2021     01/04/2021    POTASSIUM 3.9 02/09/2021    POTASSIUM 3.8 01/04/2021    CHLORIDE 109 02/09/2021    CHLORIDE 107 01/04/2021    CO2 30 02/09/2021    CO2 26 01/04/2021    BUN 11 02/09/2021    BUN 23 01/04/2021    CR 0.66 02/09/2021    CR 0.71 01/04/2021     (H) 02/09/2021    GLC 98 01/04/2021     COAGS: No results found for: PTT, INR, FIBR  POC:   Lab Results   Component Value Date    HCGS Negative 07/11/2008     HEPATIC:   Lab Results   Component Value Date    ALBUMIN 3.6 02/09/2021    PROTTOTAL 7.4 02/09/2021    ALT 71 (H) 02/09/2021    AST 30 02/09/2021    ALKPHOS 86 02/09/2021    BILITOTAL 0.2 02/09/2021    TYLER 14 10/13/2020     OTHER:   Lab Results   Component Value Date    A1C 5.4 02/09/2021    PEREZ 9.5 02/09/2021    TSH 1.70 01/04/2021     Prior to Admission medications    Medication Sig Start Date End Date Taking? Authorizing Provider   ARIPiprazole (ABILIFY) 30 MG tablet Take 1 tablet (30 mg) by mouth At Bedtime 7/1/21  Yes Yina Ham MD   lamoTRIgine (LAMICTAL) 200 MG tablet Take 1 tablet (200 mg) by mouth every morning 7/1/21  Yes Yina Ham MD   melatonin 3 MG tablet Take 2 tablets (6 mg) by mouth At Bedtime  Patient taking  differently: Take 6 mg by mouth nightly as needed for sleep  10/26/20  Yes Ciro Ley MD   Multiple Vitamin (DAILY MULTIVITAMIN PO) Take 1 tablet by mouth daily.   Yes Reported, Patient   OLANZapine (ZYPREXA) 5 MG tablet Take 1 tablet (5 mg) by mouth At Bedtime 7/1/21  Yes Yina Ham MD   Omega-3 Fatty Acids (FISH OIL PO) Take 1 capsule by mouth daily.   Yes Reported, Patient     Current Facility-Administered Medications Ordered in Epic   Medication Dose Route Frequency Last Rate Last Admin     ceFAZolin (ANCEF) intermittent infusion 2 g in 100 mL dextrose PRE-MIX  2 g Intravenous Pre-Op/Pre-procedure x 1 dose         ceFAZolin (ANCEF) intermittent infusion 2 g in 100 mL dextrose PRE-MIX  2 g Intravenous See Admin Instructions         lactated ringers infusion   Intravenous Continuous 25 mL/hr at 11/18/21 1149 New Bag at 11/18/21 1149     No current [facility]-ordered outpatient medications on file.       lactated ringers 25 mL/hr at 11/18/21 1149     No results for input(s): ABO, RH in the last 98280 hours.  No results for input(s): HCG in the last 93734 hours.  No results found for this or any previous visit (from the past 744 hour(s)).    Anesthesia Plan    ASA Status:  2      Anesthesia Type: General.     - Airway: ETT         Techniques and Equipment:     - Airway: Video-Laryngoscope         Consents    Anesthesia Plan(s) and associated risks, benefits, and realistic alternatives discussed. Questions answered and patient/representative(s) expressed understanding.    - Discussed:     - Discussed with:  Patient         Postoperative Care       PONV prophylaxis: Ondansetron (or other 5HT-3)     Comments:                Shayna Moss MD

## 2021-11-29 PROBLEM — M54.6 BILATERAL THORACIC BACK PAIN: Status: RESOLVED | Noted: 2021-08-26 | Resolved: 2021-11-29

## 2021-11-29 NOTE — PROGRESS NOTES
Subjective:  HPI  Physical Exam                    Objective:  System    Physical Exam    General     ROS    Assessment/Plan:    DISCHARGE REPORT    Progress reporting period is from 8/26/21 to 9/21/21     SUBJECTIVE  Subjective: I'm having less upper/mid back pain overall, doing the HEP some and have been wearing the back brace less often. I did have a MRI on my back last week and will see a specialist next wk.   Current Pain level: 0/10       Changes in function: Yes, see goal flow sheet for change in function   Adverse reactions: None;   ,     Patient has failed to return to therapy so current objective findings are unknown.  The subjective and objective information are from the last SOAP note on this patient.    OBJECTIVE  Objective: improved neutral sittingposture with verbal cues- tendency for FH rounded shld posture) instruct in scap stab with TB rows, thoracic EXT stretch and cerv EXT ROM supported- nga well, PT / mm tension R mid/lower T paraspinals/ rhomboid      ASSESSMENT/PLAN  Updated problem list and treatment plan: Diagnosis 1:  Thoracic pain    STG/LTGs have been met or progress has been made towards goals:    Assessment of Progress: The patient has not returned to therapy. Current status is unknown.  Self Management Plans:  Patient has been instructed in a home treatment program.  Patient  has been instructed in self management of symptoms.    Rosy continues to require the following intervention to meet STG and LTG's: PT  The patient failed to complete scheduled/ordered appointments so current information is unknown.  We will discharge this patient from PT.    Recommendations:  DC    Please refer to the daily flowsheet for treatment today, total treatment time and time spent performing 1:1 timed codes.

## 2021-12-16 ENCOUNTER — VIRTUAL VISIT (OUTPATIENT)
Dept: FAMILY MEDICINE | Facility: CLINIC | Age: 66
End: 2021-12-16
Payer: COMMERCIAL

## 2021-12-16 DIAGNOSIS — M80.00XD AGE-RELATED OSTEOPOROSIS WITH CURRENT PATHOLOGICAL FRACTURE WITH ROUTINE HEALING, SUBSEQUENT ENCOUNTER: Primary | ICD-10-CM

## 2021-12-16 DIAGNOSIS — Z91.013 SHELLFISH ALLERGY: ICD-10-CM

## 2021-12-16 DIAGNOSIS — Z12.31 ENCOUNTER FOR SCREENING MAMMOGRAM FOR BREAST CANCER: ICD-10-CM

## 2021-12-16 PROCEDURE — 99214 OFFICE O/P EST MOD 30 MIN: CPT | Mod: GT | Performed by: FAMILY MEDICINE

## 2021-12-16 RX ORDER — ARIPIPRAZOLE 15 MG/1
15 TABLET ORAL DAILY
COMMUNITY
Start: 2021-12-16

## 2021-12-16 RX ORDER — ALENDRONATE SODIUM 70 MG/1
70 TABLET ORAL
Qty: 4 TABLET | Refills: 11 | Status: SHIPPED | OUTPATIENT
Start: 2021-12-16 | End: 2023-01-30

## 2021-12-16 RX ORDER — EPINEPHRINE 0.3 MG/.3ML
0.3 INJECTION SUBCUTANEOUS ONCE
Qty: 0.6 ML | Refills: 0 | Status: SHIPPED | OUTPATIENT
Start: 2021-12-16 | End: 2021-12-16

## 2021-12-16 NOTE — PATIENT INSTRUCTIONS
Begin Fosamax after clearance by dentist with upcoming dental work. Take with large glass of water on empty stomach and remain upright without eating for 30 min after taking.      Continue weight bearing exercises and calcium and vitamin D supplement.    CALCIUM  Recommendations:  Teenagers and premenopausal women: 1200 mg/day  Pregnant and Lactating women: 1500 mg/day  Men and Postmenopausal women on estrogen: 1200mg/day  Postmenopausal women not on estrogen: 1500 mg/day    If you are not eating dairy products you also need 400 IU of vitamin D per day which can be obtained in either a multivitamin or in some of the Calcium tablets.    Dietary sources: These also contain vitamin D  Milk                            8 oz            300 mg  Yogurt                          1 cup           400 mg  Hard cheese                     1.5 oz          300 mg  Cottage cheese                  2 cup           300 mg  Orange juice with Calcium       8 oz            300 mg  Low fat dairy sources are recommended    Supplements:  Tums EX                         300 mg  Tums Ultra                      400 mg  Caltrate 600                    600 mg  Oscal                           500 mg  Oscal/D                         500 mg plus vitamin D  Women's Formula Multivitamin    450 mg      Follow up bone density in 1-2 years.    Mammogram recommended.   You can call [phone] to schedule this at the [facility] in [place]      Refill epipen for as needed use.

## 2021-12-16 NOTE — PROGRESS NOTES
Rosy is a 66 year old who is being evaluated via a billable video visit.      How would you like to obtain your AVS? MyChart  If the video visit is dropped, the invitation should be resent by: Send to e-mail at: [email or web address]  Will anyone else be joining your video visit? No    Video Start Time: 4:04 PM    Assessment & Plan     Age-related osteoporosis with current pathological fracture with routine healing, subsequent encounter  She is not a candidate for kyphoplasty but the back pain has improved with only mild residual discomfort noted. Treatment of the osteoporosis to prevent further fracture is planned. Prescription for Fosamax is given but we did discuss the importance of completing her upcoming dental work ( has plans for a crown and some fillings) prior to starting medication. She will discuss the plan to start the medication with her dentist and they will plan the dental work before she starts the medication. Follow-up bone density in 1 to 2 years recommended.  - alendronate (FOSAMAX) 70 MG tablet; Take 1 tablet (70 mg) by mouth every 7 days    Shellfish allergy  Refill EpiPen for food allergy.  - EPINEPHrine (ANY BX GENERIC EQUIV) 0.3 MG/0.3ML injection 2-pack; Inject 0.3 mLs (0.3 mg) into the muscle once for 1 dose    Encounter for screening mammogram for breast cancer  Mammogram recommended  - *MA Screening Digital Bilateral; Future    Ordering of each unique test  Prescription drug management         Patient Instructions   Begin Fosamax after clearance by dentist with upcoming dental work. Take with large glass of water on empty stomach and remain upright without eating for 30 min after taking.      Continue weight bearing exercises and calcium and vitamin D supplement.    CALCIUM  Recommendations:  Teenagers and premenopausal women: 1200 mg/day  Pregnant and Lactating women: 1500 mg/day  Men and Postmenopausal women on estrogen: 1200mg/day  Postmenopausal women not on estrogen: 1500  mg/day    If you are not eating dairy products you also need 400 IU of vitamin D per day which can be obtained in either a multivitamin or in some of the Calcium tablets.    Dietary sources: These also contain vitamin D  Milk                            8 oz            300 mg  Yogurt                          1 cup           400 mg  Hard cheese                     1.5 oz          300 mg  Cottage cheese                  2 cup           300 mg  Orange juice with Calcium       8 oz            300 mg  Low fat dairy sources are recommended    Supplements:  Tums EX                         300 mg  Tums Ultra                      400 mg  Caltrate 600                    600 mg  Oscal                           500 mg  Oscal/D                         500 mg plus vitamin D  Women's Formula Multivitamin    450 mg      Follow up bone density in 1-2 years.    Mammogram recommended.   You can call [phone] to schedule this at the [facility] in [place]      Refill epipen for as needed use.              Return in about 3 months (around 3/16/2022) for Physical Exam, Lab Work.    Yina Ham MD  [facility]    Tarah Gallegos is a 66 year old who presents for the following health issues     HPI        Age-related osteoporosis with current pathological fracture with routine healing, subsequent encounter - patient had back pain and was found to have multiple thoracic compression fractures with possibility of more acute changes at T9. She was evaluated with neurosurgery who gave her the options of allowing this to continue to heal and use of a back brace or consider kyphoplasty at T9. At the time of the proposed kyphoplasty was felt that the fracture had healed enough that this was not recommended. She reports currently that she is having some mild back pain intermittently but is not hindering her regular activities. Would like to discuss treatment for the osteoporosis. She has never been  treated for this in the past.     Shellfish allergy  -refill needed for EpiPen for as needed use. Has never had to use this medication. Needs an updated prescription.  Encounter for screening mammogram for breast cancer -discussed that her most recent mammogram was in November 2019. I would recommend she repeat this and an order is given for this with phone number provided to the patient. She denies any pain lumps or nipple discharge on questioning.      Review of Systems   Constitutional, HEENT, cardiovascular, pulmonary, gi and gu systems are negative, except as otherwise noted.      Objective           Vitals:  No vitals were obtained today due to virtual visit.    Physical Exam   GENERAL: Healthy, alert and no distress  EYES: Eyes grossly normal to inspection.  No discharge or erythema, or obvious scleral/conjunctival abnormalities.  RESP: No audible wheeze, cough, or visible cyanosis.  No visible retractions or increased work of breathing.    SKIN: Visible skin clear. No significant rash, abnormal pigmentation or lesions.  NEURO: Cranial nerves grossly intact.  Mentation and speech appropriate for age.  PSYCH: Mentation appears normal, affect normal/bright, judgement and insight intact, normal speech and appearance well-groomed.                Video-Visit Details    Type of service:  Video Visit    Video End Time:4:19 PM    Originating Location (pt. Location): Home    Distant Location (provider location):  [facility]     Platform used for Video Visit: Scancell

## 2022-03-06 ENCOUNTER — HEALTH MAINTENANCE LETTER (OUTPATIENT)
Age: 67
End: 2022-03-06

## 2022-03-21 NOTE — PLAN OF CARE
Problem: Manic Symptoms  Goal: Manic Symptoms  Description: Signs and symptoms of listed problems will be absent or manageable.  Outcome: No Change  Flowsheets (Taken 10/6/2020 1224)  Manic Symptoms Assessed: all  Manic Symptoms Present:    insight    speech    sleep    mood    thought process    affect     Problem: Manic Symptoms  Goal: Social and Therapeutic (Manic Symptoms)  Description: Signs and symptoms of listed problems will be absent or manageable.  Outcome: No Change   48 HOUR NURSING ASSESSMENT:    Pt remains hyper verbal, tangential and rambling. Pt continues to lack insight into her mental health. Pt reports that the main issue she is having is her husbands behavior. Pt is unable to identify her manic behavior. Pt is disorganized and disheveled at times. Pt has been medication compliant. Pt denies SI/SIB.   Pt's sleep continues to be poor - 3-3.5 hour a night.      Diabetes on insulin and metformin

## 2022-05-10 ENCOUNTER — TELEPHONE (OUTPATIENT)
Dept: FAMILY MEDICINE | Facility: CLINIC | Age: 67
End: 2022-05-10
Payer: COMMERCIAL

## 2022-05-10 DIAGNOSIS — M54.6 CHRONIC MIDLINE THORACIC BACK PAIN: ICD-10-CM

## 2022-05-10 DIAGNOSIS — G89.29 CHRONIC MIDLINE THORACIC BACK PAIN: ICD-10-CM

## 2022-05-10 DIAGNOSIS — M48.54XA COMPRESSION FRACTURE OF THORACIC SPINE, NON-TRAUMATIC, INITIAL ENCOUNTER (H): Primary | ICD-10-CM

## 2022-05-10 NOTE — TELEPHONE ENCOUNTER
Patient states she had previously had PT referral but did not follow up until now. She states when she tried to schedule the PT appointment she was told there is no longer a referral.     Patient requesting referral for PT. Routing to provider to place referral if appropriate.    Please call patient when this has been placed.     Bertha Carnes RN  [facility]

## 2022-05-11 NOTE — TELEPHONE ENCOUNTER
Please call patient RE:  PHYSICAL THERAPY referral.  This is sent in for her.  See # below if she does not hear from them for scheduling in the next 48-72 hours.        PSK    Referral Details    Referred By  Referred To   Yina Ham MD   [address]   [facility] [address]   Phone: [phone]   Fax: [phone]    Diagnoses: Compression fracture of thoracic spine, non-traumatic, initial encounter (H)   Chronic midline thoracic back pain   Order: Physical Therapy Referral       Comment: Please be aware that coverage of these services is subject to the terms and limitations of your health insurance plan.  Call member services at your health plan with any benefit or coverage questions.   If you have not heard from the scheduling office within 2 business days, please call [phone] for [employer], [phone] for [employer] and [phone] for [employer].

## 2022-05-12 ENCOUNTER — THERAPY VISIT (OUTPATIENT)
Dept: PHYSICAL THERAPY | Facility: CLINIC | Age: 67
End: 2022-05-12
Attending: FAMILY MEDICINE
Payer: COMMERCIAL

## 2022-05-12 DIAGNOSIS — M48.54XA COMPRESSION FRACTURE OF THORACIC SPINE, NON-TRAUMATIC, INITIAL ENCOUNTER (H): ICD-10-CM

## 2022-05-12 DIAGNOSIS — G89.29 CHRONIC MIDLINE THORACIC BACK PAIN: ICD-10-CM

## 2022-05-12 DIAGNOSIS — M54.6 CHRONIC MIDLINE THORACIC BACK PAIN: ICD-10-CM

## 2022-05-12 DIAGNOSIS — M54.6 BILATERAL THORACIC BACK PAIN: ICD-10-CM

## 2022-05-12 PROCEDURE — 97110 THERAPEUTIC EXERCISES: CPT | Mod: GP | Performed by: PHYSICAL THERAPIST

## 2022-05-12 PROCEDURE — 97161 PT EVAL LOW COMPLEX 20 MIN: CPT | Mod: GP | Performed by: PHYSICAL THERAPIST

## 2022-05-12 PROCEDURE — 97112 NEUROMUSCULAR REEDUCATION: CPT | Mod: GP | Performed by: PHYSICAL THERAPIST

## 2022-05-12 NOTE — PROGRESS NOTES
Physical Therapy Initial Evaluation  Subjective:  The history is provided by the patient. No  was used.   Patient Health History  Rosy Jean being seen for thoracic back pain, comp fractures .     Date of Onset: 5/10/22 MD order for PT.   Problem occurred: reports onset of thoracic pain in 12/2020 with a history of osteoporosis and thoracic compression fractures.( spring/ summer 2021) She c/o weakness, fatigue and soreness in the upper/mid back . She did have PT in 9/2021 and would like to progress with exercises to prevent further issues.    Pain is reported as 5/10 on pain scale.  General health as reported by patient is fair.  Pertinent medical history includes: depression, mental illness, migraines/headaches, overweight and other (osteoporosis).   Red flags:  None as reported by patient.  Medical allergies: none.   Surgeries include:  Orthopedic surgery and other (2 knee meniscus).    Current medications:  Anti-depressants.    Current occupation is retired.                     Therapist Generated HPI Evaluation         Type of problem:  Thoracic spine.    This is a chronic condition.  Condition occurred with:  Insidious onset.  Where condition occurred: for unknown reasons.  Patient reports pain:  Mid thoracic, thoracic right side and lower thoracic.  Pain is described as aching and is constant.  Pain is the same all the time.  Since onset symptoms are unchanged.  Symptoms are exacerbated by other and lifting (standing 10', bending)  and relieved by ice.  Special tests included:  Other (bone density scan).  Previous treatment includes physical therapy. There was mild improvement following previous treatment.  Barriers include:  None as reported by patient.                        Objective:  Standing Alignment:    Cervical/Thoracic:  Forward head, cervical lordosis increased and thoracic kyphosis increased  Shoulder/UE:  Rounded shoulders, protracted scapula L and protracted scapula  R  Lumbar:  Lordosis incr                Flexibility/Screens:     Upper Extremity:    Decreased left upper extremity flexibility at:  Pectoralis Major and Pectoralis Minor    Decreased right upper extremity flexibility present at:  Pectoralis Major and Pectoralis Minor    Spine:  Decreased left spine flexibility:  Upper Trap    Decreased right spine flexibility:  Upper Trap             Lumbar/SI Evaluation  ROM:    AROM Lumbar:   Flexion:        Hands to mid lower legs  Ext:                    Min loss   Side Bend:        Left:     Right:   Rotation:           Left:     Right:   Side Glide:        Left:     Right:         Strength: slouched sitting posture  Lumbar Myotomes:  normal                                 Cervical/Thoracic Evaluation    AROM:  AROM Cervical:    Flexion:            Mod loss  Extension:       Mod loss  Rotation:         Left: min loss     Right: min loss  Side Bend:      Left: min loss     Right:  Min loss  AROM Thoracic:    Flexion:               Min loss  Extension:          Mod loss  Rotation:            Left: min loss     Right: min  loss      Headaches: none  Cervical Myotomes:  normal                        Cervical Palpation:    Tenderness present at Left:    Rhomboids and Upper Trap  Tenderness present at Right:    Rhomboids and Upper Trap                                                  General     ROS    Assessment/Plan:    Patient is a 66 year old female with thoracic complaints.    Patient has the following significant findings with corresponding treatment plan.                Diagnosis 1:  Thoracic back pain, compression fractures  Pain -  hot/cold therapy, manual therapy, self management, education and home program  Decreased ROM/flexibility - therapeutic exercise and home program  Impaired muscle performance - neuro re-education and home program  Decreased function - therapeutic activities and home program  Impaired posture - neuro re-education and home program    Therapy  Evaluation Codes:   1) History comprised of:   Personal factors that impact the plan of care:      None.    Comorbidity factors that impact the plan of care are:      osteoporosis.     Medications impacting care: None.  2) Examination of Body Systems comprised of:   Body structures and functions that impact the plan of care:      Cervical spine, Lumbar spine and Thoracic Spine.   Activity limitations that impact the plan of care are:      Bending, Lifting, Standing and Walking.  3) Clinical presentation characteristics are:   Stable/Uncomplicated.  4) Decision-Making    Low complexity using standardized patient assessment instrument and/or measureable assessment of functional outcome.  Cumulative Therapy Evaluation is: Low complexity.    Previous and current functional limitations:  (See Goal Flow Sheet for this information)    Short term and Long term goals: (See Goal Flow Sheet for this information)     Communication ability:  Patient appears to be able to clearly communicate and understand verbal and written communication and follow directions correctly.  Treatment Explanation - The following has been discussed with the patient:   RX ordered/plan of care  Anticipated outcomes  Possible risks and side effects  This patient would benefit from PT intervention to resume normal activities.   Rehab potential is good.    Frequency:  1 X week, once daily  Duration:  for 6 weeks  Discharge Plan:  Achieve all LTG.  Independent in home treatment program.  Reach maximal therapeutic benefit.    Please refer to the daily flowsheet for treatment today, total treatment time and time spent performing 1:1 timed codes.

## 2022-05-17 NOTE — PROGRESS NOTES
Hepatitis C screening is negative/normal.  Please call or MyChart message me if you have any questions.   PSK ACP

## 2022-05-18 ENCOUNTER — THERAPY VISIT (OUTPATIENT)
Dept: PHYSICAL THERAPY | Facility: CLINIC | Age: 67
End: 2022-05-18
Payer: COMMERCIAL

## 2022-05-18 DIAGNOSIS — S22.000A COMPRESSION FRACTURE OF THORACIC VERTEBRA (H): ICD-10-CM

## 2022-05-18 DIAGNOSIS — M54.6 BILATERAL THORACIC BACK PAIN: Primary | ICD-10-CM

## 2022-05-18 PROCEDURE — 97110 THERAPEUTIC EXERCISES: CPT | Mod: GP | Performed by: PHYSICAL THERAPIST

## 2022-05-18 PROCEDURE — 97530 THERAPEUTIC ACTIVITIES: CPT | Mod: GP | Performed by: PHYSICAL THERAPIST

## 2022-05-18 PROCEDURE — 97140 MANUAL THERAPY 1/> REGIONS: CPT | Mod: GP | Performed by: PHYSICAL THERAPIST

## 2022-07-14 PROBLEM — S22.000A COMPRESSION FRACTURE OF THORACIC VERTEBRA (H): Status: RESOLVED | Noted: 2022-05-18 | Resolved: 2022-07-14

## 2022-07-14 PROBLEM — M54.6 BILATERAL THORACIC BACK PAIN: Status: RESOLVED | Noted: 2022-05-12 | Resolved: 2022-07-14

## 2022-07-14 NOTE — PROGRESS NOTES
Subjective:  HPI  Physical Exam                    Objective:  System    Physical Exam    General     ROS    Assessment/Plan:    DISCHARGE REPORT    Progress reporting period is from 5/12/22 to 5/18/22     SUBJECTIVE  Subjective: Rosy is doing her exercises every day and they seem to be going well. She does c/o fatigue in the mid back half way into her 1 mile walk. She has not been using the upper back brace much.   Current Pain level: 2/10       Changes in function: Yes, see goal flow sheet for change in function   Adverse reactions: None;   ,     Patient has failed to return to therapy so current objective findings are unknown.  The subjective and objective information are from the last SOAP note on this patient.    OBJECTIVE  Objective: Slouched sitting and standing posture with FH position. cervical retraction: NE during or after, increased ROM. Min PT /mm tension B rhomboids/thoracic/lumbar paraspinals. Instruct in prone arm raise #1- fatigue at 5 reps, prone leg raise- good technique with verbal cues. Fair TrA activation in supine with cues and fatigue ay 10 reps of core#3 and bridge #1.      ASSESSMENT/PLAN  Updated problem list and treatment plan: Diagnosis 1: thoracic back pain, compression fractures   STG/LTGs have been met or progress has been made towards goals:    Assessment of Progress: The patient has not returned to therapy. Current status is unknown.  Self Management Plans:  Patient has been instructed in a home treatment program.  Patient  has been instructed in self management of symptoms.    Rosy continues to require the following intervention to meet STG and LTG's: PT  The patient failed to complete scheduled/ordered appointments so current information is unknown.  We will discharge this patient from PT.    Recommendations:  Discharge from PT    Please refer to the daily flowsheet for treatment today, total treatment time and time spent performing 1:1 timed codes.
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
## 2022-08-21 ENCOUNTER — HEALTH MAINTENANCE LETTER (OUTPATIENT)
Age: 67
End: 2022-08-21

## 2022-09-26 ENCOUNTER — LAB (OUTPATIENT)
Dept: LAB | Facility: CLINIC | Age: 67
End: 2022-09-26
Payer: COMMERCIAL

## 2022-09-26 DIAGNOSIS — Z13.220 SCREENING FOR HYPERLIPIDEMIA: ICD-10-CM

## 2022-09-26 DIAGNOSIS — Z79.899 NEED FOR PROPHYLACTIC CHEMOTHERAPY: ICD-10-CM

## 2022-09-26 DIAGNOSIS — Z11.4 SCREENING FOR HIV (HUMAN IMMUNODEFICIENCY VIRUS): ICD-10-CM

## 2022-09-26 DIAGNOSIS — Z79.899 NEED FOR PROPHYLACTIC CHEMOTHERAPY: Primary | ICD-10-CM

## 2022-09-26 LAB
BASOPHILS # BLD AUTO: 0 10E3/UL (ref 0–0.2)
BASOPHILS NFR BLD AUTO: 1 %
EOSINOPHIL # BLD AUTO: 0 10E3/UL (ref 0–0.7)
EOSINOPHIL NFR BLD AUTO: 1 %
ERYTHROCYTE [DISTWIDTH] IN BLOOD BY AUTOMATED COUNT: 12.9 % (ref 10–15)
HBA1C MFR BLD: 5.2 % (ref 0–5.6)
HCT VFR BLD AUTO: 43.2 % (ref 35–47)
HGB BLD-MCNC: 14.1 G/DL (ref 11.7–15.7)
IMM GRANULOCYTES # BLD: 0 10E3/UL
IMM GRANULOCYTES NFR BLD: 0 %
LYMPHOCYTES # BLD AUTO: 1.9 10E3/UL (ref 0.8–5.3)
LYMPHOCYTES NFR BLD AUTO: 43 %
MCH RBC QN AUTO: 33 PG (ref 26.5–33)
MCHC RBC AUTO-ENTMCNC: 32.6 G/DL (ref 31.5–36.5)
MCV RBC AUTO: 101 FL (ref 78–100)
MONOCYTES # BLD AUTO: 0.4 10E3/UL (ref 0–1.3)
MONOCYTES NFR BLD AUTO: 9 %
NEUTROPHILS # BLD AUTO: 2.1 10E3/UL (ref 1.6–8.3)
NEUTROPHILS NFR BLD AUTO: 47 %
PLATELET # BLD AUTO: 222 10E3/UL (ref 150–450)
RBC # BLD AUTO: 4.27 10E6/UL (ref 3.8–5.2)
WBC # BLD AUTO: 4.5 10E3/UL (ref 4–11)

## 2022-09-26 PROCEDURE — 80175 DRUG SCREEN QUAN LAMOTRIGINE: CPT | Mod: 90

## 2022-09-26 PROCEDURE — 99000 SPECIMEN HANDLING OFFICE-LAB: CPT

## 2022-09-26 PROCEDURE — 80050 GENERAL HEALTH PANEL: CPT

## 2022-09-26 PROCEDURE — 36415 COLL VENOUS BLD VENIPUNCTURE: CPT

## 2022-09-26 PROCEDURE — 83036 HEMOGLOBIN GLYCOSYLATED A1C: CPT

## 2022-09-26 PROCEDURE — 80061 LIPID PANEL: CPT

## 2022-09-26 PROCEDURE — 82306 VITAMIN D 25 HYDROXY: CPT

## 2022-09-26 PROCEDURE — 87389 HIV-1 AG W/HIV-1&-2 AB AG IA: CPT

## 2022-09-27 LAB
ALBUMIN SERPL-MCNC: 4.1 G/DL (ref 3.4–5)
ALP SERPL-CCNC: 86 U/L (ref 40–150)
ALT SERPL W P-5'-P-CCNC: 34 U/L (ref 0–50)
ANION GAP SERPL CALCULATED.3IONS-SCNC: 6 MMOL/L (ref 3–14)
AST SERPL W P-5'-P-CCNC: 16 U/L (ref 0–45)
BILIRUB SERPL-MCNC: 0.4 MG/DL (ref 0.2–1.3)
BUN SERPL-MCNC: 13 MG/DL (ref 7–30)
CALCIUM SERPL-MCNC: 9.4 MG/DL (ref 8.5–10.1)
CHLORIDE BLD-SCNC: 109 MMOL/L (ref 94–109)
CHOLEST SERPL-MCNC: 232 MG/DL
CO2 SERPL-SCNC: 26 MMOL/L (ref 20–32)
CREAT SERPL-MCNC: 0.84 MG/DL (ref 0.52–1.04)
DEPRECATED CALCIDIOL+CALCIFEROL SERPL-MC: 32 UG/L (ref 20–75)
FASTING STATUS PATIENT QL REPORTED: NO
GFR SERPL CREATININE-BSD FRML MDRD: 77 ML/MIN/1.73M2
GLUCOSE BLD-MCNC: 95 MG/DL (ref 70–99)
HDLC SERPL-MCNC: 63 MG/DL
HIV 1+2 AB+HIV1 P24 AG SERPL QL IA: NONREACTIVE
LDLC SERPL CALC-MCNC: 152 MG/DL
NONHDLC SERPL-MCNC: 169 MG/DL
POTASSIUM BLD-SCNC: 4.5 MMOL/L (ref 3.4–5.3)
PROT SERPL-MCNC: 7.8 G/DL (ref 6.8–8.8)
SODIUM SERPL-SCNC: 141 MMOL/L (ref 133–144)
TRIGL SERPL-MCNC: 86 MG/DL
TSH SERPL DL<=0.005 MIU/L-ACNC: 1.92 MU/L (ref 0.4–4)

## 2022-09-28 ENCOUNTER — TELEPHONE (OUTPATIENT)
Dept: FAMILY MEDICINE | Facility: CLINIC | Age: 67
End: 2022-09-28

## 2022-09-28 LAB — LAMOTRIGINE SERPL-MCNC: 3.1 UG/ML

## 2022-09-28 NOTE — TELEPHONE ENCOUNTER
Patient calling to see if her lamictal level was done with this weeks lab work. This RN informed her it was done.     India Louise BSN, RN

## 2022-12-13 ENCOUNTER — ANCILLARY PROCEDURE (OUTPATIENT)
Dept: MAMMOGRAPHY | Facility: CLINIC | Age: 67
End: 2022-12-13
Attending: FAMILY MEDICINE
Payer: COMMERCIAL

## 2022-12-13 DIAGNOSIS — Z12.31 ENCOUNTER FOR SCREENING MAMMOGRAM FOR BREAST CANCER: ICD-10-CM

## 2022-12-13 PROCEDURE — 77067 SCR MAMMO BI INCL CAD: CPT | Mod: GC | Performed by: STUDENT IN AN ORGANIZED HEALTH CARE EDUCATION/TRAINING PROGRAM

## 2022-12-13 PROCEDURE — 77063 BREAST TOMOSYNTHESIS BI: CPT | Mod: GC | Performed by: STUDENT IN AN ORGANIZED HEALTH CARE EDUCATION/TRAINING PROGRAM

## 2023-01-29 ASSESSMENT — ENCOUNTER SYMPTOMS
ARTHRALGIAS: 0
JOINT SWELLING: 0
FEVER: 0
HEADACHES: 0
CONSTIPATION: 0
SORE THROAT: 0
EYE PAIN: 0
NAUSEA: 0
HEMATOCHEZIA: 0
NERVOUS/ANXIOUS: 1
DIZZINESS: 0
DIARRHEA: 0
SHORTNESS OF BREATH: 0
BREAST MASS: 0
ABDOMINAL PAIN: 0
COUGH: 0
FREQUENCY: 0
HEMATURIA: 0
DYSURIA: 0
CHILLS: 0
HEARTBURN: 0
PARESTHESIAS: 0
WEAKNESS: 0
MYALGIAS: 0
PALPITATIONS: 0

## 2023-01-29 ASSESSMENT — ACTIVITIES OF DAILY LIVING (ADL): CURRENT_FUNCTION: NO ASSISTANCE NEEDED

## 2023-01-30 ENCOUNTER — OFFICE VISIT (OUTPATIENT)
Dept: FAMILY MEDICINE | Facility: CLINIC | Age: 68
End: 2023-01-30
Payer: COMMERCIAL

## 2023-01-30 VITALS
HEIGHT: 63 IN | OXYGEN SATURATION: 99 % | BODY MASS INDEX: 35.53 KG/M2 | DIASTOLIC BLOOD PRESSURE: 85 MMHG | WEIGHT: 200.5 LBS | HEART RATE: 84 BPM | SYSTOLIC BLOOD PRESSURE: 127 MMHG | RESPIRATION RATE: 24 BRPM | TEMPERATURE: 97.9 F

## 2023-01-30 DIAGNOSIS — Z12.11 SCREEN FOR COLON CANCER: ICD-10-CM

## 2023-01-30 DIAGNOSIS — Z00.01 ENCOUNTER FOR GENERAL ADULT MEDICAL EXAMINATION WITH ABNORMAL FINDINGS: Primary | ICD-10-CM

## 2023-01-30 DIAGNOSIS — H91.93 BILATERAL HEARING LOSS, UNSPECIFIED HEARING LOSS TYPE: ICD-10-CM

## 2023-01-30 DIAGNOSIS — M48.54XD NON-TRAUMATIC COMPRESSION FRACTURE OF T9 THORACIC VERTEBRA WITH ROUTINE HEALING, SUBSEQUENT ENCOUNTER: ICD-10-CM

## 2023-01-30 DIAGNOSIS — F31.63 BIPOLAR 1 DISORDER, MIXED, SEVERE (H): ICD-10-CM

## 2023-01-30 DIAGNOSIS — M80.00XD AGE-RELATED OSTEOPOROSIS WITH CURRENT PATHOLOGICAL FRACTURE WITH ROUTINE HEALING, SUBSEQUENT ENCOUNTER: ICD-10-CM

## 2023-01-30 PROCEDURE — 99397 PER PM REEVAL EST PAT 65+ YR: CPT | Mod: 25 | Performed by: FAMILY MEDICINE

## 2023-01-30 PROCEDURE — 90677 PCV20 VACCINE IM: CPT | Performed by: FAMILY MEDICINE

## 2023-01-30 PROCEDURE — 99213 OFFICE O/P EST LOW 20 MIN: CPT | Mod: 25 | Performed by: FAMILY MEDICINE

## 2023-01-30 PROCEDURE — 90471 IMMUNIZATION ADMIN: CPT | Performed by: FAMILY MEDICINE

## 2023-01-30 RX ORDER — OLANZAPINE 5 MG/1
2.5 TABLET ORAL AT BEDTIME
Qty: 30 TABLET | Refills: 0 | COMMUNITY
Start: 2023-01-30

## 2023-01-30 RX ORDER — METFORMIN HCL 500 MG
1000 TABLET, EXTENDED RELEASE 24 HR ORAL
COMMUNITY
Start: 2023-01-27

## 2023-01-30 ASSESSMENT — ENCOUNTER SYMPTOMS
PALPITATIONS: 0
DIARRHEA: 0
CHILLS: 0
MYALGIAS: 0
JOINT SWELLING: 0
PARESTHESIAS: 0
FEVER: 0
HEMATOCHEZIA: 0
SORE THROAT: 0
SHORTNESS OF BREATH: 0
CONSTIPATION: 0
HEMATURIA: 0
WEAKNESS: 0
FREQUENCY: 0
ABDOMINAL PAIN: 0
BREAST MASS: 0
DIZZINESS: 0
EYE PAIN: 0
DYSURIA: 0
HEADACHES: 0
NERVOUS/ANXIOUS: 1
HEARTBURN: 0
ARTHRALGIAS: 0
COUGH: 0
NAUSEA: 0

## 2023-01-30 ASSESSMENT — PAIN SCALES - GENERAL: PAINLEVEL: NO PAIN (0)

## 2023-01-30 ASSESSMENT — PATIENT HEALTH QUESTIONNAIRE - PHQ9
SUM OF ALL RESPONSES TO PHQ QUESTIONS 1-9: 9
10. IF YOU CHECKED OFF ANY PROBLEMS, HOW DIFFICULT HAVE THESE PROBLEMS MADE IT FOR YOU TO DO YOUR WORK, TAKE CARE OF THINGS AT HOME, OR GET ALONG WITH OTHER PEOPLE: SOMEWHAT DIFFICULT
SUM OF ALL RESPONSES TO PHQ QUESTIONS 1-9: 9

## 2023-01-30 ASSESSMENT — ACTIVITIES OF DAILY LIVING (ADL): CURRENT_FUNCTION: NO ASSISTANCE NEEDED

## 2023-01-30 NOTE — PATIENT INSTRUCTIONS
Referral for hearing evaluation given.    For the bone density/compression fractures - since you have upcoming dental work that prevents use of the fosamax, I would recommend evaluation with endocrinology to see if there are other recommended treatment for your bones.      Collect and return the sample for colon cancer screening.    Check at [facility] to see if they have record of the second shingles vaccine and let me know.         Patient Education   Personalized Prevention Plan  You are due for the preventive services outlined below.  Your care team is available to assist you in scheduling these services.  If you have already completed any of these items, please share that information with your care team to update in your medical record.  Health Maintenance Due   Topic Date Due    LUNG CANCER SCREENING  Never done    Colorectal Cancer Screening  09/18/2018    Zoster (Shingles) Vaccine (2 of 2) 12/19/2019    Annual Wellness Visit  06/05/2022    Pneumococcal Vaccine (1 - PCV) Never done     Your Health Risk Assessment indicates you feel you are not in good health    A healthy lifestyle helps keep the body fit and the mind alert. It helps protect you from disease, helps you fight disease, and helps prevent chronic disease (disease that doesn't go away) from getting worse. This is important as you get older and begin to notice twinges in muscles and joints and a decline in the strength and stamina you once took for granted. A healthy lifestyle includes good healthcare, good nutrition, weight control, recreation, and regular exercise. Avoid harmful substances and do what you can to keep safe. Another part of a healthy lifestyle is stay mentally active and socially involved.    Good healthcare   Have a wellness visit every year.   If you have new symptoms, let us know right away. Don't wait until the next checkup.   Take medicines exactly as prescribed and keep your medicines in a safe place. Tell us if your medicine causes  problems.   Healthy diet and weight control   Eat 3 or 4 small, nutritious, low-fat, high-fiber meals a day. Include a variety of fruits, vegetables, and whole-grain foods.   Make sure you get enough calcium in your diet. Calcium, vitamin D, and exercise help prevent osteoporosis (bone thinning).   If you live alone, try eating with others when you can. That way you get a good meal and have company while you eat it.   Try to keep a healthy weight. If you eat more calories than your body uses for energy, it will be stored as fat and you will gain weight.     Recreation   Recreation is not limited to sports and team events. It includes any activity that provides relaxation, interest, enjoyment, and exercise. Recreation provides an outlet for physical, mental, and social energy. It can give a sense of worth and achievement. It can help you stay healthy.    Mental Exercise and Social Involvement  Mental and emotional health is as important as physical health. Keep in touch with friends and family. Stay as active as possible. Continue to learn and challenge yourself.   Things you can do to stay mentally active are:  Learn something new, like a foreign language or musical instrument.   Play SCRABBLE or do crossword puzzles. If you cannot find people to play these games with you at home, you can play them with others on your computer through the Internet.   Join a games club--anything from card games to chess or checkers or lawn bowling.   Start a new hobby.   Go back to school.   Volunteer.   Read.   Keep up with world events.    Understanding USDA MyPlate  The USDA has guidelines to help you make healthy food choices. These are called MyPlate. MyPlate shows the food groups that make up healthy meals using the image of a place setting. Before you eat, think about the healthiest choices for what to put on your plate or in your cup or bowl. To learn more about building a healthy plate, visit www.choosemyplate.gov.    The  food groups  Fruits. Any fruit or 100% fruit juice counts as part of the Fruit Group. Fruits may be fresh, canned, frozen, or dried, and may be whole, cut-up, or pureed. Make 1/2 of your plate fruits and vegetables.  Vegetables. Any vegetable or 100% vegetable juice counts as a member of the Vegetable Group. Vegetables may be fresh, frozen, canned, or dried. They can be served raw or cooked and may be whole, cut-up, or mashed. Make 1/2 of your plate fruits and vegetables.  Grains. All foods made from grains are part of the Grains Group. These include wheat, rice, oats, cornmeal, and barley. Grains are often used to make foods such as bread, pasta, oatmeal, cereal, tortillas, and grits. Grains should be no more than 1/4 of your plate. At least half of your grains should be whole grains.  Protein. This group includes meat, poultry, seafood, beans and peas, eggs, processed soy products (such as tofu), nuts (including nut butters), and seeds. Make protein choices no more than 1/4 of your plate. Meat and poultry choices should be lean or low fat.  Dairy. The Dairy Group includes all fluid milk products and foods made from milk that contain calcium, such as yogurt and cheese. (Foods that have little calcium, such as cream, butter, and cream cheese, are not part of this group.) Most dairy choices should be low-fat or fat-free.  Oils. Oils aren't a food group, but they do contain essential nutrients. However it's important to watch your intake of oils. These are fats that are liquid at room temperature. They include canola, corn, olive, soybean, vegetable, and sunflower oil. Foods that are mainly oil include mayonnaise, certain salad dressings, and soft margarines. You likely already get your daily oil allowance from the foods you eat.  Things to limit  Eating healthy also means limiting these things in your diet:     Salt (sodium). Many processed foods have a lot of sodium. To keep sodium intake down, eat fresh vegetables,  meats, poultry, and seafood when possible. Purchase low-sodium, reduced-sodium, or no-salt-added food products at the store. And don't add salt to your meals at home. Instead, season them with herbs and spices such as dill, oregano, cumin, and paprika. Or try adding flavor with lemon or lime zest and juice.  Saturated fat. Saturated fats are most often found in animal products such as beef, pork, and chicken. They are often solid at room temperature, such as butter. To reduce your saturated fat intake, choose leaner cuts of meat and poultry. And try healthier cooking methods such as grilling, broiling, roasting, or baking. For a simple lower-fat swap, use plain nonfat yogurt instead of mayonnaise when making potato salad or macaroni salad.  Added sugars. These are sugars added to foods. They are in foods such as ice cream, candy, soda, fruit drinks, sports drinks, energy drinks, cookies, pastries, jams, and syrups. Cut down on added sugars by sharing sweet treats with a family member or friend. You can also choose fruit for dessert, and drink water or other unsweetened beverages.     [employer] last reviewed this educational content on 6/1/2020 2000-2021 [employer]. All rights reserved. This information is not intended as a substitute for professional medical care. Always follow your healthcare professional's instructions.          Signs of Hearing Loss      Hearing much better with one ear can be a sign of hearing loss.   Hearing loss is a problem shared by many people. In fact, it is one of the most common health problems, particularly as people age. Most people age 65 and older have some hearing loss. By age 80, almost everyone does. Hearing loss often occurs slowly over the years. So you may not realize your hearing has gotten worse.  Have your hearing checked  Call your healthcare provider if you:  Have to strain to hear normal conversation  Have to watch other people s faces very carefully to  follow what they re saying  Need to ask people to repeat what they ve said  Often misunderstand what people are saying  Turn the volume of the television or radio up so high that others complain  Feel that people are mumbling when they re talking to you  Find that the effort to hear leaves you feeling tired and irritated  Notice, when using the phone, that you hear better with one ear than the other  [employer] last reviewed this educational content on 1/1/2020 2000-2021 [employer]. All rights reserved. This information is not intended as a substitute for professional medical care. Always follow your healthcare professional's instructions.        Your Health Risk Assessment indicates you feel you are not in good emotional health.    Recreation   Recreation is not limited to sports and team events. It includes any activity that provides relaxation, interest, enjoyment, and exercise. Recreation provides an outlet for physical, mental, and social energy. It can give a sense of worth and achievement. It can help you stay healthy.    Mental Exercise and Social Involvement  Mental and emotional health is as important as physical health. Keep in touch with friends and family. Stay as active as possible. Continue to learn and challenge yourself.   Things you can do to stay mentally active are:  Learn something new, like a foreign language or musical instrument.   Play SCRABBLE or do crossword puzzles. If you cannot find people to play these games with you at home, you can play them with others on your computer through the Internet.   Join a games club--anything from card games to chess or checkers or lawn bowling.   Start a new hobby.   Go back to school.   Volunteer.   Read.   Keep up with world events.    Depression and Suicide in Older Adults    Nearly 2 million older Americans have some type of depression. Some of them even take their own lives. Yet depression among older adults is often ignored. Learn  "the warning signs. You may help spare a loved one needless pain. You may also save a life.   What is depression?  Depression is a common and serious illness that affects the way you think and feel. It is not a normal part of aging, nor is it a sign of weakness, a character flaw, or something you can snap out of. Most people with depression need treatment to get better. The most common symptom is a feeling of deep sadness. People who are depressed also may seem tired and listless. And nothing seems to give them pleasure. It s normal to grieve or be sad sometimes. But sadness lessens or passes with time. Depression rarely goes away or improves on its own. A person with clinical depression can't \"snap out of it.\" Other symptoms of depression are:   Sleeping more or less than normal  Eating more or less than normal  Having headaches, stomachaches, or other pains that don t go away  Feeling nervous,  empty,  or worthless  Crying a great deal  Thinking or talking about suicide or death  Loss of interest in activities previously enjoyed  Social isolation  Feeling confused or forgetful  What causes it?  The causes of depression aren t fully known. But it is thought to result from a complex blend of these factors:   Biochemistry. Certain chemicals in the brain play a role.  Genes. Depression does run in families.  Life stress. Life stresses can also trigger depression in some people. Older adults often face many stressors, such as death of friends or a spouse, health problems, and financial concerns.  Chronic conditions. This includes conditions such as diabetes, heart disease, or cancer. These can cause symptoms of depression. Medicine side effects can cause changes in thoughts and behaviors.  How you can help  Often, depressed people may not want to ask for help. When they do, they may be ignored. Or, they may receive the wrong treatment. You can help by showing parents and older friends love and support. If they seem " depressed, don t lecture the person, ignore the symptoms, or discount the symptoms as a  normal  part of aging -which they are not. Get involved, listen, and show interest and support.   Help them understand that depression is a treatable illness. Tell them you can help them find the right treatment. Offer to go to their healthcare provider's appointment with them for support when the symptoms are discussed. With their approval, contact a local mental health center, social service agency, or hospital about services.   You can be an advocate for him or her at healthcare appointments. Many older adults have chronic illnesses that can cause symptoms of depression. Medicine side effects can change thoughts and behaviors. You can help make sure that the healthcare provider looks at all of these factors. He or she should refer your family member or friend to a mental healthcare provider when needed. in some cases, untreated depression can lead to a misdiagnosis. A person may be diagnosed with a brain disorder such as dementia. If the healthcare provider does not take the issue of depression seriously, help your family member or friend to find another provider.   Don't be afraid to ask  If you think an older person you care about could be suicidal, ask,  Have you thought about suicide?  Most people will tell you the truth. If they say  yes,  they may already have a plan for how and when they will attempt it. Find out as much as you can. The more detailed the plan, and the easier it is to carry out, the more danger the person is in right now. Tell the person you are there for them and do not want them to harm him or herself. Don't wait to get help for the person. Call the person's healthcare provider, local hospital, or emergency services.   To learn more  National Suicide Prevention Lifeline (crisis hotline) 823-050-SYAH (550-885-8077)  National [place] of Mental Atrara966-010-0443trd.[facility].nih.gov  National [place] on  Mental Bcpkfah454-261-6923joi.oswaldo.org  Mental Health Yhyahqt403-938-0475pqy.[facility].org  National Suicide Pxpwpkt014-KUXTELK (686-778-2308)    Call 911  Never leave the person alone. A person who is actively suicidal needs psychiatric care right away. They will need constant supervision. Never leave the person out of sight. Call 911 or the national 24-hour suicide crisis hotline at 655-555-[facility] (725-991-1059). You can also take the person to the closest emergency room.   [employer]il[employer] last reviewed this educational content on 5/1/2020 2000-2021 [employer]. All rights reserved. This information is not intended as a substitute for professional medical care. Always follow your healthcare professional's instructions.

## 2023-02-15 PROCEDURE — 82274 ASSAY TEST FOR BLOOD FECAL: CPT | Performed by: FAMILY MEDICINE

## 2023-02-16 LAB — HEMOCCULT STL QL IA: NEGATIVE

## 2023-08-22 ENCOUNTER — OFFICE VISIT (OUTPATIENT)
Dept: ENDOCRINOLOGY | Facility: CLINIC | Age: 68
End: 2023-08-22
Attending: FAMILY MEDICINE
Payer: COMMERCIAL

## 2023-08-22 VITALS
RESPIRATION RATE: 18 BRPM | DIASTOLIC BLOOD PRESSURE: 74 MMHG | BODY MASS INDEX: 33.46 KG/M2 | SYSTOLIC BLOOD PRESSURE: 110 MMHG | HEART RATE: 78 BPM | WEIGHT: 191.2 LBS | OXYGEN SATURATION: 95 %

## 2023-08-22 DIAGNOSIS — M81.0 AGE-RELATED OSTEOPOROSIS WITHOUT CURRENT PATHOLOGICAL FRACTURE: Primary | ICD-10-CM

## 2023-08-22 DIAGNOSIS — M48.54XD NON-TRAUMATIC COMPRESSION FRACTURE OF T9 THORACIC VERTEBRA WITH ROUTINE HEALING, SUBSEQUENT ENCOUNTER: ICD-10-CM

## 2023-08-22 DIAGNOSIS — M80.00XD AGE-RELATED OSTEOPOROSIS WITH CURRENT PATHOLOGICAL FRACTURE WITH ROUTINE HEALING, SUBSEQUENT ENCOUNTER: ICD-10-CM

## 2023-08-22 LAB
ALBUMIN SERPL BCG-MCNC: 4.5 G/DL (ref 3.5–5.2)
BUN SERPL-MCNC: 14.5 MG/DL (ref 8–23)
CALCIUM SERPL-MCNC: 9.7 MG/DL (ref 8.8–10.2)
CREAT SERPL-MCNC: 1.01 MG/DL (ref 0.51–0.95)
GFR SERPL CREATININE-BSD FRML MDRD: 61 ML/MIN/1.73M2
PHOSPHATE SERPL-MCNC: 3.3 MG/DL (ref 2.5–4.5)
TSH SERPL DL<=0.005 MIU/L-ACNC: 0.87 UIU/ML (ref 0.3–4.2)

## 2023-08-22 PROCEDURE — 82040 ASSAY OF SERUM ALBUMIN: CPT | Performed by: INTERNAL MEDICINE

## 2023-08-22 PROCEDURE — 84100 ASSAY OF PHOSPHORUS: CPT | Performed by: INTERNAL MEDICINE

## 2023-08-22 PROCEDURE — 99244 OFF/OP CNSLTJ NEW/EST MOD 40: CPT | Performed by: INTERNAL MEDICINE

## 2023-08-22 PROCEDURE — 82306 VITAMIN D 25 HYDROXY: CPT | Performed by: INTERNAL MEDICINE

## 2023-08-22 PROCEDURE — 84520 ASSAY OF UREA NITROGEN: CPT | Performed by: INTERNAL MEDICINE

## 2023-08-22 PROCEDURE — 82310 ASSAY OF CALCIUM: CPT | Performed by: INTERNAL MEDICINE

## 2023-08-22 PROCEDURE — 84443 ASSAY THYROID STIM HORMONE: CPT | Performed by: INTERNAL MEDICINE

## 2023-08-22 PROCEDURE — 82565 ASSAY OF CREATININE: CPT | Performed by: INTERNAL MEDICINE

## 2023-08-22 PROCEDURE — 36415 COLL VENOUS BLD VENIPUNCTURE: CPT | Performed by: INTERNAL MEDICINE

## 2023-08-22 PROCEDURE — 83970 ASSAY OF PARATHORMONE: CPT | Performed by: INTERNAL MEDICINE

## 2023-08-22 PROCEDURE — 86364 TISS TRNSGLTMNASE EA IG CLAS: CPT | Performed by: INTERNAL MEDICINE

## 2023-08-22 ASSESSMENT — PAIN SCALES - GENERAL: PAINLEVEL: NO PAIN (1)

## 2023-08-22 NOTE — LETTER
8/22/2023         RE: Rosy Jean  [address]  [place] [facility] [facility] [address]        Dear Colleague,    Thank you for referring your patient, Rosy Jean, to the [facility]. Please see a copy of my visit note below.    Endocrinology Clinic Visit    Chief Complaint: New Patient (osteoperosis)     Information obtained from:Patient      Assessment/Treatment Plan:    Ongoing osteoporosis  Fragility fracture of T9 thoracic vertebrae  Scoliosis      I have personally reviewed bone density from 10/5/2021 and agree with the interpretation of lumbar spine T score -3.3, radius 33% T score -3.1 and left and right femoral neck T-scores -1.9 and -1.3 respectively.  Mean total hip T score -1.7.  Strong family history of osteoporosis seems to be the risk factor in addition to age.  In order to assess other secondary causes of osteoporosis, will check the following labs including 24-hour urine collection for cortisol and calcium.  Further plan based on lab results.  In the meantime, weight bearing exercises, fall prevention, calcium 1200 mg from all sources and continue current vitamin D supplement.  We discussed bone specific therapy particularly Evenity and Prolia.  I discussed risk of Evenity including cardiovascular risk and patient would like to to proceed with Prolia injection at this time.  Risk of this medication including osteonecrosis of the jaw, pneumonia, atypical fractures, hypocalcemia and others discussed and written information provided.  We will place an order for Prolia injection after work-up is completed.  Test and/or medications prescribed today:  Orders Placed This Encounter   Procedures     Dexa hip/pelvis/spine     Dexa Wrist/Heel     Vitamin D Deficiency (D3 Only)     Albumin level     Phosphorus     Parathyroid Hormone Intact     Calcium     Urea nitrogen     Creatinine     Creatinine timed urine     Cortisol free urine     Calcium timed urine     Tissue  transglutaminase srinath IgA and IgG     TSH with free T4 reflex         Stewart Gagnon MD  Staff Endocrinologist    Division of Endocrinology and Diabetes      Subjective:         HPI: Rosy Jean is a 68 year old female with history of osteoporosis who is seen in consultation at Yina Ham's request for for the same.     Fracture risk and osteoporosis  Previous fracture after the age of 50 - Yes, thoracic spine   Age >65 - Yes   Loss of height - 3 inches/'hunched back'   Low body mass index; weight less than 127 pounds; no   Family/Parental history of hip fracture/Osteoporosis: Mom and aunts, arm/leg # in Mom   Smoking - no   Early menopause - 45-55  Previous fragility fracture, morphometric vertebral fracture: yes  Current glucocorticoid treatment: >5 mg prednisone for 3 mo or longer; current or previously - no   Excessive Alcohol intake; no   Falls/risk factors - no     Spine image/ if height loss: Yes    Secondary causes of osteoporosis: no RA, no DM, no Prolonged immobility, no Organ transplantation   Calcium and vitamin D intake  Exercise   Lithium, Zyprexa, tegretol   Dental procedure planned.     Allergies   Allergen Reactions     Shellfish Allergy Anaphylaxis       Current Outpatient Medications   Medication Sig Dispense Refill     ARIPiprazole (ABILIFY) 15 MG tablet Take 1 tablet (15 mg) by mouth daily       calcium citrate-vitamin D (CITRACAL) 200-6.25 MG-MCG TABS per tablet Take 2 tablets by mouth daily       lamoTRIgine (LAMICTAL) 200 MG tablet Take 1 tablet (200 mg) by mouth every morning 30 tablet 0     melatonin 3 MG tablet Take 2 tablets (6 mg) by mouth At Bedtime (Patient taking differently: Take 6 mg by mouth nightly as needed for sleep) 60 tablet 1     metFORMIN (GLUCOPHAGE XR) 500 MG 24 hr tablet Take 1,000 mg by mouth Once daily in evening       Multiple Vitamin (DAILY MULTIVITAMIN PO) Take 1 tablet by mouth daily.       OLANZapine (ZYPREXA) 5 MG tablet Take 0.5 tablets (2.5 mg)  by mouth At Bedtime 30 tablet 0     Omega-3 Fatty Acids (FISH OIL PO) Take 1 capsule by mouth daily.         Review of Systems     8 point review system (Constitutional, HENT, Eyes, Respiratory, Cardiovascular, Gastrointestinal, Genitourinary, Musculoskeletal,Neurological, Psychiatric/Behavioural, Endocrine) is negative or is as per HPI above.  Past medical history, past surgical history, family and social history pertinent to the visit reviewed in detail.    Objective:   /74 (BP Location: Left arm, Patient Position: Sitting, Cuff Size: Adult Large)   Pulse 78   Resp 18   Wt 86.7 kg (191 lb 3.2 oz)   LMP 12/07/2007   SpO2 95%   BMI 33.46 kg/m    Constitutional: Pleasant no acute cardiopulmonary distress.   EYES: anicteric, normal extra-ocular movements, no lid lag or retraction, is equal and reactive to light bilaterally.  HEENT: Mouth/Throat: Mucous membrane is moist. Oropharynx is clear.    Cardiovascular: RRR, S1, S2 normal.   Pulmonary/Chest: CTAB. No wheezing or rales.   Abdominal: +BS. Non tender to palpation.  Stretch marks: No significant.  Neurological: Alert and oriented.  No tremor and reflexes are symmetrical bilaterally and within the normal limits. Muscle strength 5/5.   Extremities: No edema.  Spine:  significant scoliosis.  No point tenderness.  Psychological: appropriate mood and affect    In House Labs:   Lab Results   Component Value Date    A1C 5.2 09/26/2022    A1C 5.4 02/09/2021    A1C 5.6 06/13/2017       TSH   Date Value Ref Range Status   08/22/2023 0.87 0.30 - 4.20 uIU/mL Final   09/26/2022 1.92 0.40 - 4.00 mU/L Final   01/04/2021 1.70 0.40 - 4.00 mU/L Final   10/02/2020 1.63 0.40 - 4.00 mU/L Final   03/24/2014 1.49 0.4 - 5.0 mU/L Final   07/14/2008 1.72 0.4 - 5.0 mU/L Final   07/11/2008 1.32 0.4 - 5.0 mU/L Final       Creatinine   Date Value Ref Range Status   08/22/2023 1.01 (H) 0.51 - 0.95 mg/dL Final   02/09/2021 0.66 0.52 - 1.04 mg/dL Final   ]  Component      Latest Ref  Rng 8/22/2023  3:36 PM   Creatinine      0.51 - 0.95 mg/dL 1.01 (H)    GFR Estimate      >60 mL/min/1.73m2 61    Albumin      3.5 - 5.2 g/dL 4.5    Phosphorus      2.5 - 4.5 mg/dL 3.3    Calcium      8.8 - 10.2 mg/dL 9.7    Urea Nitrogen      8.0 - 23.0 mg/dL 14.5    TSH      0.30 - 4.20 uIU/mL 0.87      This note has been dictated using voice recognition software.  As a result, there may be errors in the documentation that have gone undetected.  Please consider this when interpreting information in this documentation.         Again, thank you for allowing me to participate in the care of your patient.        Sincerely,        Stewart Gagnon MD

## 2023-08-22 NOTE — PROGRESS NOTES
Endocrinology Clinic Visit    Chief Complaint: New Patient (osteoperosis)     Information obtained from:Patient      Assessment/Treatment Plan:    Ongoing osteoporosis  Fragility fracture of T9 thoracic vertebrae  Scoliosis      I have personally reviewed bone density from 10/5/2021 and agree with the interpretation of lumbar spine T score -3.3, radius 33% T score -3.1 and left and right femoral neck T-scores -1.9 and -1.3 respectively.  Mean total hip T score -1.7.  Strong family history of osteoporosis seems to be the risk factor in addition to age.  In order to assess other secondary causes of osteoporosis, will check the following labs including 24-hour urine collection for cortisol and calcium.  Further plan based on lab results.  In the meantime, weight bearing exercises, fall prevention, calcium 1200 mg from all sources and continue current vitamin D supplement.  We discussed bone specific therapy particularly Evenity and Prolia.  I discussed risk of Evenity including cardiovascular risk and patient would like to to proceed with Prolia injection at this time.  Risk of this medication including osteonecrosis of the jaw, pneumonia, atypical fractures, hypocalcemia and others discussed and written information provided.  We will place an order for Prolia injection after work-up is completed.  Test and/or medications prescribed today:  Orders Placed This Encounter   Procedures    Dexa hip/pelvis/spine    Dexa Wrist/Heel    Vitamin D Deficiency (D3 Only)    Albumin level    Phosphorus    Parathyroid Hormone Intact    Calcium    Urea nitrogen    Creatinine    Creatinine timed urine    Cortisol free urine    Calcium timed urine    Tissue transglutaminase srinath IgA and IgG    TSH with free T4 reflex         Stewart Gagnon MD  Staff Endocrinologist    Division of Endocrinology and Diabetes      Subjective:         HPI: Rosy Jean is a 68 year old female with history of osteoporosis who is seen in  consultation at Yina Ham's request for for the same.     Fracture risk and osteoporosis  Previous fracture after the age of 50 - Yes, thoracic spine   Age >65 - Yes   Loss of height - 3 inches/'hunched back'   Low body mass index; weight less than 127 pounds; no   Family/Parental history of hip fracture/Osteoporosis: Mom and aunts, arm/leg # in Mom   Smoking - no   Early menopause - 45-55  Previous fragility fracture, morphometric vertebral fracture: yes  Current glucocorticoid treatment: >5 mg prednisone for 3 mo or longer; current or previously - no   Excessive Alcohol intake; no   Falls/risk factors - no     Spine image/ if height loss: Yes    Secondary causes of osteoporosis: no RA, no DM, no Prolonged immobility, no Organ transplantation   Calcium and vitamin D intake  Exercise   Lithium, Zyprexa, tegretol   Dental procedure planned.     Allergies   Allergen Reactions    Shellfish Allergy Anaphylaxis       Current Outpatient Medications   Medication Sig Dispense Refill    ARIPiprazole (ABILIFY) 15 MG tablet Take 1 tablet (15 mg) by mouth daily      calcium citrate-vitamin D (CITRACAL) 200-6.25 MG-MCG TABS per tablet Take 2 tablets by mouth daily      lamoTRIgine (LAMICTAL) 200 MG tablet Take 1 tablet (200 mg) by mouth every morning 30 tablet 0    melatonin 3 MG tablet Take 2 tablets (6 mg) by mouth At Bedtime (Patient taking differently: Take 6 mg by mouth nightly as needed for sleep) 60 tablet 1    metFORMIN (GLUCOPHAGE XR) 500 MG 24 hr tablet Take 1,000 mg by mouth Once daily in evening      Multiple Vitamin (DAILY MULTIVITAMIN PO) Take 1 tablet by mouth daily.      OLANZapine (ZYPREXA) 5 MG tablet Take 0.5 tablets (2.5 mg) by mouth At Bedtime 30 tablet 0    Omega-3 Fatty Acids (FISH OIL PO) Take 1 capsule by mouth daily.         Review of Systems     8 point review system (Constitutional, HENT, Eyes, Respiratory, Cardiovascular, Gastrointestinal, Genitourinary, Musculoskeletal,Neurological,  Psychiatric/Behavioural, Endocrine) is negative or is as per HPI above.  Past medical history, past surgical history, family and social history pertinent to the visit reviewed in detail.    Objective:   /74 (BP Location: Left arm, Patient Position: Sitting, Cuff Size: Adult Large)   Pulse 78   Resp 18   Wt 86.7 kg (191 lb 3.2 oz)   LMP 12/07/2007   SpO2 95%   BMI 33.46 kg/m    Constitutional: Pleasant no acute cardiopulmonary distress.   EYES: anicteric, normal extra-ocular movements, no lid lag or retraction, is equal and reactive to light bilaterally.  HEENT: Mouth/Throat: Mucous membrane is moist. Oropharynx is clear.    Cardiovascular: RRR, S1, S2 normal.   Pulmonary/Chest: CTAB. No wheezing or rales.   Abdominal: +BS. Non tender to palpation.  Stretch marks: No significant.  Neurological: Alert and oriented.  No tremor and reflexes are symmetrical bilaterally and within the normal limits. Muscle strength 5/5.   Extremities: No edema.  Spine:  significant scoliosis.  No point tenderness.  Psychological: appropriate mood and affect    In House Labs:   Lab Results   Component Value Date    A1C 5.2 09/26/2022    A1C 5.4 02/09/2021    A1C 5.6 06/13/2017       TSH   Date Value Ref Range Status   08/22/2023 0.87 0.30 - 4.20 uIU/mL Final   09/26/2022 1.92 0.40 - 4.00 mU/L Final   01/04/2021 1.70 0.40 - 4.00 mU/L Final   10/02/2020 1.63 0.40 - 4.00 mU/L Final   03/24/2014 1.49 0.4 - 5.0 mU/L Final   07/14/2008 1.72 0.4 - 5.0 mU/L Final   07/11/2008 1.32 0.4 - 5.0 mU/L Final       Creatinine   Date Value Ref Range Status   08/22/2023 1.01 (H) 0.51 - 0.95 mg/dL Final   02/09/2021 0.66 0.52 - 1.04 mg/dL Final   ]  Component      Latest Ref Rng 8/22/2023  3:36 PM   Creatinine      0.51 - 0.95 mg/dL 1.01 (H)    GFR Estimate      >60 mL/min/1.73m2 61    Albumin      3.5 - 5.2 g/dL 4.5    Phosphorus      2.5 - 4.5 mg/dL 3.3    Calcium      8.8 - 10.2 mg/dL 9.7    Urea Nitrogen      8.0 - 23.0 mg/dL 14.5    TSH       0.30 - 4.20 uIU/mL 0.87      This note has been dictated using voice recognition software.  As a result, there may be errors in the documentation that have gone undetected.  Please consider this when interpreting information in this documentation.

## 2023-08-22 NOTE — NURSING NOTE
Rosy Jean's goals for this visit include: NONE  She requests these members of her care team be copied on today's visit information: YES    PCP: Yina Ham    Referring Provider:  Yina Ham MD  [address]  [place]  [place] [address]    /74 (BP Location: Left arm, Patient Position: Sitting, Cuff Size: Adult Large)   Pulse 78   Resp 18   Wt 86.7 kg (191 lb 3.2 oz)   LMP 12/07/2007   SpO2 95%   BMI 33.46 kg/m      Do you need any medication refills at today's visit? NONE    Andrew Lloyd, EMT

## 2023-08-22 NOTE — PATIENT INSTRUCTIONS
Weight bearing Exercises  Fall prevention  Adequate Calcium and vitamin D intake: for maintenance calcium 1200 mg daily from all sources (calcium supplement increase to 3 tablets per day) and vitamin D.     Orders Placed This Encounter   Procedures    Vitamin D Deficiency (D3 Only)    Albumin level    Phosphorus    Parathyroid Hormone Intact    Calcium    Urea nitrogen    Creatinine    Creatinine timed urine    Cortisol free urine    Calcium timed urine    Tissue transglutaminase srinath IgA and IgG    TSH with free T4 reflex   24 hour urine collection to check calcium.   a day; if Sunday discard the first urine on Sunday morning and collect all other urine all day Sunday and night; including morning urine of Monday morning. Bring it back to the laboratory Monday morning after completion of urine collection.

## 2023-08-23 LAB
DEPRECATED CALCIDIOL+CALCIFEROL SERPL-MC: 31 UG/L (ref 20–75)
PTH-INTACT SERPL-MCNC: 57 PG/ML (ref 15–65)

## 2023-08-24 LAB
TTG IGA SER-ACNC: 0.5 U/ML
TTG IGG SER-ACNC: <0.6 U/ML

## 2023-08-25 ENCOUNTER — APPOINTMENT (OUTPATIENT)
Dept: LAB | Facility: CLINIC | Age: 68
End: 2023-08-25
Payer: COMMERCIAL

## 2023-08-25 LAB
CALCIUM 24H UR-MRATE: 0.25 G/SPEC (ref 0.1–0.3)
CALCIUM UR-MCNC: 21 MG/DL
COLLECT DURATION TIME UR: 24 H
COLLECT DURATION TIME UR: 24 H
CREAT 24H UR-MRATE: 1.41 G/SPEC (ref 0.72–1.51)
CREAT UR-MCNC: 120 MG/DL
SPECIMEN VOL UR: 1175 ML
SPECIMEN VOL UR: 1175 ML

## 2023-08-25 PROCEDURE — 82530 CORTISOL FREE: CPT | Mod: 90 | Performed by: INTERNAL MEDICINE

## 2023-08-25 PROCEDURE — 82340 ASSAY OF CALCIUM IN URINE: CPT | Performed by: INTERNAL MEDICINE

## 2023-08-25 PROCEDURE — 81050 URINALYSIS VOLUME MEASURE: CPT | Performed by: INTERNAL MEDICINE

## 2023-08-25 PROCEDURE — 99000 SPECIMEN HANDLING OFFICE-LAB: CPT | Performed by: INTERNAL MEDICINE

## 2023-08-25 PROCEDURE — 82570 ASSAY OF URINE CREATININE: CPT | Performed by: INTERNAL MEDICINE

## 2023-08-31 LAB
ANNOTATION COMMENT IMP: ABNORMAL
CORTIS F 24H UR HPLC-MCNC: 26.7 UG/L
CORTIS F 24H UR-MRATE: 31.4 UG/D
CORTIS F/CREAT 24H UR: 21.36 UG/G CRT
CREAT 24H UR-MRATE: 1469 MG/D
CREAT UR-MCNC: 125 MG/DL

## 2023-09-05 PROBLEM — M81.0 AGE-RELATED OSTEOPOROSIS WITHOUT CURRENT PATHOLOGICAL FRACTURE: Status: ACTIVE | Noted: 2023-09-05

## 2023-09-05 PROBLEM — M48.54XD: Status: ACTIVE | Noted: 2021-09-16

## 2023-09-05 PROBLEM — M80.00XD AGE-RELATED OSTEOPOROSIS WITH CURRENT PATHOLOGICAL FRACTURE WITH ROUTINE HEALING, SUBSEQUENT ENCOUNTER: Status: ACTIVE | Noted: 2023-09-05

## 2023-09-05 RX ORDER — DIPHENHYDRAMINE HYDROCHLORIDE 50 MG/ML
50 INJECTION INTRAMUSCULAR; INTRAVENOUS
Status: CANCELLED
Start: 2023-10-04

## 2023-09-05 RX ORDER — EPINEPHRINE 1 MG/ML
0.3 INJECTION, SOLUTION INTRAMUSCULAR; SUBCUTANEOUS EVERY 5 MIN PRN
Status: CANCELLED | OUTPATIENT
Start: 2023-10-04

## 2023-09-05 RX ORDER — ALBUTEROL SULFATE 0.83 MG/ML
2.5 SOLUTION RESPIRATORY (INHALATION)
Status: CANCELLED | OUTPATIENT
Start: 2023-10-04

## 2023-09-05 RX ORDER — METHYLPREDNISOLONE SODIUM SUCCINATE 125 MG/2ML
125 INJECTION, POWDER, LYOPHILIZED, FOR SOLUTION INTRAMUSCULAR; INTRAVENOUS
Status: CANCELLED
Start: 2023-10-04

## 2023-09-05 RX ORDER — ALBUTEROL SULFATE 90 UG/1
1-2 AEROSOL, METERED RESPIRATORY (INHALATION)
Status: CANCELLED
Start: 2023-10-04

## 2023-09-05 NOTE — RESULT ENCOUNTER NOTE
Hello -    The urine test results for calcium and cortisol are within expected range.  The kidney function test results were lower than what it was a year ago.  This could be due to dehydration at the time of the test.  Please follow-up with your primary care physician regarding kidney function.  Work-up for osteoporosis including for celiac disease, thyroid disease, parathyroid hormone and other related labs were within the expected range.    I recommend proceeding with osteoporosis treatment as we have discussed at time of your follow-up.  We discussed about Prolia or denosumab injection at the time of your follow-up.  We will place an order for Prolia denosumab injection so that it can be coordinated [process insurance coverage] with your upcoming visit in 4 weeks.  We will discuss further regarding this treatment at the time of your follow-up appointment.     Please let us know if you have any questions or concerns.     Regards,  Stewart Gagnon MD

## 2023-09-05 NOTE — PROGRESS NOTES
"SUBJECTIVE:   Rosy is a 67 year old who presents for Preventive Visit.    Patient has been advised of split billing requirements and indicates understanding: Yes  Are you in the first 12 months of your Medicare coverage?  No    Healthy Habits:     In general, how would you rate your overall health?  Fair    Frequency of exercise:  2-3 days/week    Duration of exercise:  Less than 15 minutes    Do you usually eat at least 4 servings of fruit and vegetables a day, include whole grains    & fiber and avoid regularly eating high fat or \"junk\" foods?  No    Taking medications regularly:  Yes    Ability to successfully perform activities of daily living:  No assistance needed    Home Safety:  No safety concerns identified    Hearing Impairment:  Difficulty following a conversation in a noisy restaurant or crowded room, need to ask people to speak up or repeat themselves and difficulty understanding soft or whispered speech    In the past 6 months, have you been bothered by leaking of urine?  No    In general, how would you rate your overall mental or emotional health?  Fair      PHQ-2 Total Score: 2    Walking at [place] - tolerated well.        Have you ever done Advance Care Planning? (For example, a Health Directive, POLST, or a discussion with a medical provider or your loved ones about your wishes): Yes, patient states has an Advance Care Planning document and will bring a copy to the clinic.       Fall risk  Fallen 2 or more times in the past year?: No  Any fall with injury in the past year?: No    Cognitive Screening   1) Repeat 3 items (Leader, Season, Table)    2) Clock draw: NORMAL  3) 3 item recall: Recalls 2 objects   Results: NORMAL clock, 1-2 items recalled: COGNITIVE IMPAIRMENT LESS LIKELY    Mini-CogTM Copyright GARRISON Bishop. Licensed by the author for use in [facility] [employer]; reprinted with permission (osbaldo@.[facility]). All rights reserved.      Do you have sleep apnea, excessive snoring or daytime " Vaccine Information Statement(s) or the Emergency Use Authorization was given today. This has been reviewed, questions answered, and verbal consent given by Patient for injection(s) and administration of Pneumococcal Polysaccharide (PPSV) VIS 10/30/19 and Tetanus/Diphtheria/Pertussis (Tdap) VIS 8/6/21.  See immunization grid for documentation.  Samm Newell received TDaP vaccine 0.5 mL injection and was administered in left front deltoid without complications.    Samm Newell received Pneumococcal 23 vaccine 0.5 mL injection and was administered in left rear deltoid without complications.    Shanita Baugh, CMA         drowsiness?: no    Reviewed and updated as needed this visit by clinical staff   Tobacco  Allergies  Meds   Med Hx  Surg Hx  Fam Hx          Reviewed and updated as needed this visit by Provider   Tobacco  Allergies  Meds   Med Hx  Surg Hx  Fam Hx         Social History     Tobacco Use     Smoking status: Former     Smokeless tobacco: Never     Tobacco comments:     1/2 cigarette once in a while for headaches   Substance Use Topics     Alcohol use: Yes     Comment: very rare     If you drink alcohol do you typically have >3 drinks per day or >7 drinks per week? No    Alcohol Use 1/30/2023   Prescreen: >3 drinks/day or >7 drinks/week? -   Prescreen: >3 drinks/day or >7 drinks/week? No                Bipolar 1 disorder, mixed, severe (H)  -ongoing regular care with psychiatry.  She is consistently taking her medication at this time.  Most recent hospitalization was in May 2021.    Bilateral hearing loss, unspecified hearing loss type -decreased hearing noted bilaterally.  Right worse than left.  Does cause some difficulty with conversation.  Is interested in hearing evaluation.  Compression fracture of thoracic spine, non-traumatic, initial encounter (H)/Age-related osteoporosis with current pathological fracture with routine healing, subsequent encounter  -continues to have some mild pain in the back and has lost close to 4 inches in her height.  I did give her a prescription for Fosamax in December 2021 after her compression fractures but she never filled that.  Reports she has some dental work coming up with implants that she has delayed beginning the medication for.    Current providers sharing in care for this patient include:   Patient Care Team:  Yina Ham MD as PCP - General (Family Medicine)  Yina Ham MD as Assigned PCP  Willian Cooley MD as Assigned Neuroscience Provider    The following health maintenance items are reviewed in Epic and correct as of today:  Health  Maintenance   Topic Date Due     LUNG CANCER SCREENING  Never done     COLORECTAL CANCER SCREENING  09/18/2018     ZOSTER IMMUNIZATION (2 of 2) 12/19/2019     MEDICARE ANNUAL WELLNESS VISIT  06/05/2022     Pneumococcal Vaccine: 65+ Years (1 - PCV) Never done     ANNUAL REVIEW OF HM ORDERS  11/16/2022     FALL RISK ASSESSMENT  01/30/2024     MAMMO SCREENING  12/13/2024     DTAP/TDAP/TD IMMUNIZATION (4 - Td or Tdap) 06/13/2027     LIPID  09/26/2027     ADVANCE CARE PLANNING  01/30/2028     DEXA  10/05/2036     HEPATITIS C SCREENING  Completed     HIV SCREENING  Completed     MIGRAINE ACTION PLAN  Completed     INFLUENZA VACCINE  Completed     COVID-19 Vaccine  Completed     IPV IMMUNIZATION  Aged Out     MENINGITIS IMMUNIZATION  Aged Out     PAP  Discontinued     BP Readings from Last 3 Encounters:   01/30/23 127/85   11/16/21 128/87   10/08/21 120/82    Wt Readings from Last 3 Encounters:   01/30/23 90.9 kg (200 lb 8 oz)   11/18/21 90.7 kg (200 lb)   11/16/21 91.6 kg (202 lb)                  Pneumonia Vaccine: Agreeable to immunization today  Mammogram Screening: Mammogram Screening: Recommended mammography every 1-2 years with patient discussion and risk factor consideration    FHS-7:   Breast CA Risk Assessment (FHS-7) 11/16/2021 12/13/2022 1/29/2023   Did any of your first-degree relatives have breast or ovarian cancer? Yes Yes Yes   Did any of your relatives have bilateral breast cancer? No No No   Did any man in your family have breast cancer? No No No   Did any woman in your family have breast and ovarian cancer? Yes No No   Did any woman in your family have breast cancer before age 50 y? No No No   Do you have 2 or more relatives with breast and/or ovarian cancer? No No Yes   Do you have 2 or more relatives with breast and/or bowel cancer? No No Yes       Mammogram Screening: Recommended mammography every 1-2 years with patient discussion and risk factor consideration  Pertinent mammograms are reviewed under  "the imaging tab.    Review of Systems   Constitutional: Negative for chills and fever.   HENT: Positive for hearing loss. Negative for congestion, ear pain and sore throat.    Eyes: Positive for visual disturbance. Negative for pain.   Respiratory: Negative for cough and shortness of breath.    Cardiovascular: Negative for chest pain, palpitations and peripheral edema.   Gastrointestinal: Negative for abdominal pain, constipation, diarrhea, heartburn, hematochezia and nausea.   Breasts:  Negative for tenderness, breast mass and discharge.   Genitourinary: Negative for dysuria, frequency, genital sores, hematuria, pelvic pain, urgency, vaginal bleeding and vaginal discharge.   Musculoskeletal: Negative for arthralgias, joint swelling and myalgias.   Skin: Negative for rash.   Neurological: Negative for dizziness, weakness, headaches and paresthesias.   Psychiatric/Behavioral: Negative for mood changes. The patient is nervous/anxious.      Vision - up to date on exam - no signs of glaucoma.      OBJECTIVE:   /85   Pulse 84   Temp 97.9  F (36.6  C) (Oral)   Resp 24   Ht 1.61 m (5' 3.39\")   Wt 90.9 kg (200 lb 8 oz)   LMP 12/07/2007   SpO2 99%   BMI 35.09 kg/m   Estimated body mass index is 35.09 kg/m  as calculated from the following:    Height as of this encounter: 1.61 m (5' 3.39\").    Weight as of this encounter: 90.9 kg (200 lb 8 oz).  Physical Exam  GENERAL APPEARANCE: alert, no distress and obese  EYES: Eyes grossly normal to inspection, PERRL and conjunctivae and sclerae normal  HENT: ear canals and TM's normal, nose and mouth without ulcers or lesions, oropharynx clear and oral mucous membranes moist  NECK: no adenopathy, no asymmetry, masses, or scars and thyroid normal to palpation  RESP: lungs clear to auscultation - no rales, rhonchi or wheezes  BREAST: normal without masses, tenderness or nipple discharge and no palpable axillary masses or adenopathy  CV: regular rate and rhythm, normal S1 S2, " no S3 or S4, no murmur, click or rub, no peripheral edema and peripheral pulses strong  ABDOMEN: soft, nontender, no hepatosplenomegaly, no masses and bowel sounds normal   (female): normal female external genitalia, normal urethral meatus, vaginal mucosal atrophy noted, normal cervix, adnexae, and uterus without masses or abnormal discharge  MS: no musculoskeletal defects are noted, gait is age appropriate without ataxia, spine/back exam reveals kyphosis and with mild paravertebral thoracic muscle tenderness.  SKIN: no suspicious lesions or rashes  NEURO: Normal strength and tone, sensory exam grossly normal, mentation intact and speech normal  PSYCH: mentation appears normal and affect normal/bright    Diagnostic Test Results:  Labs reviewed in Epic    ASSESSMENT / PLAN:   (Z00.01) Encounter for general adult medical examination with abnormal findings  (primary encounter diagnosis)  Comment: Reviewed labs with her from September.  Plan: Screening and preventive care discussed  The 10-year ASCVD risk score (Michelle VYAS, et al., 2019) is: 5.6%    Values used to calculate the score:      Age: 67 years      Sex: Female      Is Non- : No      Diabetic: No      Tobacco smoker: No      Systolic Blood Pressure: 127 mmHg      Is BP treated: No      HDL Cholesterol: 63 mg/dL      Total Cholesterol: 232 mg/dL      (F31.63) Bipolar 1 disorder, mixed, severe (H)  Comment: ongoing care with psych  Plan: OLANZapine (ZYPREXA) 5 MG tablet        Updated medication list.    (H91.93) Bilateral hearing loss, unspecified hearing loss type  Comment: screening planned.   Plan: Adult Audiology  Referral            (M48.54XD) Non-traumatic compression fracture of T9 thoracic vertebra with routine healing, subsequent (M80.00XD) Age-related osteoporosis with current pathological fracture with routine healing, subsequent encounter  Comment: treatment for osteoporosis recommended.  High risk of recurrent  compression fractures.   Plan: Adult Endocrinology  Referral        Patient with upcoming dental work over the next 6-12 months - implants, etc.  Avoiding bisphosphonate treatment for that reason.  Referral to endocrinology for recommendations.      (Z12.11) Screen for colon cancer  Comment: declines colonoscopy  Plan: Fecal colorectal cancer screen FIT - Future         (S+30)        Will return stool sample for testing.       Patient has been advised of split billing requirements and indicates understanding: Yes      COUNSELING:  Reviewed preventive health counseling, as reflected in patient instructions       Regular exercise       Healthy diet/nutrition       Vision screening       Hearing screening       Dental care       Bladder control       Fall risk prevention       Immunizations    Declined: Zoster due to Other thinks she had second in series.  Will check at [facility].                Osteoporosis prevention/bone health       Colon cancer screening        She reports that she has quit smoking. She has never used smokeless tobacco.      Appropriate preventive services were discussed with this patient, including applicable screening as appropriate for cardiovascular disease, diabetes, osteopenia/osteoporosis, and glaucoma.  As appropriate for age/gender, discussed screening for colorectal cancer, prostate cancer, breast cancer, and cervical cancer. Checklist reviewing preventive services available has been given to the patient.    Reviewed patients plan of care and provided an AVS. The Basic Care Plan (routine screening as documented in Health Maintenance) for Rosy meets the Care Plan requirement. This Care Plan has been established and reviewed with the Patient.          Yina Ham MD  [facility]    Identified Health Risks:  Answers for HPI/ROS submitted by the patient on 1/30/2023  If you checked off any problems, how difficult have these problems made it for you to do your  work, take care of things at home, or get along with other people?: Somewhat difficult  PHQ9 TOTAL SCORE: 9        The patient was provided with suggestions to help her develop a healthy physical lifestyle.  The patient was counseled and encouraged to consider modifying their diet and eating habits. She was provided with information on recommended healthy diet options.  The patient was provided with written information regarding signs of hearing loss.  The patient was provided with suggestions to help her develop a healthy emotional lifestyle.  The patient's PHQ-9 score is consistent with mild depression. She was provided with information regarding depression and was advised to schedule a follow up appointment in 1 weeks with her mental health team to further address this issue.          This chart was documented by provider using a voice activated software called Dragon in addition to manual typing. There may be vocabulary errors or other grammatical errors due to this.         Patient Instructions     Referral for hearing evaluation given.    For the bone density/compression fractures - since you have upcoming dental work that prevents use of the fosamax, I would recommend evaluation with endocrinology to see if there are other recommended treatment for your bones.      Collect and return the sample for colon cancer screening.    [facility] at [facility] to see if they have record of the second shingles vaccine and let me know.         Patient Education   Personalized Prevention Plan  You are due for the preventive services outlined below.  Your care team is available to assist you in scheduling these services.  If you have already completed any of these items, please share that information with your care team to update in your medical record.  Health Maintenance Due   Topic Date Due     LUNG CANCER SCREENING  Never done     Colorectal Cancer Screening  09/18/2018     Zoster (Shingles) Vaccine (2 of 2) 12/19/2019     Annual  Wellness Visit  06/05/2022     Pneumococcal Vaccine (1 - PCV) Never done

## 2023-09-15 ENCOUNTER — TELEPHONE (OUTPATIENT)
Dept: FAMILY MEDICINE | Facility: CLINIC | Age: 68
End: 2023-09-15
Payer: COMMERCIAL

## 2023-09-15 DIAGNOSIS — G89.29 CHRONIC MIDLINE THORACIC BACK PAIN: Primary | ICD-10-CM

## 2023-09-15 DIAGNOSIS — M54.6 CHRONIC MIDLINE THORACIC BACK PAIN: Primary | ICD-10-CM

## 2023-09-15 DIAGNOSIS — M25.562 LEFT KNEE PAIN, UNSPECIFIED CHRONICITY: ICD-10-CM

## 2023-09-15 NOTE — TELEPHONE ENCOUNTER
Order/Referral Request    Who is requesting: pt    Orders being requested: Physical Therapy     Reason service is needed/diagnosis: back and left knee     When are orders needed by: asap     Has this been discussed with Provider: Yes    Does patient have a preference on a Group/Provider/Facility? [facility] at [facility]     Does patient have an appointment scheduled?: No    Where to send orders:  please call and also place in EPIC and [employer]     Could we send this information to you in [employer] or would you prefer to receive a phone call?:   Patient would prefer a phone call   Okay to leave a detailed message?: Yes at Cell number on file:    Telephone Information:   Mobile [phone]

## 2023-09-27 ENCOUNTER — ANCILLARY PROCEDURE (OUTPATIENT)
Dept: BONE DENSITY | Facility: CLINIC | Age: 68
End: 2023-09-27
Attending: INTERNAL MEDICINE
Payer: COMMERCIAL

## 2023-09-27 DIAGNOSIS — M48.54XD NON-TRAUMATIC COMPRESSION FRACTURE OF T9 THORACIC VERTEBRA WITH ROUTINE HEALING, SUBSEQUENT ENCOUNTER: ICD-10-CM

## 2023-09-27 DIAGNOSIS — M80.00XD AGE-RELATED OSTEOPOROSIS WITH CURRENT PATHOLOGICAL FRACTURE WITH ROUTINE HEALING, SUBSEQUENT ENCOUNTER: ICD-10-CM

## 2023-09-27 PROCEDURE — 77080 DXA BONE DENSITY AXIAL: CPT | Performed by: RADIOLOGY

## 2023-09-27 PROCEDURE — 99207 DX WRIST/HEEL/RADIUS: CPT | Performed by: RADIOLOGY

## 2023-10-03 ENCOUNTER — VIRTUAL VISIT (OUTPATIENT)
Dept: ENDOCRINOLOGY | Facility: CLINIC | Age: 68
End: 2023-10-03
Payer: COMMERCIAL

## 2023-10-03 DIAGNOSIS — M81.0 AGE-RELATED OSTEOPOROSIS WITHOUT CURRENT PATHOLOGICAL FRACTURE: Primary | ICD-10-CM

## 2023-10-03 PROCEDURE — 99215 OFFICE O/P EST HI 40 MIN: CPT | Mod: VID | Performed by: INTERNAL MEDICINE

## 2023-10-03 RX ORDER — ALENDRONATE SODIUM 70 MG/1
70 TABLET ORAL
Qty: 12 TABLET | Refills: 3 | Status: SHIPPED | OUTPATIENT
Start: 2023-10-03

## 2023-10-03 NOTE — LETTER
10/3/2023         RE: Rosy Blood  [address]  [facility] [address]        Dear Colleague,    Thank you for referring your patient, Rosy Blood, to the [facility]. Please see a copy of my visit note below.    Endocrinology Clinic Visit    Chief Complaint: RECHECK     Information obtained from:Patient and spouse Dr. BLOOD.       Assessment/Treatment Plan:    Osteoporosis  compression deformities at T7 to T9   Scoliosis      I have personally reviewed bone density from 9/27/23 and agree with the interpretation of lumbar spine T score -3.0, radius 33% T score -3 and left and right femoral neck T-scores -2 and -1.8 respectively.  Mean total hip T score -1.7.  Strong family history of osteoporosis seems to be the risk factor in addition to age.  In order to assess other secondary causes of osteoporosis, a parathyroid hormone, thyroid, celiac disease, 24-hour urine collection for cortisol and calcium and other work-up for secondary causes checked which were unremarkable.  We discussed risk-benefit of injectable bone specific therapies Prolia, Evenity or Reclast however due to ongoing dental  procedures including upcoming implant; Mrs and Dr. Blood are more comfortable to proceed with Fosamax at this time.  Risk of Fosamax including but not limited to esophagitis discussed.  Please check with dentist next week prior to starting therapy.    Plan,  Weight bearing Exercises  Fall prevention  Adequate Calcium and vitamin D intake: for maintenance calcium 1200 mg daily from all sources and vitamin D.   After discussing options of management; I understand that you feel comfortable in proceeding with Fosamax treatment for now.  Prescription sent to pharmacy.    alendronate (FOSAMAX) 70 MG tablet Take 1 tablet (70 mg) by mouth every 7 days Take 60 minutes before am meal with 8 oz. water. Remain upright for 30 minutes. 12   Please check fasting blood work at 8 AM in the  morning without calcium tablet on the day of blood work prior to your follow-up in 6 months.  Orders Placed This Encounter   Procedures     C-Telopeptide, Beta-Cross-Linked      Stewart Gagnon MD  Staff Endocrinologist    Division of Endocrinology and Diabetes      Subjective:         HPI: Rosy Jean is a 68 year old female with history of osteoporosis who is seen in consultation at Yina Ham's request for for the same.     Fracture risk and osteoporosis  Previous fracture after the age of 50 - Yes, thoracic spine   Age >65 - Yes   Loss of height - 3 inches/'hunched back'   Low body mass index; weight less than 127 pounds; no   Family/Parental history of hip fracture/Osteoporosis: Mom and aunts, arm/leg # in Mom   Smoking - no   Early menopause - 45-55  Previous fragility fracture, morphometric vertebral fracture: yes  Current glucocorticoid treatment: >5 mg prednisone for 3 mo or longer; current or previously - no   Excessive Alcohol intake; no   Falls/risk factors - no     Spine image/ if height loss: Yes    Secondary causes of osteoporosis: no RA, no DM, no Prolonged immobility, no Organ transplantation   Calcium and vitamin D intake  Exercise   Lithium, Zyprexa, tegretol   Dental procedure planned-dental implant in the near future    Answers submitted by the patient for this visit:  Symptoms you have experienced in the last 30 days (Submitted on 10/2/2023)  General Symptoms: No  Skin Symptoms: No  HENT Symptoms: No  EYE SYMPTOMS: No  HEART SYMPTOMS: No  LUNG SYMPTOMS: No  INTESTINAL SYMPTOMS: No  URINARY SYMPTOMS: No  GYNECOLOGIC SYMPTOMS: No  BREAST SYMPTOMS: No  SKELETAL SYMPTOMS: No  BLOOD SYMPTOMS: No  NERVOUS SYSTEM SYMPTOMS: No  MENTAL HEALTH SYMPTOMS: No    Allergies   Allergen Reactions     Shellfish Allergy Anaphylaxis       Current Outpatient Medications   Medication Sig Dispense Refill     ARIPiprazole (ABILIFY) 15 MG tablet Take 1 tablet (15 mg) by mouth daily       calcium  citrate-vitamin D (CITRACAL) 200-6.25 MG-MCG TABS per tablet Take 2 tablets by mouth daily       lamoTRIgine (LAMICTAL) 200 MG tablet Take 1 tablet (200 mg) by mouth every morning 30 tablet 0     melatonin 3 MG tablet Take 2 tablets (6 mg) by mouth At Bedtime (Patient taking differently: Take 6 mg by mouth nightly as needed for sleep) 60 tablet 1     metFORMIN (GLUCOPHAGE XR) 500 MG 24 hr tablet Take 1,000 mg by mouth Once daily in evening       Multiple Vitamin (DAILY MULTIVITAMIN PO) Take 1 tablet by mouth daily.       OLANZapine (ZYPREXA) 5 MG tablet Take 0.5 tablets (2.5 mg) by mouth At Bedtime 30 tablet 0     Omega-3 Fatty Acids (FISH OIL PO) Take 1 capsule by mouth daily.         Review of Systems     8 point review system (Constitutional, HENT, Eyes, Respiratory, Cardiovascular, Gastrointestinal, Genitourinary, Musculoskeletal,Neurological, Psychiatric/Behavioural, Endocrine) is negative or is as per HPI above.  Past medical history, past surgical history, family and social history pertinent to the visit reviewed in detail.    Objective:   LMP 12/07/2007   Constitutional: Pleasant no acute cardiopulmonary distress.   EYES: anicteric, normal extra-ocular movements, no lid lag or retraction, is equal and reactive to light bilaterally.  HEENT: Mouth/Throat: Mucous membrane is moist. Oropharynx is clear.    Cardiovascular: RRR, S1, S2 normal.   Pulmonary/Chest: CTAB. No wheezing or rales.   Abdominal: +BS. Non tender to palpation.  Stretch marks: No significant.  Neurological: Alert and oriented.  No tremor and reflexes are symmetrical bilaterally and within the normal limits. Muscle strength 5/5.   Extremities: No edema.  Spine:  significant scoliosis.  No point tenderness.  Psychological: appropriate mood and affect    In House Labs:   Lab Results   Component Value Date    A1C 5.2 09/26/2022    A1C 5.4 02/09/2021    A1C 5.6 06/13/2017     Component      Latest Ref Rng 8/22/2023  3:36 PM 8/25/2023  2:41 PM    Cortisol Free Urine Random      ug/L  26.70    Cortisol ug/g creatinine      ug/g CRT  21.36    Cortisol Free Urine      <=45.0 ug/d  31.4    Creatinine Urine/Volume      mg/dL  125    Creatinine Urine/24hr      500 - 1400 mg/d  1469 (H)    Cortisol Free Urine Intrepretation  See Note    Creatinine Urine      mg/dL  120.0    Duration in hours      h  24.0    Duration in hours      h  24.0    Volume in mL      mL  1,175    Volume in mL      mL  1,175    Creatinine Urine Timed      0.72 - 1.51 g/spec  1.41    Calcium Urine mg/dL      mg/dL  21.0    Calcium Urine g/24 h      0.10 - 0.30 g/spec  0.25    Creatinine      0.51 - 0.95 mg/dL 1.01 (H)     GFR Estimate      >60 mL/min/1.73m2 61     Tissue Transglutaminase Antibody IgA      <7.0 U/mL 0.5     Tissue Transglutaminase Marifer IgG      <7.0 U/mL <0.6     Vitamin D Deficiency screening      20 - 75 ug/L 31     Albumin      3.5 - 5.2 g/dL 4.5     Phosphorus      2.5 - 4.5 mg/dL 3.3     Parathyroid Hormone Intact      15 - 65 pg/mL 57     Calcium      8.8 - 10.2 mg/dL 9.7     Urea Nitrogen      8.0 - 23.0 mg/dL 14.5     TSH      0.30 - 4.20 uIU/mL 0.87      h  TSH   Date Value Ref Range Status   08/22/2023 0.87 0.30 - 4.20 uIU/mL Final   09/26/2022 1.92 0.40 - 4.00 mU/L Final   01/04/2021 1.70 0.40 - 4.00 mU/L Final   10/02/2020 1.63 0.40 - 4.00 mU/L Final   03/24/2014 1.49 0.4 - 5.0 mU/L Final   07/14/2008 1.72 0.4 - 5.0 mU/L Final   07/11/2008 1.32 0.4 - 5.0 mU/L Final       Creatinine   Date Value Ref Range Status   08/22/2023 1.01 (H) 0.51 - 0.95 mg/dL Final   02/09/2021 0.66 0.52 - 1.04 mg/dL Final   ]    This note has been dictated using voice recognition software.  As a result, there may be errors in the documentation that have gone undetected.  Please consider this when interpreting information in this documentation.       Video-Visit Details    Type of service:  Video Visit  Joined the call at 10/3/2023, 1:06:09 pm.  Left the call at 10/3/2023, 1:32:03 pm.  You  were on the call for 25 minutes 54 seconds .  Distant Location (provider location):  Off-site.     Platform used for Video Visit: Key Ring  43 minutes spent by me on the date of the encounter doing chart review, history, more than 75% of the visit was spent directly with the patient counseling on management of osteoporosis and the risk And benefit of osteoporosis medications, documentation and further activities per the note.       Again, thank you for allowing me to participate in the care of your patient.        Sincerely,        Stewart Gagnon MD

## 2023-10-03 NOTE — PROGRESS NOTES
Endocrinology Clinic Visit    Chief Complaint: RECHECK     Information obtained from:Patient and spouse Dr. BLOOD.       Assessment/Treatment Plan:    Osteoporosis  compression deformities at T7 to T9   Scoliosis      I have personally reviewed bone density from 9/27/23 and agree with the interpretation of lumbar spine T score -3.0, radius 33% T score -3 and left and right femoral neck T-scores -2 and -1.8 respectively.  Mean total hip T score -1.7.  Strong family history of osteoporosis seems to be the risk factor in addition to age.  In order to assess other secondary causes of osteoporosis, a parathyroid hormone, thyroid, celiac disease, 24-hour urine collection for cortisol and calcium and other work-up for secondary causes checked which were unremarkable.  We discussed risk-benefit of injectable bone specific therapies Prolia, Evenity or Reclast however due to ongoing dental  procedures including upcoming implant; Mrs and Dr. Blood are more comfortable to proceed with Fosamax at this time.  Risk of Fosamax including but not limited to esophagitis discussed.  Please check with dentist next week prior to starting therapy.    Plan,  Weight bearing Exercises  Fall prevention  Adequate Calcium and vitamin D intake: for maintenance calcium 1200 mg daily from all sources and vitamin D.   After discussing options of management; I understand that you feel comfortable in proceeding with Fosamax treatment for now.  Prescription sent to pharmacy.    alendronate (FOSAMAX) 70 MG tablet Take 1 tablet (70 mg) by mouth every 7 days Take 60 minutes before am meal with 8 oz. water. Remain upright for 30 minutes. 12   Please check fasting blood work at 8 AM in the morning without calcium tablet on the day of blood work prior to your follow-up in 6 months.  Orders Placed This Encounter   Procedures    C-Telopeptide, Beta-Cross-Linked      Stewart Gagnon MD  Staff Endocrinologist    Division of Endocrinology and  Diabetes      Subjective:         HPI: Rosy Jean is a 68 year old female with history of osteoporosis who is seen in consultation at Yina Ham's request for for the same.     Fracture risk and osteoporosis  Previous fracture after the age of 50 - Yes, thoracic spine   Age >65 - Yes   Loss of height - 3 inches/'hunched back'   Low body mass index; weight less than 127 pounds; no   Family/Parental history of hip fracture/Osteoporosis: Mom and aunts, arm/leg # in Mom   Smoking - no   Early menopause - 45-55  Previous fragility fracture, morphometric vertebral fracture: yes  Current glucocorticoid treatment: >5 mg prednisone for 3 mo or longer; current or previously - no   Excessive Alcohol intake; no   Falls/risk factors - no     Spine image/ if height loss: Yes    Secondary causes of osteoporosis: no RA, no DM, no Prolonged immobility, no Organ transplantation   Calcium and vitamin D intake  Exercise   Lithium, Zyprexa, tegretol   Dental procedure planned-dental implant in the near future    Answers submitted by the patient for this visit:  Symptoms you have experienced in the last 30 days (Submitted on 10/2/2023)  General Symptoms: No  Skin Symptoms: No  HENT Symptoms: No  EYE SYMPTOMS: No  HEART SYMPTOMS: No  LUNG SYMPTOMS: No  INTESTINAL SYMPTOMS: No  URINARY SYMPTOMS: No  GYNECOLOGIC SYMPTOMS: No  BREAST SYMPTOMS: No  SKELETAL SYMPTOMS: No  BLOOD SYMPTOMS: No  NERVOUS SYSTEM SYMPTOMS: No  MENTAL HEALTH SYMPTOMS: No    Allergies   Allergen Reactions    Shellfish Allergy Anaphylaxis       Current Outpatient Medications   Medication Sig Dispense Refill    ARIPiprazole (ABILIFY) 15 MG tablet Take 1 tablet (15 mg) by mouth daily      calcium citrate-vitamin D (CITRACAL) 200-6.25 MG-MCG TABS per tablet Take 2 tablets by mouth daily      lamoTRIgine (LAMICTAL) 200 MG tablet Take 1 tablet (200 mg) by mouth every morning 30 tablet 0    melatonin 3 MG tablet Take 2 tablets (6 mg) by mouth At Bedtime (Patient  taking differently: Take 6 mg by mouth nightly as needed for sleep) 60 tablet 1    metFORMIN (GLUCOPHAGE XR) 500 MG 24 hr tablet Take 1,000 mg by mouth Once daily in evening      Multiple Vitamin (DAILY MULTIVITAMIN PO) Take 1 tablet by mouth daily.      OLANZapine (ZYPREXA) 5 MG tablet Take 0.5 tablets (2.5 mg) by mouth At Bedtime 30 tablet 0    Omega-3 Fatty Acids (FISH OIL PO) Take 1 capsule by mouth daily.         Review of Systems     8 point review system (Constitutional, HENT, Eyes, Respiratory, Cardiovascular, Gastrointestinal, Genitourinary, Musculoskeletal,Neurological, Psychiatric/Behavioural, Endocrine) is negative or is as per HPI above.  Past medical history, past surgical history, family and social history pertinent to the visit reviewed in detail.    Objective:   LMP 12/07/2007   Constitutional: Pleasant no acute cardiopulmonary distress.   EYES: anicteric, normal extra-ocular movements, no lid lag or retraction, is equal and reactive to light bilaterally.  HEENT: Mouth/Throat: Mucous membrane is moist. Oropharynx is clear.    Cardiovascular: RRR, S1, S2 normal.   Pulmonary/Chest: CTAB. No wheezing or rales.   Abdominal: +BS. Non tender to palpation.  Stretch marks: No significant.  Neurological: Alert and oriented.  No tremor and reflexes are symmetrical bilaterally and within the normal limits. Muscle strength 5/5.   Extremities: No edema.  Spine:  significant scoliosis.  No point tenderness.  Psychological: appropriate mood and affect    In House Labs:   Lab Results   Component Value Date    A1C 5.2 09/26/2022    A1C 5.4 02/09/2021    A1C 5.6 06/13/2017     Component      Latest Ref Rng 8/22/2023  3:36 PM 8/25/2023  2:41 PM   Cortisol Free Urine Random      ug/L  26.70    Cortisol ug/g creatinine      ug/g CRT  21.36    Cortisol Free Urine      <=45.0 ug/d  31.4    Creatinine Urine/Volume      mg/dL  125    Creatinine Urine/24hr      500 - 1400 mg/d  1469 (H)    Cortisol Free Urine Intrepretation   See Note    Creatinine Urine      mg/dL  120.0    Duration in hours      h  24.0    Duration in hours      h  24.0    Volume in mL      mL  1,175    Volume in mL      mL  1,175    Creatinine Urine Timed      0.72 - 1.51 g/spec  1.41    Calcium Urine mg/dL      mg/dL  21.0    Calcium Urine g/24 h      0.10 - 0.30 g/spec  0.25    Creatinine      0.51 - 0.95 mg/dL 1.01 (H)     GFR Estimate      >60 mL/min/1.73m2 61     Tissue Transglutaminase Antibody IgA      <7.0 U/mL 0.5     Tissue Transglutaminase Marifer IgG      <7.0 U/mL <0.6     Vitamin D Deficiency screening      20 - 75 ug/L 31     Albumin      3.5 - 5.2 g/dL 4.5     Phosphorus      2.5 - 4.5 mg/dL 3.3     Parathyroid Hormone Intact      15 - 65 pg/mL 57     Calcium      8.8 - 10.2 mg/dL 9.7     Urea Nitrogen      8.0 - 23.0 mg/dL 14.5     TSH      0.30 - 4.20 uIU/mL 0.87      h  TSH   Date Value Ref Range Status   08/22/2023 0.87 0.30 - 4.20 uIU/mL Final   09/26/2022 1.92 0.40 - 4.00 mU/L Final   01/04/2021 1.70 0.40 - 4.00 mU/L Final   10/02/2020 1.63 0.40 - 4.00 mU/L Final   03/24/2014 1.49 0.4 - 5.0 mU/L Final   07/14/2008 1.72 0.4 - 5.0 mU/L Final   07/11/2008 1.32 0.4 - 5.0 mU/L Final       Creatinine   Date Value Ref Range Status   08/22/2023 1.01 (H) 0.51 - 0.95 mg/dL Final   02/09/2021 0.66 0.52 - 1.04 mg/dL Final   ]    This note has been dictated using voice recognition software.  As a result, there may be errors in the documentation that have gone undetected.  Please consider this when interpreting information in this documentation.       Video-Visit Details    Type of service:  Video Visit  Joined the call at 10/3/2023, 1:06:09 pm.  Left the call at 10/3/2023, 1:32:03 pm.  You were on the call for 25 minutes 54 seconds .  Distant Location (provider location):  Off-site.     Platform used for Video Visit: [employer]d  43 minutes spent by me on the date of the encounter doing chart review, history, more than 75% of the visit was spent directly with the  patient counseling on management of osteoporosis and the risk And benefit of osteoporosis medications, documentation and further activities per the note.

## 2023-10-03 NOTE — PATIENT INSTRUCTIONS
Weight bearing Exercises  Fall prevention  Adequate Calcium and vitamin D intake: for maintenance calcium 1200 mg daily from all sources and vitamin D.   After discussing options of management; I understand that you feel comfortable in proceeding with Fosamax treatment for now.  Prescription sent to pharmacy.    alendronate (FOSAMAX) 70 MG tablet Take 1 tablet (70 mg) by mouth every 7 days Take 60 minutes before am meal with 8 oz. water. Remain upright for 30 minutes. 12   Please check fasting blood work at 8 AM in the morning without calcium tablet on the day of blood work prior to your follow-up in 6 months.  Orders Placed This Encounter   Procedures    C-Telopeptide, Beta-Cross-Linked

## 2023-10-03 NOTE — NURSING NOTE
Is the patient currently in the state of [place]? YES    Visit mode:VIDEO    If the visit is dropped, the patient can be reconnected by: VIDEO VISIT: Text to cell phone:   Telephone Information:   Mobile [phone]       Will anyone else be joining the visit?   (If patient encounters technical issues they should call [phone]    How would you like to obtain your AVS? MyChart    Are changes needed to the allergy or medication list? Pt stated no med changes or allergy changes    Reason for visit: RECHECK    Nenita HERRERA

## 2023-11-06 NOTE — PROGRESS NOTES
"Group Therapy Progress Notes     Area of Treatment Focus:  Symptom Management, Personal Safety, Community Resources/Discharge Planning, Abstinence/Relapse Prevention, Develop / Improve Independent Living Skills and Develop Socialization / Interpersonal Relationship Skills    Therapeutic Interventions/Treatment Strategies:  Support, Feedback, Safety Assessments, Structured Activity, Problem Solving and Education    Response to Treatment Strategies:  Accepted Feedback, Gave Feedback, Listened, Focused on Goals, Attentive and Accepted Support    Name of Group:  Psychotherapy, Mental Health Management    Progress Note  Psychotherapy: Hour 1: Rosy shared with the group that she feels she is doing very well and believes that her symptoms are stabilized. We discussed the possibility of her being close to finishing the group and she may consider this in a while, but she appreciates the support and structure for right now.  Hour 2: The group took some time to review the group guidelines regarding clear boundaries and the policy of refraining from physical contact. This is in response to some negative feedback regarding being approached \"for a hug\" which was unsolicited and inappropriate.  Mental Health Management: The group discussed the concept of neuroplasticity and how the repetition can alter some neuropathways and reinforce behavior changes.          Is this a Weekly Review of the Progress on the Treatment Plan?  Yes.      Are Treatment Plan Goals being addressed?  Yes, continue treatment goals      Are Treatment Plan Strategies to Address Goals Effective?  Yes, continue treatment strategies      Are there any current contracts in place?  Yes. safety         " At bedside to discuss plan of care with patient and her .     Discussed that the plan for induction includes taking mifepristone orally followed by cytotec to precipitate contractions. Discussed possibility of placing a cervical balloon to facilitate cervical dilation. Discussed pain control options with patient including epidural versus IV pain medication. Patient requesting an epidural prior to receiving cytotec. Will call anesthesia to facilitate.   Discussed patients desire for private burial. Her and her  will reach out to a  home to facilitate. Paperwork signed.   Discussed cytogenetics and autopsy. Patient declining.   Questions answered.     Annette Gramajo, PGY3

## 2023-12-04 ENCOUNTER — THERAPY VISIT (OUTPATIENT)
Dept: PHYSICAL THERAPY | Facility: CLINIC | Age: 68
End: 2023-12-04
Attending: FAMILY MEDICINE
Payer: COMMERCIAL

## 2023-12-04 DIAGNOSIS — M25.562 LEFT KNEE PAIN, UNSPECIFIED CHRONICITY: ICD-10-CM

## 2023-12-04 DIAGNOSIS — M54.6 CHRONIC MIDLINE THORACIC BACK PAIN: ICD-10-CM

## 2023-12-04 DIAGNOSIS — G89.29 CHRONIC MIDLINE THORACIC BACK PAIN: ICD-10-CM

## 2023-12-04 PROCEDURE — 97161 PT EVAL LOW COMPLEX 20 MIN: CPT | Mod: GP | Performed by: PHYSICAL THERAPIST

## 2023-12-04 PROCEDURE — 97112 NEUROMUSCULAR REEDUCATION: CPT | Mod: GP | Performed by: PHYSICAL THERAPIST

## 2023-12-04 PROCEDURE — 97110 THERAPEUTIC EXERCISES: CPT | Mod: GP | Performed by: PHYSICAL THERAPIST

## 2023-12-04 ASSESSMENT — ACTIVITIES OF DAILY LIVING (ADL)
STIFFNESS: THE SYMPTOM AFFECTS MY ACTIVITY SLIGHTLY
WALK: ACTIVITY IS MINIMALLY DIFFICULT
SQUAT: ACTIVITY IS FAIRLY DIFFICULT
AS_A_RESULT_OF_YOUR_KNEE_INJURY,_HOW_WOULD_YOU_RATE_YOUR_CURRENT_LEVEL_OF_DAILY_ACTIVITY?: NEARLY NORMAL
HOW_WOULD_YOU_RATE_THE_CURRENT_FUNCTION_OF_YOUR_KNEE_DURING_YOUR_USUAL_DAILY_ACTIVITIES_ON_A_SCALE_FROM_0_TO_100_WITH_100_BEING_YOUR_LEVEL_OF_KNEE_FUNCTION_PRIOR_TO_YOUR_INJURY_AND_0_BEING_THE_INABILITY_TO_PERFORM_ANY_OF_YOUR_USUAL_DAILY_ACTIVITIES?: 80
HOW_WOULD_YOU_RATE_THE_OVERALL_FUNCTION_OF_YOUR_KNEE_DURING_YOUR_USUAL_DAILY_ACTIVITIES?: NEARLY NORMAL
WEAKNESS: THE SYMPTOM AFFECTS MY ACTIVITY SLIGHTLY
SWELLING: THE SYMPTOM AFFECTS MY ACTIVITY SLIGHTLY
LIMPING: I HAVE THE SYMPTOM BUT IT DOES NOT AFFECT MY ACTIVITY
KNEE_ACTIVITY_OF_DAILY_LIVING_SUM: 41
KNEEL ON THE FRONT OF YOUR KNEE: ACTIVITY IS VERY DIFFICULT
GIVING WAY, BUCKLING OR SHIFTING OF KNEE: THE SYMPTOM AFFECTS MY ACTIVITY SLIGHTLY
GO DOWN STAIRS: ACTIVITY IS SOMEWHAT DIFFICULT
KNEE_ACTIVITY_OF_DAILY_LIVING_SCORE: 58.57
RAW_SCORE: 41
SIT WITH YOUR KNEE BENT: ACTIVITY IS SOMEWHAT DIFFICULT
RISE FROM A CHAIR: ACTIVITY IS FAIRLY DIFFICULT
STAND: ACTIVITY IS SOMEWHAT DIFFICULT
GO UP STAIRS: ACTIVITY IS MINIMALLY DIFFICULT
PAIN: THE SYMPTOM AFFECTS MY ACTIVITY SLIGHTLY

## 2023-12-04 NOTE — PROGRESS NOTES
PHYSICAL THERAPY EVALUATION  Type of Visit: Evaluation    See electronic medical record for Abuse and Falls Screening details.    Subjective       Presenting condition or subjective complaint: Patient reports thoracic back soreness/weakness related to osteoporosis since 2020. She had 4 compression fractures at T7,8,9, 12 in 2020/21. Currently reports no pain however a fatigue/soreness when sitting without a back support. She c/o onset of L knee pain in 8/2023 after doing some walking- knee brace helped reduce the pain currently. She has concerns of poor balance and difficulty squatting and ambulating stairs( needs railing for support) She denies any falls or near falls in past year.  Date of onset: 09/16/23 (MD order for PT)    Relevant medical history: Depression; Hearing problems; Menopause; Mental Illness; Migraines or headaches; Overweight   Dates & types of surgery: B knee in 2005 2009    Prior diagnostic imaging/testing results: MRI; Bone scan     Prior therapy history for the same diagnosis, illness or injury: Yes 2009 knees, back 2022    Prior Level of Function  Transfers: Independent  Ambulation: Independent  ADL: Independent  IADL:     Living Environment  Social support: With a significant other or spouse   Type of home: House   Stairs to enter the home: Yes   Is there a railing: Yes   Ramp: No   Stairs inside the home: Yes   Is there a railing: Yes   Help at home: None  Equipment owned: Straight Cane     Employment: No    Hobbies/Interests:      Patient goals for therapy:      Pain assessment: no pain today     Objective   KNEE EVALUATION  PAIN: Pain Level at Rest: 0/10  Pain Location: anterior medial knee,   Pain Quality: Aching  Pain Frequency: intermittent  Pain is Worst: daytime  Pain is Exacerbated By: knee- squatting stairs,   Pain is Relieved By: unsure  Pain Progression: Improved  INTEGUMENTARY (edema, incisions):   POSTURE: Standing Posture: Rounded shoulders, Forward head, Lordosis increased,  Thoracic kyphosis increased, Pes planus  Sitting Posture: Rounded shoulders, Forward head, Lordosis increased, Thoracic kyphosis increased  GAIT:  Weightbearing Status: WBAT  Assistive Device(s): None  Gait Deviations: Kiah decreased  Knee extension decreased L  BALANCE/PROPRIOCEPTION: Single Leg Stance Eyes Open (seconds): R 7 sec  L 4 sec.  WEIGHTBEARING ALIGNMENT:   NON-WEIGHTBEARING ALIGNMENT:   ROM:  R knee AROM heel slide 0-0-120, L 0- 2- 112       STRENGTH:  L knee EXT 5-/5 +,  L hip ABD 4-/5 R 4+/5, hip FLX R 5-/5 L 4/5  FLEXIBILITY:  tightness B quad , B hamstrings  SPECIAL TESTS:   FUNCTIONAL TESTS:   PALPATION: WNL L knee   JOINT MOBILITY:   CERVICAL SPINE EVALUATION  PAIN: Pain Level at Rest: 0/10  Pain Location: thoracic spine  Pain Quality: Aching  Pain Frequency: intermittent  Pain is Worst: daytime  Pain is Exacerbated By: prolonged sitting without a back on chair  Pain is Relieved By: rest  Pain Progression: Improved  INTEGUMENTARY (edema, incisions):   POSTURE:  see under knee eval  GAIT:   Weightbearing Status:   Assistive Device(s):   Gait Deviations:   BALANCE/PROPRIOCEPTION:   WEIGHTBEARING ALIGNMENT:   ROM:  CROM:   FLX/ EXT min loss, B SB mod loss, B rotation min loss TROM: EXT mod loss, B rotation mod loss    MYOTOMES: WNL  DTR S:   CORD SIGNS:   DERMATOMES:   NEURAL TENSION:   FLEXIBILITY:  tight B UT's levator, pectorals    SPECIAL TESTS:   PALPATION: WNL  SPINAL SEGMENTAL CONCLUSIONS:       Assessment & Plan   CLINICAL IMPRESSIONS  Medical Diagnosis: chronic midline thoracic back pain, L knee pain unspecified chronicity    Treatment Diagnosis: thoracic back pain, osteoporosis, L knee pain   Impression/Assessment: Patient is a 68 year old female with thoracic back pain, L knee pain, poor balance complaints.  The following significant findings have been identified: Decreased ROM/flexibility, Decreased joint mobility, Decreased strength, Impaired balance, Decreased proprioception,  Impaired gait, Impaired muscle performance, Decreased activity tolerance, and Impaired posture. These impairments interfere with their ability to perform self care tasks, household chores, household mobility, and community mobility as compared to previous level of function.     Clinical Decision Making (Complexity):  Clinical Presentation: Stable/Uncomplicated  Clinical Presentation Rationale: based on medical and personal factors listed in PT evaluation  Clinical Decision Making (Complexity): Low complexity    PLAN OF CARE  Treatment Interventions:  Interventions: Neuromuscular Re-education, Therapeutic Activity, Therapeutic Exercise    Long Term Goals     PT Goal 1  Goal Identifier: squatting  Goal Description: Pt will perform a partial squat with 1 hand UE support, equal WB pain free L knee (baseline: partial squat with 2 hand support, increased WB on R LE, excessive trunk flexion, PL 3-4)  Rationale: to maximize safety and independence with performance of ADLs and functional tasks;to maximize safety and independence with self cares;to maximize safety and independence within the home;to maximize safety and independence within the community  Target Date: 03/04/24  PT Goal 2  Goal Identifier: balance  Goal Description: pt will perform SLS B for 15 sec eyes open (baseline: SLS R 7 sec,  L 4 sec)  Rationale: to maximize safety and independence with performance of ADLs and functional tasks;to maximize safety and independence with self cares;to maximize safety and independence within the home;to maximize safety and independence within the community  Target Date: 03/04/24      Frequency of Treatment: 2x month for  Duration of Treatment: 3 months    Recommended Referrals to Other Professionals: Physical Therapy  Education Assessment:   Learner/Method: Patient;Listening;Pictures/Video    Risks and benefits of evaluation/treatment have been explained.   Patient/Family/caregiver agrees with Plan of Care.     Evaluation  Time:     PT Joey, Low Complexity Minutes ([id number]): 17       Signing Clinician: Katharine Patrick PT

## 2023-12-07 ENCOUNTER — TRANSFERRED RECORDS (OUTPATIENT)
Dept: HEALTH INFORMATION MANAGEMENT | Facility: CLINIC | Age: 68
End: 2023-12-07
Payer: COMMERCIAL

## 2023-12-14 ENCOUNTER — TRANSFERRED RECORDS (OUTPATIENT)
Dept: HEALTH INFORMATION MANAGEMENT | Facility: CLINIC | Age: 68
End: 2023-12-14
Payer: COMMERCIAL

## 2023-12-18 ENCOUNTER — THERAPY VISIT (OUTPATIENT)
Dept: PHYSICAL THERAPY | Facility: CLINIC | Age: 68
End: 2023-12-18
Payer: COMMERCIAL

## 2023-12-18 DIAGNOSIS — M54.6 CHRONIC MIDLINE THORACIC BACK PAIN: Primary | ICD-10-CM

## 2023-12-18 DIAGNOSIS — M25.562 LEFT KNEE PAIN, UNSPECIFIED CHRONICITY: ICD-10-CM

## 2023-12-18 DIAGNOSIS — G89.29 CHRONIC MIDLINE THORACIC BACK PAIN: Primary | ICD-10-CM

## 2023-12-18 PROCEDURE — 97110 THERAPEUTIC EXERCISES: CPT | Mod: GP | Performed by: PHYSICAL THERAPIST

## 2023-12-18 PROCEDURE — 97112 NEUROMUSCULAR REEDUCATION: CPT | Mod: GP | Performed by: PHYSICAL THERAPIST

## 2023-12-29 ENCOUNTER — TELEPHONE (OUTPATIENT)
Dept: ENDOCRINOLOGY | Facility: CLINIC | Age: 68
End: 2023-12-29
Payer: COMMERCIAL

## 2023-12-29 NOTE — TELEPHONE ENCOUNTER
Left Voicemail (1st Attempt) for the patient to call back and schedule the following:    Appointment type: Return Endo  Provider: Dr. Gagnon  Return date: 4/3/2024  Specialty phone number: [phone]  Additional appointment(s) needed:   Additonal Notes:     Patient requested a call back sooner to the appointment time(4/3/2024) for a return endo visit with Dr. Gagnon.    m and sent a [email or web address] message to call back and schedule the appointment.    Sia R/Procedure    [facility]   Neurology, NeuroSurgery, NeuroPsychology, Pain Management and Cardiology Specialties  Medical/Surgical Adult Specialties

## 2024-01-11 ENCOUNTER — TRANSFERRED RECORDS (OUTPATIENT)
Dept: HEALTH INFORMATION MANAGEMENT | Facility: CLINIC | Age: 69
End: 2024-01-11
Payer: COMMERCIAL

## 2024-01-22 ENCOUNTER — ANCILLARY PROCEDURE (OUTPATIENT)
Dept: MAMMOGRAPHY | Facility: CLINIC | Age: 69
End: 2024-01-22
Payer: COMMERCIAL

## 2024-01-22 DIAGNOSIS — Z12.31 VISIT FOR SCREENING MAMMOGRAM: ICD-10-CM

## 2024-01-22 PROCEDURE — 77067 SCR MAMMO BI INCL CAD: CPT | Mod: GC | Performed by: RADIOLOGY

## 2024-01-22 PROCEDURE — 77063 BREAST TOMOSYNTHESIS BI: CPT | Mod: GC | Performed by: RADIOLOGY

## 2024-02-07 ENCOUNTER — TELEPHONE (OUTPATIENT)
Dept: FAMILY MEDICINE | Facility: CLINIC | Age: 69
End: 2024-02-07
Payer: COMMERCIAL

## 2024-02-07 NOTE — TELEPHONE ENCOUNTER
Patient Quality Outreach    Patient is due for the following:   Physical Annual Wellness Visit      Topic Date Due    Zoster (Shingles) Vaccine (2 of 2) 12/19/2019    Flu Vaccine (1) 09/01/2023    COVID-19 Vaccine (4 - 2023-24 season) 09/01/2023       Next Steps:   Schedule annual wellness appointment     Type of outreach:    Sent [employer] message.    Next Steps:  Reach out within 90 days via [employer].    Max number of attempts reached: No. Will try again in 90 days if patient still on fail list.    Questions for provider review:    None           Kristi German MA

## 2024-02-08 ENCOUNTER — TRANSFERRED RECORDS (OUTPATIENT)
Dept: HEALTH INFORMATION MANAGEMENT | Facility: CLINIC | Age: 69
End: 2024-02-08
Payer: COMMERCIAL

## 2024-02-08 PROBLEM — M54.6 CHRONIC MIDLINE THORACIC BACK PAIN: Status: RESOLVED | Noted: 2023-12-04 | Resolved: 2024-02-08

## 2024-02-08 PROBLEM — M25.562 LEFT KNEE PAIN, UNSPECIFIED CHRONICITY: Status: RESOLVED | Noted: 2023-12-04 | Resolved: 2024-02-08

## 2024-02-08 PROBLEM — G89.29 CHRONIC MIDLINE THORACIC BACK PAIN: Status: RESOLVED | Noted: 2023-12-04 | Resolved: 2024-02-08

## 2024-04-14 ENCOUNTER — HEALTH MAINTENANCE LETTER (OUTPATIENT)
Age: 69
End: 2024-04-14

## 2024-04-26 ENCOUNTER — LAB (OUTPATIENT)
Dept: LAB | Facility: CLINIC | Age: 69
End: 2024-04-26
Payer: COMMERCIAL

## 2024-04-26 DIAGNOSIS — Z79.899 NEED FOR PROPHYLACTIC CHEMOTHERAPY: ICD-10-CM

## 2024-04-26 DIAGNOSIS — M81.0 AGE-RELATED OSTEOPOROSIS WITHOUT CURRENT PATHOLOGICAL FRACTURE: ICD-10-CM

## 2024-04-26 LAB
ERYTHROCYTE [DISTWIDTH] IN BLOOD BY AUTOMATED COUNT: 13.1 % (ref 10–15)
HBA1C MFR BLD: 5.2 % (ref 0–5.6)
HCT VFR BLD AUTO: 40.2 % (ref 35–47)
HGB BLD-MCNC: 13.4 G/DL (ref 11.7–15.7)
MCH RBC QN AUTO: 32.7 PG (ref 26.5–33)
MCHC RBC AUTO-ENTMCNC: 33.3 G/DL (ref 31.5–36.5)
MCV RBC AUTO: 98 FL (ref 78–100)
PLATELET # BLD AUTO: 227 10E3/UL (ref 150–450)
RBC # BLD AUTO: 4.1 10E6/UL (ref 3.8–5.2)
WBC # BLD AUTO: 4.3 10E3/UL (ref 4–11)

## 2024-04-26 PROCEDURE — 80175 DRUG SCREEN QUAN LAMOTRIGINE: CPT | Mod: 90

## 2024-04-26 PROCEDURE — 36415 COLL VENOUS BLD VENIPUNCTURE: CPT

## 2024-04-26 PROCEDURE — 85027 COMPLETE CBC AUTOMATED: CPT

## 2024-04-26 PROCEDURE — 80061 LIPID PANEL: CPT

## 2024-04-26 PROCEDURE — 83036 HEMOGLOBIN GLYCOSYLATED A1C: CPT

## 2024-04-26 PROCEDURE — 99000 SPECIMEN HANDLING OFFICE-LAB: CPT

## 2024-04-26 PROCEDURE — 80053 COMPREHEN METABOLIC PANEL: CPT

## 2024-04-26 PROCEDURE — 83735 ASSAY OF MAGNESIUM: CPT

## 2024-04-27 LAB
ALBUMIN SERPL BCG-MCNC: 4.4 G/DL (ref 3.5–5.2)
ALP SERPL-CCNC: 84 U/L (ref 40–150)
ALT SERPL W P-5'-P-CCNC: 38 U/L (ref 0–50)
ANION GAP SERPL CALCULATED.3IONS-SCNC: 12 MMOL/L (ref 7–15)
AST SERPL W P-5'-P-CCNC: 23 U/L (ref 0–45)
BILIRUB SERPL-MCNC: 0.3 MG/DL
BUN SERPL-MCNC: 14.8 MG/DL (ref 8–23)
CALCIUM SERPL-MCNC: 9.5 MG/DL (ref 8.8–10.2)
CHLORIDE SERPL-SCNC: 109 MMOL/L (ref 98–107)
CHOLEST SERPL-MCNC: 235 MG/DL
COLLAGEN CTX SERPL-MCNC: 709 PG/ML
CREAT SERPL-MCNC: 0.78 MG/DL (ref 0.51–0.95)
DEPRECATED HCO3 PLAS-SCNC: 21 MMOL/L (ref 22–29)
EGFRCR SERPLBLD CKD-EPI 2021: 83 ML/MIN/1.73M2
FASTING STATUS PATIENT QL REPORTED: YES
FASTING STATUS PATIENT QL REPORTED: YES
GLUCOSE SERPL-MCNC: 96 MG/DL (ref 70–99)
GLUCOSE SERPL-MCNC: 96 MG/DL (ref 70–99)
HDLC SERPL-MCNC: 56 MG/DL
LAMOTRIGINE SERPL-MCNC: 3.8 UG/ML
LDLC SERPL CALC-MCNC: 167 MG/DL
MAGNESIUM SERPL-MCNC: 2.3 MG/DL (ref 1.7–2.3)
NONHDLC SERPL-MCNC: 179 MG/DL
POTASSIUM SERPL-SCNC: 4.2 MMOL/L (ref 3.4–5.3)
PROT SERPL-MCNC: 7.2 G/DL (ref 6.4–8.3)
SODIUM SERPL-SCNC: 142 MMOL/L (ref 135–145)
TRIGL SERPL-MCNC: 61 MG/DL

## 2024-05-06 ENCOUNTER — MYC MEDICAL ADVICE (OUTPATIENT)
Dept: ENDOCRINOLOGY | Facility: CLINIC | Age: 69
End: 2024-05-06
Payer: COMMERCIAL

## 2024-05-06 NOTE — RESULT ENCOUNTER NOTE
Please offer follow-up 5/13/2024 or 5/29/2024, okay to use of hold spots.     The bone turnover marker = C telopeptide level is not within the desired range.  The C telopeptide level suggests effectiveness of osteoporosis medication.  Based on the C telopeptide level, I recommend switching your osteoporosis medication to more stronger bone specific therapies.  Our team will reach out to you to schedule a follow-up appointment to discuss this further.    please let us know if you have any questions or concerns.     Regards,  Stewart Gagnon MD

## 2024-05-13 NOTE — TELEPHONE ENCOUNTER
Left message for [place] lab to call back. Clinic needs to cancel C-Telopeptide and credit this to account as the patient never started the Fosamax.    Gunjan Razo, FIDELIA  Adult Endocrinology  [facility]

## 2024-05-13 NOTE — TELEPHONE ENCOUNTER
Deb From [facility] called to get clarification non why the test needed to be credited, after further review she now understand and will credit the patient for the lab that wasn't suppose to been done DANY thank you.

## 2024-07-01 ENCOUNTER — TELEPHONE (OUTPATIENT)
Dept: FAMILY MEDICINE | Facility: CLINIC | Age: 69
End: 2024-07-01
Payer: COMMERCIAL

## 2024-07-01 NOTE — TELEPHONE ENCOUNTER
Patient Quality Outreach    Patient is due for the following:   Colon Cancer Screening  Physical Annual Wellness Visit      Topic Date Due    Zoster (Shingles) Vaccine (2 of 2) 12/19/2019    COVID-19 Vaccine (4 - 2023-24 season) 09/01/2023       Next Steps:   Schedule annual wellness visit     Type of outreach:    Sent [facility] [employer] message.    Next Steps:  Reach out within 90 days via Letter.    Max number of attempts reached: No. Will try again in 90 days if patient still on fail list.    Questions for provider review:    None           Kristi German MA

## 2024-12-30 ENCOUNTER — TELEPHONE (OUTPATIENT)
Dept: FAMILY MEDICINE | Facility: CLINIC | Age: 69
End: 2024-12-30
Payer: COMMERCIAL

## 2024-12-30 NOTE — TELEPHONE ENCOUNTER
Patient Quality Outreach    Patient is due for the following:   Colon Cancer Screening  Breast Cancer Screening - Mammogram  Physical Annual Wellness Visit      Topic Date Due    Zoster (Shingles) Vaccine (2 of 2) 12/19/2019    Flu Vaccine (1) 09/01/2024    COVID-19 Vaccine (4 - 2024-25 season) 09/01/2024     Fall Risk Assessment     Action(s) Taken:   SCHEDULE ANNUAL WELLNESS APPOINTMENT     Type of outreach:    Sent letter.    Questions for provider review:    None           Kristi German MA

## 2024-12-30 NOTE — LETTER
[facility]  [address] [id number] [phone]  Dept: [phone]    December 30, 2024    Rosy Jean  [address] [facility] [address] [address]    Dear Rosy,    At [facility] we care about your health and are committed to providing quality patient care.     Here is a list of Health Maintenance topics that are due now or due soon:  Health Maintenance Due   Topic Date Due    ZOSTER IMMUNIZATION (2 of 2) 12/19/2019    MEDICARE ANNUAL WELLNESS VISIT  01/30/2024    ANNUAL REVIEW OF HM ORDERS  01/30/2024    FALL RISK ASSESSMENT  01/30/2024    COLORECTAL CANCER SCREENING  02/15/2024    INFLUENZA VACCINE (1) 09/01/2024    COVID-19 Vaccine (4 - 2024-25 season) 09/01/2024        We are recommending that you:  Schedule a WELLNESS (Preventative/Physical) APPOINTMENT with your primary care provider. If you go elsewhere for your wellness appointments then please disregard this reminder    ,   Schedule a MAMMOGRAM which is due.    1 in 8 women will develop invasive breast cancer during her lifetime and it is the most common non-skin cancer in American women.  EARLY detection, new treatments, and a better understanding of the disease have increased survival rates - the 5 year survival rate in the 1960s was 63% and today it is close to 90%.    If you are under/uninsured, we recommend you contact the [facility]. They offer mammograms at no charge or on a sliding fee charge. You can schedule with them at [phone]. Please have them send us the results.      Please disregard this reminder if you have had this exam elsewhere within the last year.  It would be helpful for us to have a copy of your mammogram report in your file so that we can best coordinate your care - please contact us with when your test was done so we can update your record.    ,   Schedule a COLONOSCOPY to assess for colon cancer (due every 10 years or 5 years in higher  Behavioral Health IP Nursing Progress Note    Suicidal Ideation:  Patient reported having racing thoughts and CAH to harm self     Current C-SSRS score: Low Risk - Green (11/14/24 0800)      Protective Factors / Reason for Living: Responsibility to children, Social supports    Interventions:              - Q15 minute safety checks  - Ensure 1:1 with RN,   - Medication education and administration   - encourage lounge and group attendance,   - monitor ADLs/nutrition/side effects,   - provide redirection and space as needed   - provide supportive listening.       Subjective: Patient reported \"I don't know why it keeps happening.\" He reported his thoughts won't stop, made motion of [place] next to his temple, \"They go fast and won't stop. The voice tells me to hurt myself.\" He denied having a plan. He denied HI or having CAH to hurt others. He stated he only hears the voice - does not see it or feel it but can still sense the presence of \"the spirit.\" Stated he can feel it in his heart \"because it beats fast.\" He denied pain. Reported poor sleep due to thoughts all night.     Objective:   Mental Health: Patient laying in bed with eyes closed but easily roused. He had flat affect, appeared depressed and anxious. Minimal eye contact observed. Able to answer questions and stay on topic. Able to make his needs known, was alert and oriented. He ate breakfast. Requested and received PRN Hydroxyzine. About an hour after this medication was received, patient came out of his room and reported he wanted to kill himself. Grabbed RN's pen off the cart and put it to his chest. Pen was retrieved. He then repeatedly asked for a knife or scissors because he wanted to kill himself. He was given PRN Zydis 5mg, was seen by Dr. Aguilar and given  PRN Clonazepam 0.5mg.       Medical:   None this shift He denied health issues as it pertains to physical health, attributes his physical symptoms to the \"bad thoughts.\"      Assessment /  Actions:   PRN Medications given?   Yes. Patient Response: Provided PRN hydroxyzine for c/o anxiety r/t racing thoughts and CAH, ineffective. Was also provided with PRN Zyprexa due to increase agitation, verbalization of and behaviors of attempting self harm; he also received prn Clonazepam which appeared effective in calming patient down. RN and BHT ensured there was no contraband in patient's room that could be used for self harm.  He stated the Zydis doesn't help him and he wants something stronger. He stated he is okay with a medication making him drowsy because he would rather be sleepy than have these thoughts. Provider updated on patient's request.     Plan:   Treatment Plan reviewed.         risk situations.)       Colon cancer is now the second leading cause of cancer-related deaths in the [place] for both men and women and there are over 130,000 new cases and 50,000 deaths per year from colon cancer.  Colonoscopies can prevent 90-95% of these deaths.  Problem lesions can be removed before they ever become cancer.  This test is not only looking for cancer, but also getting rid of precancerious lesions.    If you are under/uninsured, we recommend you contact the [facility] program. [facility] is a free colorectal cancer screening program that provides colonoscopies for eligible under/uninsured [place] men and women. If you are interested in receiving a free colonoscopy, please call [facility] at [phone] (mention code [employer]) to see if you re eligible.     If you do not wish to do a colonoscopy or cannot afford to do one, at this time, there is another option. It is called a FIT test or Fecal Immunochemical Occult Blood Test (take home stool sample kit).  It does not replace the colonoscopy for colorectal cancer screening, but it can detect hidden bleeding in the lower colon.  It does need to be repeated every year and if a positive result is obtained, you would be referred for a colonoscopy.    If you have completed either one of these tests at another facility, please call/respond to this message with the details of when and where the tests were done and if they were normal or not. Or have the records sent to our clinic so that we can best coordinate your care.  ,   Schedule a Nurse-Only appointment to update your immunizations: Your records indicate that you are not up to date with your immunizations, please schedule a nurse-only appointment to get these updated or update them at your next office visit. If this is incorrect, please disregard.    , and ...    To schedule an appointment or discuss this further, you may contact us by phone at the [facility] at [phone] or  online through the patient [email or web address] @ [email or web address]    Thank you for trusting [facility] and we appreciate the opportunity to serve you.  We look forward to supporting your healthcare needs in the future.    Your partners in health,      Quality Committee at [facility]         Electronically signed

## 2025-04-19 ENCOUNTER — HEALTH MAINTENANCE LETTER (OUTPATIENT)
Age: 70
End: 2025-04-19

## 2025-05-22 ENCOUNTER — PATIENT OUTREACH (OUTPATIENT)
Dept: CARE COORDINATION | Facility: CLINIC | Age: 70
End: 2025-05-22
Payer: COMMERCIAL

## 2025-05-31 ENCOUNTER — HEALTH MAINTENANCE LETTER (OUTPATIENT)
Age: 70
End: 2025-05-31

## 2025-07-28 SDOH — HEALTH STABILITY: PHYSICAL HEALTH: ON AVERAGE, HOW MANY MINUTES DO YOU ENGAGE IN EXERCISE AT THIS LEVEL?: 10 MIN

## 2025-07-28 SDOH — HEALTH STABILITY: PHYSICAL HEALTH: ON AVERAGE, HOW MANY DAYS PER WEEK DO YOU ENGAGE IN MODERATE TO STRENUOUS EXERCISE (LIKE A BRISK WALK)?: 1 DAY

## 2025-07-28 ASSESSMENT — SOCIAL DETERMINANTS OF HEALTH (SDOH): HOW OFTEN DO YOU GET TOGETHER WITH FRIENDS OR RELATIVES?: MORE THAN THREE TIMES A WEEK

## 2025-07-31 ENCOUNTER — OFFICE VISIT (OUTPATIENT)
Dept: FAMILY MEDICINE | Facility: CLINIC | Age: 70
End: 2025-07-31
Payer: COMMERCIAL

## 2025-07-31 VITALS
HEIGHT: 63 IN | WEIGHT: 171.06 LBS | HEART RATE: 77 BPM | BODY MASS INDEX: 30.31 KG/M2 | SYSTOLIC BLOOD PRESSURE: 119 MMHG | DIASTOLIC BLOOD PRESSURE: 78 MMHG | TEMPERATURE: 99.1 F | RESPIRATION RATE: 16 BRPM | OXYGEN SATURATION: 97 %

## 2025-07-31 DIAGNOSIS — H91.93 DECREASED HEARING OF BOTH EARS: ICD-10-CM

## 2025-07-31 DIAGNOSIS — Z12.11 SCREEN FOR COLON CANCER: ICD-10-CM

## 2025-07-31 DIAGNOSIS — L65.9 HAIR LOSS: ICD-10-CM

## 2025-07-31 DIAGNOSIS — Z00.00 ROUTINE GENERAL MEDICAL EXAMINATION AT A HEALTH CARE FACILITY: Primary | ICD-10-CM

## 2025-07-31 DIAGNOSIS — M25.561 ACUTE PAIN OF RIGHT KNEE: ICD-10-CM

## 2025-07-31 DIAGNOSIS — Z13.1 SCREENING FOR DIABETES MELLITUS: ICD-10-CM

## 2025-07-31 DIAGNOSIS — Z12.31 ENCOUNTER FOR SCREENING MAMMOGRAM FOR BREAST CANCER: ICD-10-CM

## 2025-07-31 DIAGNOSIS — Z13.220 LIPID SCREENING: ICD-10-CM

## 2025-07-31 DIAGNOSIS — M81.0 AGE-RELATED OSTEOPOROSIS WITHOUT CURRENT PATHOLOGICAL FRACTURE: ICD-10-CM

## 2025-07-31 DIAGNOSIS — F31.63 BIPOLAR 1 DISORDER, MIXED, SEVERE (H): ICD-10-CM

## 2025-07-31 LAB
ERYTHROCYTE [DISTWIDTH] IN BLOOD BY AUTOMATED COUNT: 12.5 % (ref 10–15)
EST. AVERAGE GLUCOSE BLD GHB EST-MCNC: 103 MG/DL
HBA1C MFR BLD: 5.2 % (ref 0–5.6)
HCT VFR BLD AUTO: 40.1 % (ref 35–47)
HGB BLD-MCNC: 13.3 G/DL (ref 11.7–15.7)
MCH RBC QN AUTO: 33.2 PG (ref 26.5–33)
MCHC RBC AUTO-ENTMCNC: 33.2 G/DL (ref 31.5–36.5)
MCV RBC AUTO: 100 FL (ref 78–100)
PLATELET # BLD AUTO: 217 10E3/UL (ref 150–450)
RBC # BLD AUTO: 4.01 10E6/UL (ref 3.8–5.2)
WBC # BLD AUTO: 4.7 10E3/UL (ref 4–11)

## 2025-07-31 RX ORDER — BUPROPION HYDROCHLORIDE 300 MG/1
300 TABLET ORAL EVERY MORNING
COMMUNITY
Start: 2024-10-17

## 2025-07-31 ASSESSMENT — PATIENT HEALTH QUESTIONNAIRE - PHQ9
SUM OF ALL RESPONSES TO PHQ QUESTIONS 1-9: 3
SUM OF ALL RESPONSES TO PHQ QUESTIONS 1-9: 3
10. IF YOU CHECKED OFF ANY PROBLEMS, HOW DIFFICULT HAVE THESE PROBLEMS MADE IT FOR YOU TO DO YOUR WORK, TAKE CARE OF THINGS AT HOME, OR GET ALONG WITH OTHER PEOPLE: NOT DIFFICULT AT ALL

## 2025-07-31 ASSESSMENT — PAIN SCALES - GENERAL: PAINLEVEL_OUTOF10: NO PAIN (0)

## 2025-07-31 NOTE — PATIENT INSTRUCTIONS
"Labs today.   Results to [employer] when available.    Mammogram recommended.  Please call [facility] to set up this appointment     Referral audiology and PHYSICAL THERAPY  - someone will call to set up these appointments.        Collect stool sample for colon cancer screening.             Patient Education   Preventive Care Advice   This is general advice we often give to help people stay healthy. Your care team may have specific advice just for you. Please talk to your care team about your own preventive care needs.  Lifestyle  Exercise at least 150 minutes each week (30 minutes a day, 5 days a week).  Do muscle strengthening activities 2 days a week. These help control your weight and prevent disease.  No smoking.  Wear sunscreen to prevent skin cancer.  Take time with family and friends.  Have your home tested for radon every 2 to 5 years. Radon is a colorless, odorless gas that can harm your lungs. To learn more, go to www.health.Zocere.mn.us and search for \"Radon in Homes.\"  Keep guns unloaded and locked up in a safe place like a safe or gun vault, or, use a gun lock and hide the keys. Always lock away bullets separately. To learn more, visit Playdemic.mn.gov and search for \"safe gun storage.\"  Nutrition  Eat 5 or more servings of fruits and vegetables each day.  Try wheat bread, brown rice and whole grain pasta (instead of white bread, rice, and pasta).  Get enough calcium and vitamin D. Check the label on foods and aim for 100% of the RDA (recommended daily allowance).  Regular exams  Have a dental exam and cleaning every 6 months.  Older adults: Ask your care team how often to have memory testing.  See your health care team every year to talk about:  Any changes in your health.  Any medicines your care team has prescribed.  Preventive care, family planning, and ways to prevent chronic diseases.  Shots (vaccines)   HPV shots (up to age 26), if you've never had them before.  Hepatitis B shots (up to age 59), if " you've never had them before.  COVID-19 shot: Get this shot when it's due.  Flu shot: Get a flu shot every year.  Tetanus shot: Get a tetanus shot every 10 years.  Pneumococcal, hepatitis A, and RSV shots: Ask your care team if you need these based on your risk.  Shingles shot (for age 50 and up).  General health tests  Diabetes screening:  Starting at age 35, Get screened for diabetes at least every 3 years.  If you are younger than age 35, ask your care team if you should be screened for diabetes.  Cholesterol test: At age 39, start having a cholesterol test every 5 years, or more often if advised.  Bone density scan (DEXA): At age 50, ask your care team if you should have this scan for osteoporosis (brittle bones).  Hepatitis C: Get tested at least once in your life.  Abdominal aortic aneurysm screening: Talk to your doctor about having this screening if you:  Have ever smoked; and  Are biologically male; and  Are between the ages of 65 and 75.  STIs (sexually transmitted infections)  Before age 24: Ask your care team if you should be screened for STIs.  After age 24: Get screened for STIs if you're at risk. You are at risk for STIs (including HIV) if:  You are sexually active with more than one person.  You don't use condoms every time.  You or a partner was diagnosed with a sexually transmitted infection.  If you are at risk for HIV, ask about PrEP medicine to prevent HIV.  Get tested for HIV at least once in your life, whether you are at risk for HIV or not.  Cancer screening tests  Cervical cancer screening: If you have a cervix, begin getting regular cervical cancer screening tests at age 21. Most people who have regular screenings with normal results can stop after age 65. Talk about this with your provider.  Breast cancer scan (mammogram): If you've ever had breasts, begin having regular mammograms starting at age 40. This is a scan to check for breast cancer.  Colon cancer screening: It is important to  start screening for colon cancer at age 45.  Have a colonoscopy test every 10 years (or more often if you're at risk) Or, ask your provider about stool tests like a FIT test every year or Cologuard test every 3 years.  To learn more about your testing options, visit: www.Exogenesis/411093.pdf.  For help making a decision, visit: lamar/[email or web address].  Prostate cancer screening test: If you have a prostate and are age 55 to 69, ask your provider if you would benefit from a yearly prostate cancer screening test.  Lung cancer screening: If you are a current or former smoker age 50 to 80, ask your care team if ongoing lung cancer screenings are right for you.    For informational purposes only. Not to replace the advice of your health care provider. Copyright   2023 [facility]. All rights reserved. Clinically reviewed by the [facility]. Fixya [id number] - REV 6/25.  Preventing Falls: Care Instructions  Injuries and health problems such as trouble walking or poor eyesight can increase your risk of falling. So can some medicines. But there are things you can do to help prevent falls. You can exercise to get stronger. You can also arrange your home to make it safer.    Talk to your doctor about the medicines you take. Ask if any of them increase the risk of falls and whether they can be changed or stopped.   Try to exercise regularly. It can help improve your strength and balance. This can help lower your risk of falling.         Practice fall safety and prevention.   Wear low-heeled shoes that fit well and give your feet good support. Talk to your doctor if you have foot problems that make this hard.  Carry a cellphone or wear a medical alert device that you can use to call for help.  Use stepladders instead of chairs to reach high objects. Don't climb if you're at risk for falls. Ask for help, if needed.  Wear the correct eyeglasses, if you need them.        Make your home safer.  "  Remove rugs, cords, clutter, and furniture from walkways.  Keep your house well lit. Use night-lights in hallways and bathrooms.  Install and use sturdy handrails on stairways.  Wear nonskid footwear, even inside. Don't walk barefoot or in socks without shoes.        Be safe outside.   Use handrails, curb cuts, and ramps whenever possible.  Keep your hands free by using a shoulder bag or backpack.  Try to walk in well-lit areas. Watch out for uneven ground, changes in pavement, and debris.  Be careful in the winter. Walk on the grass or gravel when sidewalks are slippery. Use de-icer on steps and walkways. Add non-slip devices to shoes.    Put grab bars and nonskid mats in your shower or tub and near the toilet. Try to use a shower chair or bath bench when bathing.   Get into a tub or shower by putting in your weaker leg first. Get out with your strong side first. Have a phone or medical alert device in the bathroom with you.   Where can you learn more?  Go to https://www.[employer].net/patiented  Enter G117 in the search box to learn more about \"Preventing Falls: Care Instructions.\"  Current as of: July 31, 2024  Content Version: 14.5    0598-5735 [employer].   Care instructions adapted under license by your healthcare professional. If you have questions about a medical condition or this instruction, always ask your healthcare professional. [employer] disclaims any warranty or liability for your use of this information.    Hearing Loss: Care Instructions  Overview     Hearing loss is a sudden or slow decrease in how well you hear. It can range from slight to profound. Permanent hearing loss can occur with aging. It also can happen when you are exposed long-term to loud noise. Examples include listening to loud music, riding motorcycles, or being around other loud machines.  Hearing loss can affect your work and home life. It can make you feel lonely or depressed. You may feel that you have " lost your independence. But hearing aids and other devices can help you hear better and feel connected to others.  Follow-up care is a key part of your treatment and safety. Be sure to make and go to all appointments, and call your doctor if you are having problems. It's also a good idea to know your test results and keep a list of the medicines you take.  How can you care for yourself at home?  Avoid loud noises whenever possible. This helps keep your hearing from getting worse.  Always wear hearing protection around loud noises.  Wear a hearing aid as directed.  A professional can help you pick a hearing aid that will work best for you.  You can also get hearing aids over the counter for mild to moderate hearing loss.  Have hearing tests as your doctor suggests. They can show whether your hearing has changed. Your hearing aid may need to be adjusted.  Use other devices as needed. These may include:  Telephone amplifiers and hearing aids that can connect to a television, stereo, radio, or microphone.  Devices that use lights or vibrations. These alert you to the doorbell, a ringing telephone, or a baby monitor.  Television closed-captioning. This shows the words at the bottom of the screen. Most new TVs can do this.  TTY (text telephone). This lets you type messages back and forth on the telephone instead of talking or listening. These devices are also called TDD. When messages are typed on the keyboard, they are sent over the phone line to a receiving TTY. The message is shown on a monitor.  Use text messaging, social media, and email if it is hard for you to communicate by telephone.  Try to learn a listening technique called speechreading. It is not lipreading. You pay attention to people's gestures, expressions, posture, and tone of voice. These clues can help you understand what a person is saying. Face the person you are talking to, and have them face you. Make sure the lighting is good. You need to see the  "other person's face clearly.  Think about counseling if you need help to adjust to your hearing loss.  When should you call for help?  Watch closely for changes in your health, and be sure to contact your doctor if:    You think your hearing is getting worse.     You have new symptoms, such as dizziness or nausea.   Where can you learn more?  Go to https://www.Exacter.net/patiented  Enter R798 in the search box to learn more about \"Hearing Loss: Care Instructions.\"  Current as of: October 27, 2024  Content Version: 14.5    0437-5292 [employer].   Care instructions adapted under license by your healthcare professional. If you have questions about a medical condition or this instruction, always ask your healthcare professional. [employer] disclaims any warranty or liability for your use of this information.    Learning About Stress  What is stress?     Stress is your body's response to a hard situation. Your body can have a physical, emotional, or mental response. Stress is a fact of life for most people, and it affects everyone differently. What causes stress for you may not be stressful for someone else.  A lot of things can cause stress. You may feel stress when you go on a job interview, take a test, or run a race. This kind of short-term stress is normal and even useful. It can help you if you need to work hard or react quickly. For example, stress can help you finish an important job on time.  Long-term stress is caused by ongoing stressful situations or events. Examples of long-term stress include long-term health problems, ongoing problems at work, or conflicts in your family. Long-term stress can harm your health.  How does stress affect your health?  When you are stressed, your body responds as though you are in danger. It makes hormones that speed up your heart, make you breathe faster, and give you a burst of energy. This is called the fight-or-flight stress response. If the " stress is over quickly, your body goes back to normal and no harm is done.  But if stress happens too often or lasts too long, it can have bad effects. Long-term stress can make you more likely to get sick, and it can make symptoms of some diseases worse. If you tense up when you are stressed, you may develop neck, shoulder, or low back pain. Stress is linked to high blood pressure and heart disease.  Stress also harms your emotional health. It can make you palacio, tense, or depressed. Your relationships may suffer, and you may not do well at work or school.  What can you do to manage stress?  You can try these things to help manage stress:   Do something active. Exercise or activity can help reduce stress. Walking is a great way to get started. Even everyday activities such as housecleaning or yard work can help.  Try yoga or sujata chi. These techniques combine exercise and meditation. You may need some training at first to learn them.  Do something you enjoy. For example, listen to music or go to a movie. Practice your hobby or do volunteer work.  Meditate. This can help you relax, because you are not worrying about what happened before or what may happen in the future.  Do guided imagery. Imagine yourself in any setting that helps you feel calm. You can use online videos, books, or a teacher to guide you.  Do breathing exercises. For example:  From a standing position, bend forward from the waist with your knees slightly bent. Let your arms dangle close to the floor.  Breathe in slowly and deeply as you return to a standing position. Roll up slowly and lift your head last.  Hold your breath for just a few seconds in the standing position.  Breathe out slowly and bend forward from the waist.  Let your feelings out. Talk, laugh, cry, and express anger when you need to. Talking with supportive friends or family, a counselor, or a janie leader about your feelings is a healthy way to relieve stress. Avoid discussing your  "feelings with people who make you feel worse.  Write. It may help to write about things that are bothering you. This helps you find out how much stress you feel and what is causing it. When you know this, you can find better ways to cope.  What can you do to prevent stress?  You might try some of these things to help prevent stress:  Manage your time. This helps you find time to do the things you want and need to do.  Get enough sleep. Your body recovers from the stresses of the day while you are sleeping.  Get support. Your family, friends, and community can make a difference in how you experience stress.  Limit your news feed. Avoid or limit time on social media or news that may make you feel stressed.  Do something active. Exercise or activity can help reduce stress. Walking is a great way to get started.  Where can you learn more?  Go to https://www.BrainBot."[employer]/patiented  Enter N032 in the search box to learn more about \"Learning About Stress.\"  Current as of: October 24, 2024  Content Version: 14.5    3821-7122 [employer].   Care instructions adapted under license by your healthcare professional. If you have questions about a medical condition or this instruction, always ask your healthcare professional. [employer] disclaims any warranty or liability for your use of this information.    Learning About Sleeping Well  What does sleeping well mean?     Sleeping well means getting enough sleep to feel good and stay healthy. How much sleep is enough varies among people.  The number of hours you sleep and how you feel when you wake up are both important. If you do not feel refreshed, you probably need more sleep. Another sign of not getting enough sleep is feeling tired during the day.  Experts recommend that adults get at least 7 or more hours of sleep per day. Children and older adults need more sleep.  Why is getting enough sleep important?  Getting enough quality sleep is a basic part " "of good health. When your sleep suffers, your physical health, mood, and your thoughts can suffer too. You may find yourself feeling more grumpy or stressed. Not getting enough sleep also can lead to serious problems, including injury, accidents, anxiety, and depression.  What might cause poor sleeping?  Many things can cause sleep problems, including:  Changes to your sleep schedule.  Stress. Stress can be caused by fear about a single event, such as giving a speech. Or you may have ongoing stress, such as worry about work or school.  Depression, anxiety, and other mental or emotional conditions.  Changes in your sleep habits or surroundings. This includes changes that happen where you sleep, such as noise, light, or sleeping in a different bed. It also includes changes in your sleep pattern, such as having jet lag or working a late shift.  Health problems, such as pain, breathing problems, and restless legs syndrome.  Lack of regular exercise.  Using alcohol, nicotine, or caffeine before bed.  How can you help yourself?  Here are some tips that may help you sleep more soundly and wake up feeling more refreshed.  Your sleeping area   Use your bedroom only for sleeping and sex. A bit of light reading may help you fall asleep. But if it doesn't, do your reading elsewhere in the house. Try not to use your TV, computer, smartphone, or tablet while you are in bed.  Be sure your bed is big enough to stretch out comfortably, especially if you have a sleep partner.  Keep your bedroom quiet, dark, and cool. Use curtains, blinds, or a sleep mask to block out light. To block out noise, use earplugs, soothing music, or a \"white noise\" machine.  Your evening and bedtime routine   Create a relaxing bedtime routine. You might want to take a warm shower or bath, or listen to soothing music.  Go to bed at the same time every night. And get up at the same time every morning, even if you feel tired.  What to avoid   Limit caffeine " "(coffee, tea, caffeinated sodas) during the day, and don't have any for at least 6 hours before bedtime.  Avoid drinking alcohol before bedtime. Alcohol can cause you to wake up more often during the night.  Try not to smoke or use tobacco, especially in the evening. Nicotine can keep you awake.  Limit naps during the day, especially close to bedtime.  Avoid lying in bed awake for too long. If you can't fall asleep or if you wake up in the middle of the night and can't get back to sleep within about 20 minutes, get out of bed and go to another room until you feel sleepy.  Avoid taking medicine right before bed that may keep you awake or make you feel hyper or energized. Your doctor can tell you if your medicine may do this and if you can take it earlier in the day.  If you can't sleep   Imagine yourself in a peaceful, pleasant scene. Focus on the details and feelings of being in a place that is relaxing.  Get up and do a quiet or boring activity until you feel sleepy.  Avoid drinking any liquids before going to bed to help prevent waking up often to use the bathroom.  Where can you learn more?  Go to https://www.BookMyShow.net/patiented  Enter J942 in the search box to learn more about \"Learning About Sleeping Well.\"  Current as of: July 31, 2024  Content Version: 14.5 2024-2025 [employer].   Care instructions adapted under license by your healthcare professional. If you have questions about a medical condition or this instruction, always ask your healthcare professional. [employer] disclaims any warranty or liability for your use of this information.    Bladder Training: Care Instructions  Your Care Instructions     Bladder training is used to treat urge incontinence and stress incontinence. Urge incontinence means that the need to urinate comes on so fast that you can't get to a toilet in time. Stress incontinence means that you leak urine because of pressure on your bladder. For " example, it may happen when you laugh, cough, or lift something heavy.  Bladder training can increase how long you can wait before you have to urinate. It can also help your bladder hold more urine. And it can give you better control over the urge to urinate.  It is important to remember that bladder training takes a few weeks to a few months to make a difference. You may not see results right away, but don't give up.  Follow-up care is a key part of your treatment and safety. Be sure to make and go to all appointments, and call your doctor if you are having problems. It's also a good idea to know your test results and keep a list of the medicines you take.  How can you care for yourself at home?  Work with your doctor to come up with a bladder training program that is right for you. You may use one or more of the following methods.  Delayed urination  In the beginning, try to keep from urinating for 5 minutes after you first feel the need to go.  While you wait, take deep, slow breaths to relax. Kegel exercises can also help you delay the need to go to the bathroom.  After some practice, when you can easily wait 5 minutes to urinate, try to wait 10 minutes before you urinate.  Slowly increase the waiting period until you are able to control when you have to urinate.  Scheduled urination  Empty your bladder when you first wake up in the morning.  Schedule times throughout the day when you will urinate.  Start by going to the bathroom every hour, even if you don't need to go.  Slowly increase the time between trips to the bathroom.  When you have found a schedule that works well for you, keep doing it.  If you wake up during the night and have to urinate, do it. Apply your schedule to waking hours only.  Kegel exercises  These tighten and strengthen pelvic muscles, which can help you control the flow of urine. (If doing these exercises causes pain, stop doing them and talk with your doctor.) To do Kegel  "exercises:  Squeeze your muscles as if you were trying not to pass gas. Or squeeze your muscles as if you were stopping the flow of urine. Your belly, legs, and buttocks shouldn't move.  Hold the squeeze for 3 seconds, then relax for 5 to 10 seconds.  Start with 3 seconds, then add 1 second each week until you are able to squeeze for 10 seconds.  Repeat the exercise 10 times a session. Do 3 to 8 sessions a day.  When should you call for help?  Watch closely for changes in your health, and be sure to contact your doctor if:    Your incontinence is getting worse.     You do not get better as expected.   Where can you learn more?  Go to https://www.[employer].net/patiented  Enter V684 in the search box to learn more about \"Bladder Training: Care Instructions.\"  Current as of: April 30, 2024  Content Version: 14.5    7070-1727 [employer].   Care instructions adapted under license by your healthcare professional. If you have questions about a medical condition or this instruction, always ask your healthcare professional. [employer] disclaims any warranty or liability for your use of this information.       "

## 2025-07-31 NOTE — PROGRESS NOTES
Preventive Care Visit  [facility]  Yina Ham MD, Family Medicine  Jul 31, 2025  {Provider  Link to SmartSet :514091}    {PROVIDER CHARTING PREFERENCE:111129}    Tarah Gallegos is a 70 year old, presenting for the following:  Physical (Wants labs: glucose, thyroid, vitamin D/PT for right knee & balance/Can she have stool test or needs colonoscopy)        7/31/2025     2:06 PM   Additional Questions   Roomed by Nimco HAMM & Ting PABON   Accompanied by self          HPI       {SUPERLIST (Optional):491240}  {additonal problems for provider to add (Optional):439592}  Advance Care Planning    Document on file is a Health Care Directive or POLST.        7/28/2025   General Health   How would you rate your overall physical health? Good   Feel stress (tense, anxious, or unable to sleep) Very much   (!) STRESS CONCERN      7/28/2025   Nutrition   Diet: Regular (no restrictions)         7/28/2025   Exercise   Days per week of moderate/strenous exercise 1 day   Average minutes spent exercising at this level 10 min   (!) EXERCISE CONCERN  - statioinary bike.        7/28/2025   Social Factors   Frequency of gathering with friends or relatives More than three times a week   Worry food won't last until get money to buy more No   Food not last or not have enough money for food? No   Do you have housing? (Housing is defined as stable permanent housing and does not include staying outside in a car, in a tent, in an abandoned building, in an overnight shelter, or couch-surfing.) Yes   Are you worried about losing your housing? No   Lack of transportation? No   Unable to get utilities (heat,electricity)? No         7/31/2025   Fall Risk   Gait Speed Test (Document in seconds) 3.45   Gait Speed Test Interpretation Less than or equal to 5.00 seconds - PASS          7/28/2025   Activities of Daily Living- Home Safety   Needs help with the following daily activites None of the above   Safety concerns in the  home None of the above         7/28/2025   Dental   Dentist two times every year? Yes         7/28/2025   Hearing Screening   Hearing concerns? (!) I NEED TO ASK PEOPLE TO SPEAK UP OR REPEAT THEMSELVES.    (!) IT'S HARD TO FOLLOW A CONVERSATION IN A NOISY RESTAURANT OR CROWDED ROOM.    (!) TROUBLE UNDERSTANDING SOFT OR WHISPERED SPEECH.   Would you like a referral for hearing testing? (!) YES    Planning    Multiple values from one day are sorted in reverse-chronological order         7/28/2025   Driving Risk Screening   Patient/family members have concerns about driving No         7/28/2025   General Alertness/Fatigue Screening   Have you been more tired than usual lately? (!) YES   Zyprexa with daytime sleepy - AM early      7/28/2025   Urinary Incontinence Screening   Bothered by leaking urine in past 6 months Yes   Better than previous.      Today's PHQ-9 Score:       7/31/2025     1:32 PM   PHQ-9 SCORE   PHQ-9 Total Score MyChart 3 (Minimal depression)   PHQ-9 Total Score 3        Patient-reported         7/28/2025   Substance Use   Alcohol more than 3/day or more than 7/wk No   Do you have a current opioid prescription? No   How severe/bad is pain from 1 to 10? 5/10   Do you use any other substances recreationally? No     Social History     Tobacco Use    Smoking status: Former     Types: Cigarettes    Smokeless tobacco: Never    Tobacco comments:     1/2 cigarette once in a while for headaches   Vaping Use    Vaping status: Never Used   Substance Use Topics    Alcohol use: Yes     Comment: very rare    Drug use: No           1/22/2024   LAST FHS-7 RESULTS   1st degree relative breast or ovarian cancer Yes   Any relative bilateral breast cancer No   Any male have breast cancer No   Any ONE woman have BOTH breast AND ovarian cancer No   Any woman with breast cancer before 50yrs Yes   2 or more relatives with breast AND/OR ovarian cancer Yes   2 or more relatives with breast AND/OR bowel cancer No         Mammogram Screening - Mammogram every 1-2 years updated in Health Maintenance based on mutual decision making      History of abnormal Pap smear: No - age 65 or older with adequate negative prior screening test results (3 consecutive negative cytology results, 2 consecutive negative cotesting results, or 2 consecutive negative HrHPV test results within 10 years, with the most recent test occurring within the recommended screening interval for the test used)        Latest Ref Rng & Units 6/13/2017    10:47 AM 6/13/2017    10:34 AM 2/3/2012     2:46 PM   PAP / HPV   PAP (Historical)   NIL  NIL    HPV 16 DNA NEG Negative      HPV 18 DNA NEG Negative      Other HR HPV NEG Negative        ASCVD Risk   The 10-year ASCVD risk score (Michelle VYAS, et al., 2019) is: 7.1%    Values used to calculate the score:      Age: 70 years      Sex: Female      Is Non- : No      Diabetic: No      Tobacco smoker: No      Systolic Blood Pressure: 119 mmHg      Is BP treated: No      HDL Cholesterol: 56 mg/dL      Total Cholesterol: 235 mg/dL    {Link to Fracture Risk Assessment Tool (Optional):477398}    {Provider  REQUIRED FOR AWV Use the storyboard to review patient history, after sections have been marked as reviewed, refresh note to capture documentation:115194}  {Provider   REQUIRED AWV use this link to review and update sexual activity history  after section has been marked as reviewed, refresh note to capture documentation:348922}  Reviewed and updated as needed this visit by Provider                    Past Medical History:   Diagnosis Date    Absence of menstruation     Bipolar I disorder, most recent episode (or current) manic, moderate (H)     Dermatophytosis of nail     FRACTURE RIB NOS-CLOSED 5/7/2008    May 2008    Major depressive disorder, recurrent episode, moderate (H)     Migraine, unspecified, without mention of intractable migraine without mention of status migrainosus     Motion  sickness     NONSPECIFIC MEDICAL HISTORY     Articulatory Defect due to conductive hearing loss    Obese     Other malaise and fatigue     Pain in joint, lower leg     Plantar fascial fibromatosis     PLANTAR FIBROMATOSIS          Past Surgical History:   Procedure Laterality Date    HC KNEE SCOPE,ABRASN ARTHROPLASTY  10/05    LIGATN/STRIP LONG OR SHORT SAPHEN       OB History    Para Term  AB Living   6 6 6 0 0 6   SAB IAB Ectopic Multiple Live Births   0 0 0 0 0      # Outcome Date GA Lbr Sanjay/2nd Weight Sex Type Anes PTL Lv   6 Term            5 Term            4 Term            3 Term            2 Term            1 Term              BP Readings from Last 3 Encounters:   25 119/78   23 110/74   23 127/85    Wt Readings from Last 3 Encounters:   25 77.6 kg (171 lb 1 oz)   23 86.7 kg (191 lb 3.2 oz)   23 90.9 kg (200 lb 8 oz)                  Current providers sharing in care for this patient include:  Patient Care Team:  Yina Ham MD as PCP - General (Family Medicine)  Yina Ham MD as Assigned PCP    The following health maintenance items are reviewed in Epic and correct as of today:  Health Maintenance   Topic Date Due    ZOSTER VACCINE (2 of 2) 2019    MEDICARE ANNUAL WELLNESS VISIT  2024    ANNUAL REVIEW OF HM ORDERS  2024    COLORECTAL CANCER SCREENING  02/15/2024    COVID-19 VACCINE ( season) 2025    INFLUENZA VACCINE (1) 2025    MAMMO SCREENING  2026    FALL RISK ASSESSMENT  2026    DIABETES SCREENING  2027    DTAP/TDAP/TD VACCINE (3 - Td or Tdap) 2027    ADVANCE CARE PLANNING  2028    LIPID  2029    RSV VACCINE (1 - 1-dose 75+ series) 2032    DEXA  2038    HEPATITIS C SCREENING  Completed    MIGRAINE ACTION PLAN  Completed    PNEUMOCOCCAL VACCINE 50+ YEARS  Completed    HPV VACCINE (No Doses Required) Completed    MENINGITIS VACCINE  Aged Out    PAP   "Discontinued    LUNG CANCER SCREENING  Discontinued       {ROS Picklists (Optional):883601}     Objective    Exam  /78   Pulse 77   Temp 99.1  F (37.3  C) (Temporal)   Resp 16   Ht 1.607 m (5' 3.25\")   Wt 77.6 kg (171 lb 1 oz)   LMP 12/07/2007   SpO2 97%   BMI 30.06 kg/m     Estimated body mass index is 30.06 kg/m  as calculated from the following:    Height as of this encounter: 1.607 m (5' 3.25\").    Weight as of this encounter: 77.6 kg (171 lb 1 oz).    Physical Exam  {Exam Choices (Optional):399386}         7/31/2025   Mini Cog   Clock Draw Score 2 Normal   3 Item Recall 3 objects recalled   Mini Cog Total Score 5     {A Mini-Cog total score of 0-2 suggests the possibility of dementia, score of 3-5 suggests no dementia:956218}        Signed Electronically by: Yina Ham MD  {Email feedback regarding this note to [email or web address]   :[id number]}  "

## 2025-08-02 LAB — LAMOTRIGINE SERPL-MCNC: 3.9 UG/ML

## 2025-08-04 ENCOUNTER — PATIENT OUTREACH (OUTPATIENT)
Dept: CARE COORDINATION | Facility: CLINIC | Age: 70
End: 2025-08-04
Payer: COMMERCIAL

## 2025-08-19 ENCOUNTER — THERAPY VISIT (OUTPATIENT)
Dept: PHYSICAL THERAPY | Facility: CLINIC | Age: 70
End: 2025-08-19
Attending: FAMILY MEDICINE
Payer: COMMERCIAL

## 2025-08-19 DIAGNOSIS — M25.561 ACUTE PAIN OF RIGHT KNEE: ICD-10-CM

## 2025-08-19 PROCEDURE — 97161 PT EVAL LOW COMPLEX 20 MIN: CPT | Mod: GP | Performed by: PHYSICAL THERAPIST

## 2025-08-19 PROCEDURE — 97110 THERAPEUTIC EXERCISES: CPT | Mod: GP | Performed by: PHYSICAL THERAPIST

## 2025-08-19 ASSESSMENT — ACTIVITIES OF DAILY LIVING (ADL)
SQUAT: ACTIVITY IS VERY DIFFICULT
STAND: ACTIVITY IS MINIMALLY DIFFICULT
RISE FROM A CHAIR: ACTIVITY IS FAIRLY DIFFICULT
HOW_WOULD_YOU_RATE_THE_OVERALL_FUNCTION_OF_YOUR_KNEE_DURING_YOUR_USUAL_DAILY_ACTIVITIES?: ABNORMAL
WALK: ACTIVITY IS FAIRLY DIFFICULT
GO UP STAIRS: ACTIVITY IS VERY DIFFICULT
STIFFNESS: THE SYMPTOM AFFECTS MY ACTIVITY MODERATELY
KNEEL ON THE FRONT OF YOUR KNEE: ACTIVITY IS FAIRLY DIFFICULT
RAW_SCORE: 29.08
LIMPING: THE SYMPTOM AFFECTS MY ACTIVITY MODERATELY
STIFFNESS: THE SYMPTOM AFFECTS MY ACTIVITY MODERATELY
PAIN: THE SYMPTOM AFFECTS MY ACTIVITY MODERATELY
GIVING WAY, BUCKLING OR SHIFTING OF KNEE: THE SYMPTOM AFFECTS MY ACTIVITY MODERATELY
GIVING WAY, BUCKLING OR SHIFTING OF KNEE: THE SYMPTOM AFFECTS MY ACTIVITY MODERATELY
WEAKNESS: THE SYMPTOM AFFECTS MY ACTIVITY MODERATELY
AS_A_RESULT_OF_YOUR_KNEE_INJURY,_HOW_WOULD_YOU_RATE_YOUR_CURRENT_LEVEL_OF_DAILY_ACTIVITY?: ABNORMAL
WALK: ACTIVITY IS FAIRLY DIFFICULT
SIT WITH YOUR KNEE BENT: ACTIVITY IS MINIMALLY DIFFICULT
SIT WITH YOUR KNEE BENT: ACTIVITY IS MINIMALLY DIFFICULT
SQUAT: ACTIVITY IS VERY DIFFICULT
RISE FROM A CHAIR: ACTIVITY IS FAIRLY DIFFICULT
WEAKNESS: THE SYMPTOM AFFECTS MY ACTIVITY MODERATELY
KNEE_ACTIVITY_OF_DAILY_LIVING_SUM: 27
LIMPING: THE SYMPTOM AFFECTS MY ACTIVITY MODERATELY
PLEASE_INDICATE_YOR_PRIMARY_REASON_FOR_REFERRAL_TO_THERAPY:: KNEE
GO DOWN STAIRS: ACTIVITY IS VERY DIFFICULT
HOW_WOULD_YOU_RATE_THE_CURRENT_FUNCTION_OF_YOUR_KNEE_DURING_YOUR_USUAL_DAILY_ACTIVITIES_ON_A_SCALE_FROM_0_TO_100_WITH_100_BEING_YOUR_LEVEL_OF_KNEE_FUNCTION_PRIOR_TO_YOUR_INJURY_AND_0_BEING_THE_INABILITY_TO_PERFORM_ANY_OF_YOUR_USUAL_DAILY_ACTIVITIES?: 50
AS_A_RESULT_OF_YOUR_KNEE_INJURY,_HOW_WOULD_YOU_RATE_YOUR_CURRENT_LEVEL_OF_DAILY_ACTIVITY?: ABNORMAL
PAIN: THE SYMPTOM AFFECTS MY ACTIVITY MODERATELY
HOW_WOULD_YOU_RATE_THE_OVERALL_FUNCTION_OF_YOUR_KNEE_DURING_YOUR_USUAL_DAILY_ACTIVITIES?: ABNORMAL
KNEE_ACTIVITY_OF_DAILY_LIVING_SCORE: 41.54
HOW_WOULD_YOU_RATE_THE_CURRENT_FUNCTION_OF_YOUR_KNEE_DURING_YOUR_USUAL_DAILY_ACTIVITIES_ON_A_SCALE_FROM_0_TO_100_WITH_100_BEING_YOUR_LEVEL_OF_KNEE_FUNCTION_PRIOR_TO_YOUR_INJURY_AND_0_BEING_THE_INABILITY_TO_PERFORM_ANY_OF_YOUR_USUAL_DAILY_ACTIVITIES?: 50
KNEEL ON THE FRONT OF YOUR KNEE: ACTIVITY IS FAIRLY DIFFICULT
GO DOWN STAIRS: ACTIVITY IS VERY DIFFICULT
GO UP STAIRS: ACTIVITY IS VERY DIFFICULT
STAND: ACTIVITY IS MINIMALLY DIFFICULT

## 2025-08-26 ENCOUNTER — THERAPY VISIT (OUTPATIENT)
Dept: PHYSICAL THERAPY | Facility: CLINIC | Age: 70
End: 2025-08-26
Payer: COMMERCIAL

## 2025-08-26 DIAGNOSIS — M25.561 ACUTE PAIN OF RIGHT KNEE: Primary | ICD-10-CM

## 2025-08-26 PROCEDURE — 97140 MANUAL THERAPY 1/> REGIONS: CPT | Mod: GP | Performed by: PHYSICAL THERAPIST

## 2025-08-26 PROCEDURE — 97110 THERAPEUTIC EXERCISES: CPT | Mod: GP | Performed by: PHYSICAL THERAPIST

## (undated) DEVICE — ESU PENCIL W/HOLSTER E2350H

## (undated) DEVICE — VERTEBROPLASTY CEMENT W/BONE MIXER C01B: Type: IMPLANTABLE DEVICE | Site: SPINE THORACIC | Status: NON-FUNCTIONAL

## (undated) DEVICE — CUP AND LID 2PK 2OZ STERILE  SSK9006A

## (undated) DEVICE — BLADE KNIFE SURG 15 371115

## (undated) DEVICE — SUCTION MINISQUAIR SMOKE EVAC CAPTURE DEVICE SQ20012-01

## (undated) DEVICE — ESU ELEC BLADE 6" COATED/INSULATED E1455-6

## (undated) DEVICE — PACK SPINE SM CUSTOM SNE15SSFSK

## (undated) DEVICE — GLOVE PROTEXIS W/NEU-THERA 8.5  2D73TE85

## (undated) DEVICE — POSITIONER PT PRONESAFE HEAD REST W/DERMAPROX INSERT 40599

## (undated) DEVICE — TAPE MEDIPORE 6"X10YD 2966

## (undated) DEVICE — TOOL DISSECT MIDAS MR8 12CM TELESC MATCH 2.5 MR8-T12MH25

## (undated) DEVICE — DRSG GAUZE 4X4" 3033

## (undated) DEVICE — SUCTION MANIFOLD NEPTUNE SGL

## (undated) DEVICE — SOL WATER IRRIG 1000ML BOTTLE 2F7114

## (undated) DEVICE — SU VICRYL 3-0 SH CR 8X18" J774

## (undated) DEVICE — DRAPE SHEET REV FOLD 3/4 9349

## (undated) DEVICE — DRAPE COVER C-ARM SEAMLESS SNAP-KAP 03-KP26 LATEX FREE

## (undated) DEVICE — GOWN REINFORCED XXLG 9071

## (undated) DEVICE — LINEN TOWEL PACK X5 5464

## (undated) DEVICE — Device

## (undated) DEVICE — NDL SPINAL 18GA 3.5" 405184

## (undated) DEVICE — ESU GROUND PAD UNIVERSAL W/O CORD

## (undated) DEVICE — BONE CEMENT KYPHX HV-R C01A: Type: IMPLANTABLE DEVICE | Site: SPINE THORACIC | Status: NON-FUNCTIONAL

## (undated) DEVICE — PREP DURAPREP 26ML APL 8630

## (undated) RX ORDER — PROPOFOL 10 MG/ML
INJECTION, EMULSION INTRAVENOUS
Status: DISPENSED
Start: 2021-11-18

## (undated) RX ORDER — FENTANYL CITRATE 50 UG/ML
INJECTION, SOLUTION INTRAMUSCULAR; INTRAVENOUS
Status: DISPENSED
Start: 2021-11-18

## (undated) RX ORDER — CEFAZOLIN SODIUM 2 G/100ML
INJECTION, SOLUTION INTRAVENOUS
Status: DISPENSED
Start: 2021-11-18

## (undated) RX ORDER — LIDOCAINE HYDROCHLORIDE 20 MG/ML
INJECTION, SOLUTION EPIDURAL; INFILTRATION; INTRACAUDAL; PERINEURAL
Status: DISPENSED
Start: 2021-11-18